# Patient Record
Sex: MALE | Race: BLACK OR AFRICAN AMERICAN | NOT HISPANIC OR LATINO | Employment: OTHER | URBAN - METROPOLITAN AREA
[De-identification: names, ages, dates, MRNs, and addresses within clinical notes are randomized per-mention and may not be internally consistent; named-entity substitution may affect disease eponyms.]

---

## 2022-04-07 ENCOUNTER — HISTORICAL (OUTPATIENT)
Dept: ADMINISTRATIVE | Facility: HOSPITAL | Age: 27
End: 2022-04-07

## 2022-04-18 ENCOUNTER — HISTORICAL (OUTPATIENT)
Dept: ADMINISTRATIVE | Facility: HOSPITAL | Age: 27
End: 2022-04-18
Payer: MEDICAID

## 2022-04-24 VITALS
HEIGHT: 67 IN | BODY MASS INDEX: 23.53 KG/M2 | SYSTOLIC BLOOD PRESSURE: 130 MMHG | OXYGEN SATURATION: 100 % | DIASTOLIC BLOOD PRESSURE: 86 MMHG | WEIGHT: 149.94 LBS

## 2022-05-08 ENCOUNTER — HOSPITAL ENCOUNTER (EMERGENCY)
Facility: HOSPITAL | Age: 27
Discharge: HOME | End: 2022-05-08
Attending: INTERNAL MEDICINE
Payer: MEDICAID

## 2022-05-08 VITALS
HEIGHT: 67 IN | HEART RATE: 81 BPM | DIASTOLIC BLOOD PRESSURE: 86 MMHG | SYSTOLIC BLOOD PRESSURE: 130 MMHG | OXYGEN SATURATION: 98 % | TEMPERATURE: 98 F | BODY MASS INDEX: 21.19 KG/M2 | WEIGHT: 135 LBS | RESPIRATION RATE: 18 BRPM

## 2022-05-08 DIAGNOSIS — R36.9 PENILE DISCHARGE: Primary | ICD-10-CM

## 2022-05-08 DIAGNOSIS — Z71.1 CONCERN ABOUT STD IN MALE WITHOUT DIAGNOSIS: ICD-10-CM

## 2022-05-08 LAB
APPEARANCE UR: ABNORMAL
BACTERIA #/AREA URNS AUTO: ABNORMAL /HPF
BILIRUB UR QL STRIP.AUTO: NEGATIVE MG/DL
COLOR UR AUTO: ABNORMAL
GLUCOSE UR QL STRIP.AUTO: NEGATIVE MG/DL
KETONES UR QL STRIP.AUTO: NEGATIVE MG/DL
LEUKOCYTE ESTERASE UR QL STRIP.AUTO: ABNORMAL UNIT/L
NITRITE UR QL STRIP.AUTO: NEGATIVE
PH UR STRIP.AUTO: 6 [PH]
PROT UR QL STRIP.AUTO: NEGATIVE MG/DL
RBC #/AREA URNS AUTO: ABNORMAL /HPF
RBC UR QL AUTO: NEGATIVE UNIT/L
SP GR UR STRIP.AUTO: 1.02
SQUAMOUS #/AREA URNS AUTO: ABNORMAL /LPF
UROBILINOGEN UR STRIP-ACNC: 0.2 MG/DL
WBC #/AREA URNS AUTO: ABNORMAL /HPF

## 2022-05-08 PROCEDURE — 87591 N.GONORRHOEAE DNA AMP PROB: CPT | Performed by: NURSE PRACTITIONER

## 2022-05-08 PROCEDURE — 81003 URINALYSIS AUTO W/O SCOPE: CPT | Performed by: NURSE PRACTITIONER

## 2022-05-08 PROCEDURE — 87088 URINE BACTERIA CULTURE: CPT | Performed by: NURSE PRACTITIONER

## 2022-05-08 PROCEDURE — 63600175 PHARM REV CODE 636 W HCPCS: Performed by: NURSE PRACTITIONER

## 2022-05-08 PROCEDURE — 87491 CHLMYD TRACH DNA AMP PROBE: CPT | Performed by: NURSE PRACTITIONER

## 2022-05-08 PROCEDURE — 96372 THER/PROPH/DIAG INJ SC/IM: CPT | Performed by: NURSE PRACTITIONER

## 2022-05-08 PROCEDURE — 81015 MICROSCOPIC EXAM OF URINE: CPT | Performed by: NURSE PRACTITIONER

## 2022-05-08 PROCEDURE — 99284 EMERGENCY DEPT VISIT MOD MDM: CPT

## 2022-05-08 RX ORDER — CEFTRIAXONE 1 G/1
0.5 INJECTION, POWDER, FOR SOLUTION INTRAMUSCULAR; INTRAVENOUS
Status: COMPLETED | OUTPATIENT
Start: 2022-05-08 | End: 2022-05-08

## 2022-05-08 RX ORDER — DOXYCYCLINE 100 MG/1
100 CAPSULE ORAL EVERY 12 HOURS
Qty: 20 CAPSULE | Refills: 0 | OUTPATIENT
Start: 2022-05-08 | End: 2023-07-25

## 2022-05-08 RX ADMIN — CEFTRIAXONE 0.5 G: 1 INJECTION, POWDER, FOR SOLUTION INTRAMUSCULAR; INTRAVENOUS at 09:05

## 2022-05-08 NOTE — Clinical Note
"Jun Vasquez"Reji was seen and treated in our emergency department on 5/8/2022.  He may return to work on 05/09/2022.       If you have any questions or concerns, please don't hesitate to call.      MONY Stafford"

## 2022-05-09 LAB
C TRACH DNA SPEC QL NAA+PROBE: NOT DETECTED
N GONORRHOEA DNA SPEC QL NAA+PROBE: DETECTED

## 2022-05-09 NOTE — ED PROVIDER NOTES
Encounter Date: 5/8/2022       History     Chief Complaint   Patient presents with    Penile Discharge     Patient states penile discharge and dysuria x 2-3 days. Denies any fever, abdominal pain, testicular pain or swelling, or any rashes or lesions. States unprotected intercourse.         Review of patient's allergies indicates:   Allergen Reactions    Penicillins      No past medical history on file.  No past surgical history on file.  No family history on file.     Review of Systems   Constitutional: Negative.  Negative for chills and fever.   HENT: Negative.    Eyes: Negative.    Respiratory: Negative.    Cardiovascular: Negative.    Gastrointestinal: Negative.  Negative for abdominal pain.   Endocrine: Negative.    Genitourinary: Positive for dysuria and penile discharge. Negative for testicular pain.   Musculoskeletal: Negative.    Skin: Negative.    Allergic/Immunologic: Negative.    Neurological: Negative.    Hematological: Negative.    Psychiatric/Behavioral: Negative.    All other systems reviewed and are negative.      Physical Exam     Initial Vitals [05/08/22 2006]   BP Pulse Resp Temp SpO2   130/86 81 18 98.3 °F (36.8 °C) 98 %      MAP       --         Physical Exam    Nursing note and vitals reviewed.  Constitutional: He appears well-developed and well-nourished. No distress.   HENT:   Head: Normocephalic and atraumatic.   Mouth/Throat: Oropharynx is clear and moist.   Eyes: Conjunctivae and EOM are normal. Pupils are equal, round, and reactive to light.   Neck: Neck supple.   Normal range of motion.  Cardiovascular: Normal rate, regular rhythm and normal heart sounds. Exam reveals no gallop and no friction rub.    No murmur heard.  Pulmonary/Chest: Breath sounds normal. No respiratory distress. He has no wheezes. He has no rhonchi. He has no rales.   Abdominal: Abdomen is soft. Bowel sounds are normal. He exhibits no distension and no mass. There is no abdominal tenderness. There is no rebound and  no guarding.   Musculoskeletal:         General: No edema. Normal range of motion.      Cervical back: Normal range of motion and neck supple.     Neurological: He is alert and oriented to person, place, and time. He has normal strength.   Skin: Skin is warm and dry. No rash noted.   Psychiatric: He has a normal mood and affect. Thought content normal.         ED Course   Procedures  Labs Reviewed   CHLAMYDIA/GONORRHOEAE(GC), PCR   URINALYSIS          Imaging Results    None          Medications   cefTRIAXone injection 0.5 g (has no administration in time range)     Medical Decision Making:   Initial Assessment:   Patient is awake, alert, and nontoxic appearing. Patient states penile discharge and dysuria.   Differential Diagnosis:   STD, Gonorrhea, Chlamydia   Clinical Tests:   Lab Tests: Ordered and Reviewed                      Clinical Impression:   Final diagnoses:  [R36.9] Penile discharge (Primary)  [Z71.1] Concern about STD in male without diagnosis          ED Disposition Condition    Discharge Stable        ED Prescriptions     Medication Sig Dispense Start Date End Date Auth. Provider    doxycycline (VIBRAMYCIN) 100 MG Cap Take 1 capsule (100 mg total) by mouth every 12 (twelve) hours. for 10 days 20 capsule 5/8/2022 5/18/2022 MONY Stafford        Follow-up Information     Follow up With Specialties Details Why Contact Info    Primary Care Provider  In 2 days             MONY Stafford  05/08/22 8488

## 2022-05-11 LAB — BACTERIA UR CULT: NO GROWTH

## 2022-09-11 ENCOUNTER — HOSPITAL ENCOUNTER (EMERGENCY)
Facility: HOSPITAL | Age: 27
Discharge: HOME | End: 2022-09-12
Attending: STUDENT IN AN ORGANIZED HEALTH CARE EDUCATION/TRAINING PROGRAM
Payer: MEDICAID

## 2022-09-11 VITALS
RESPIRATION RATE: 20 BRPM | OXYGEN SATURATION: 98 % | TEMPERATURE: 99 F | SYSTOLIC BLOOD PRESSURE: 122 MMHG | HEART RATE: 85 BPM | DIASTOLIC BLOOD PRESSURE: 62 MMHG | WEIGHT: 142 LBS | BODY MASS INDEX: 22.29 KG/M2 | HEIGHT: 67 IN

## 2022-09-11 DIAGNOSIS — K62.5 RECTAL BLEEDING: ICD-10-CM

## 2022-09-11 DIAGNOSIS — R10.9 RIGHT FLANK PAIN: ICD-10-CM

## 2022-09-11 DIAGNOSIS — L84 CALLUS OF FOOT: Primary | ICD-10-CM

## 2022-09-11 LAB
APPEARANCE UR: CLEAR
APTT PPP: 26 SECONDS (ref 23.4–33.9)
BASOPHILS # BLD AUTO: 0.03 X10(3)/MCL (ref 0–0.2)
BASOPHILS NFR BLD AUTO: 0.5 %
BILIRUB UR QL STRIP.AUTO: NEGATIVE MG/DL
COLOR UR AUTO: YELLOW
EOSINOPHIL # BLD AUTO: 0.13 X10(3)/MCL (ref 0–0.9)
EOSINOPHIL NFR BLD AUTO: 2.2 %
ERYTHROCYTE [DISTWIDTH] IN BLOOD BY AUTOMATED COUNT: 11.9 % (ref 11.5–17)
GLUCOSE UR QL STRIP.AUTO: NEGATIVE MG/DL
HCT VFR BLD AUTO: 46 % (ref 42–52)
HGB BLD-MCNC: 15 GM/DL (ref 14–18)
IMM GRANULOCYTES # BLD AUTO: 0.01 X10(3)/MCL (ref 0–0.04)
IMM GRANULOCYTES NFR BLD AUTO: 0.2 %
INR BLD: 1.17 (ref 2–3)
KETONES UR QL STRIP.AUTO: ABNORMAL MG/DL
LEUKOCYTE ESTERASE UR QL STRIP.AUTO: NEGATIVE UNIT/L
LYMPHOCYTES # BLD AUTO: 2.31 X10(3)/MCL (ref 0.6–4.6)
LYMPHOCYTES NFR BLD AUTO: 39.4 %
MCH RBC QN AUTO: 28.7 PG (ref 27–31)
MCHC RBC AUTO-ENTMCNC: 32.6 MG/DL (ref 33–36)
MCV RBC AUTO: 88.1 FL (ref 80–94)
MONOCYTES # BLD AUTO: 0.53 X10(3)/MCL (ref 0.1–1.3)
MONOCYTES NFR BLD AUTO: 9 %
NEUTROPHILS # BLD AUTO: 2.9 X10(3)/MCL (ref 2.1–9.2)
NEUTROPHILS NFR BLD AUTO: 48.7 %
NITRITE UR QL STRIP.AUTO: NEGATIVE
NRBC BLD AUTO-RTO: 0 %
PH UR STRIP.AUTO: 6 [PH]
PLATELET # BLD AUTO: 314 X10(3)/MCL (ref 130–400)
PMV BLD AUTO: 9.2 FL (ref 7.4–10.4)
PROT UR QL STRIP.AUTO: NEGATIVE MG/DL
PROTHROMBIN TIME: 14.7 SECONDS (ref 11.7–14.5)
RBC # BLD AUTO: 5.22 X10(6)/MCL (ref 4.7–6.1)
RBC UR QL AUTO: NEGATIVE UNIT/L
SP GR UR STRIP.AUTO: >=1.03
UROBILINOGEN UR STRIP-ACNC: 0.2 MG/DL
WBC # SPEC AUTO: 5.9 X10(3)/MCL (ref 4.5–11.5)

## 2022-09-11 PROCEDURE — 85610 PROTHROMBIN TIME: CPT | Performed by: STUDENT IN AN ORGANIZED HEALTH CARE EDUCATION/TRAINING PROGRAM

## 2022-09-11 PROCEDURE — 99283 EMERGENCY DEPT VISIT LOW MDM: CPT

## 2022-09-11 PROCEDURE — 81003 URINALYSIS AUTO W/O SCOPE: CPT | Performed by: STUDENT IN AN ORGANIZED HEALTH CARE EDUCATION/TRAINING PROGRAM

## 2022-09-11 PROCEDURE — 85730 THROMBOPLASTIN TIME PARTIAL: CPT | Performed by: STUDENT IN AN ORGANIZED HEALTH CARE EDUCATION/TRAINING PROGRAM

## 2022-09-11 PROCEDURE — 80053 COMPREHEN METABOLIC PANEL: CPT | Performed by: STUDENT IN AN ORGANIZED HEALTH CARE EDUCATION/TRAINING PROGRAM

## 2022-09-11 PROCEDURE — 85025 COMPLETE CBC W/AUTO DIFF WBC: CPT | Performed by: STUDENT IN AN ORGANIZED HEALTH CARE EDUCATION/TRAINING PROGRAM

## 2022-09-11 PROCEDURE — 36415 COLL VENOUS BLD VENIPUNCTURE: CPT | Performed by: STUDENT IN AN ORGANIZED HEALTH CARE EDUCATION/TRAINING PROGRAM

## 2022-09-12 LAB
ALBUMIN SERPL-MCNC: 4.5 GM/DL (ref 3.5–5)
ALBUMIN/GLOB SERPL: 1.3 RATIO (ref 1.1–2)
ALP SERPL-CCNC: 52 UNIT/L (ref 40–150)
ALT SERPL-CCNC: 12 UNIT/L (ref 0–55)
AST SERPL-CCNC: 17 UNIT/L (ref 5–34)
BILIRUBIN DIRECT+TOT PNL SERPL-MCNC: 0.4 MG/DL
BUN SERPL-MCNC: 16.7 MG/DL (ref 8.9–20.6)
CALCIUM SERPL-MCNC: 9.9 MG/DL (ref 8.4–10.2)
CHLORIDE SERPL-SCNC: 102 MMOL/L (ref 98–107)
CO2 SERPL-SCNC: 29 MMOL/L (ref 22–29)
CREAT SERPL-MCNC: 1.1 MG/DL (ref 0.73–1.18)
GFR SERPLBLD CREATININE-BSD FMLA CKD-EPI: >60 MLS/MIN/1.73/M2
GLOBULIN SER-MCNC: 3.5 GM/DL (ref 2.4–3.5)
GLUCOSE SERPL-MCNC: 88 MG/DL (ref 74–100)
POTASSIUM SERPL-SCNC: 4.1 MMOL/L (ref 3.5–5.1)
PROT SERPL-MCNC: 8 GM/DL (ref 6.4–8.3)
SODIUM SERPL-SCNC: 142 MMOL/L (ref 136–145)

## 2022-09-12 NOTE — ED TRIAGE NOTES
Pt to er with blood stool x 2 months. Right flank pain x months. Pt states that he also has left foot/neck pain

## 2022-09-12 NOTE — ED PROVIDER NOTES
Encounter Date: 9/11/2022       History     Chief Complaint   Patient presents with    Rectal Bleeding    Flank Pain     HPI    26-year-old male presents emergency department with multiple complaints.  First is complaining of some left toe pain.  States he noticed a callus on his toe.  States has been ongoing for last few months.  No new injury.  No worsening of pain.  Unsure why the toe was hurting.  States that his work boots are too big for him.    Secondly patient is complaining of some right-sided flank pain.  States he has been ongoing for last 2-3 months.  States the pain fluctuates in intensity.  States it slightly worsened today so came in for evaluation.  He denies any burning on urination, blood in his urine or decreased urination.    Lastly, patient stating he has some blood mixed into his stool has been ongoing intermittently for last 2 months.  States that the blood is on the outside of the stool and when he wipes there is blood in the toilet paper.  Denies having any abdominal pain other than the flank pain as mentioned above.  No history is of hemorrhoids.  No weight loss.  Denies constipation or hard stools    Review of patient's allergies indicates:   Allergen Reactions    Penicillins      History reviewed. No pertinent past medical history.  History reviewed. No pertinent surgical history.  History reviewed. No pertinent family history.  Social History     Tobacco Use    Smoking status: Never    Smokeless tobacco: Never   Substance Use Topics    Alcohol use: Yes     Comment: socially     Review of Systems   Constitutional:  Negative for fever.   Respiratory:  Negative for cough and shortness of breath.    Cardiovascular:  Positive for chest pain.   Gastrointestinal:  Positive for anal bleeding and blood in stool. Negative for abdominal pain, constipation, diarrhea, nausea, rectal pain and vomiting.   Genitourinary:  Positive for flank pain. Negative for decreased urine volume, difficulty  "urinating, dysuria, hematuria, penile discharge, penile pain, scrotal swelling, testicular pain and urgency.   Musculoskeletal:         Toe pain   Skin:  Negative for rash and wound.     Physical Exam     Initial Vitals [09/11/22 2216]   BP Pulse Resp Temp SpO2   122/62 85 20 98.5 °F (36.9 °C) 98 %      MAP       --         Physical Exam    Nursing note and vitals reviewed.  Constitutional: He appears well-developed and well-nourished. No distress.   Cardiovascular:  Normal rate and regular rhythm.           Pulmonary/Chest: Breath sounds normal.   Abdominal: Abdomen is soft. Bowel sounds are normal. He exhibits no distension. There is abdominal tenderness (generalized tenderness to the right flank which seems to be exaggerated).   There is right CVA tenderness.  No left CVA tenderness. There is no rebound and no guarding.   Genitourinary:    Rectum normal.   Rectum:      No anal fissure or external hemorrhoid.      Genitourinary Comments: Patient refused internal rectal exam.  He understands that this significantly limits my diagnostic abilities.  Patient states that he understands but "it's not that bad anyway".     Musculoskeletal:         General: No tenderness. Normal range of motion.     Neurological: He is alert and oriented to person, place, and time. GCS score is 15. GCS eye subscore is 4. GCS verbal subscore is 5. GCS motor subscore is 6.   Skin: Skin is warm and dry. Capillary refill takes less than 2 seconds.   Callus noted to left great toe.     Psychiatric: He has a normal mood and affect. Thought content normal.       ED Course   Procedures  Labs Reviewed   URINALYSIS, REFLEX TO URINE CULTURE - Abnormal; Notable for the following components:       Result Value    Ketones, UA Trace (*)     All other components within normal limits   PROTIME-INR - Abnormal; Notable for the following components:    PT 14.7 (*)     INR 1.17 (*)     All other components within normal limits   CBC WITH DIFFERENTIAL - " Abnormal; Notable for the following components:    MCHC 32.6 (*)     All other components within normal limits   APTT - Normal   COMPREHENSIVE METABOLIC PANEL   CBC W/ AUTO DIFFERENTIAL    Narrative:     The following orders were created for panel order CBC auto differential.  Procedure                               Abnormality         Status                     ---------                               -----------         ------                     CBC with Differential[[id number]]        Abnormal            Final result                 Please view results for these tests on the individual orders.   OCCULT BLOOD,STOOL,SCREEN (1-3)    Narrative:     The following orders were created for panel order Occult Blood, Stool Screening (1 -3).  Procedure                               Abnormality         Status                     ---------                               -----------         ------                     Occult Blood Stool, CA S...[[id number]]                                                 OCCULT BLOOD STOOL, CA S...[[id number]]                                                   Please view results for these tests on the individual orders.   OCCULT BLOOD STOOL, CA SCREEN   OCCULT BLOOD STOOL, CA SCREEN 2ND SPEC          Imaging Results    None          Medications - No data to display  Medical Decision Making:   Differential Diagnosis:   Toe callus, flank muscle strain, kidney stone, UTI, pyelonephritis, anal fissure, hemorrhoid, lower GI bleed  ED Management:  As stated in physical exam patient does not want to have a rectal exam completed.  He understands that this will limit the ability of complete evaluation.  States that the bleeding is not bad any way and just wants to have the flank in the toe looked at.           ED Course as of 09/12/22 0004   Mon Sep 12, 2022   0002 Patient resting in bed no acute distress.  Vital signs stable.  Labs although normal limits.  No blood in his urine.  States he is no longer  having any flank pain.  No indication for scanning. [BS]      ED Course User Index  [BS] Pasquale Justice MD             Clinical Impression:   Final diagnoses:  [L84] Callus of foot (Primary)  [R10.9] Right flank pain  [K62.5] Rectal bleeding      ED Disposition Condition    Discharge Stable          ED Prescriptions    None       Follow-up Information       Follow up With Specialties Details Why Contact Info    [facility] - Emergency Dept Emergency Medicine   [id number]  [facility] [id number] [phone]             Pasquale Justice MD  09/12/22 0004

## 2022-11-13 ENCOUNTER — HOSPITAL ENCOUNTER (EMERGENCY)
Facility: HOSPITAL | Age: 27
Discharge: HOME | End: 2022-11-13
Attending: INTERNAL MEDICINE
Payer: MEDICAID

## 2022-11-13 VITALS
HEART RATE: 68 BPM | OXYGEN SATURATION: 99 % | BODY MASS INDEX: 22.76 KG/M2 | HEIGHT: 67 IN | WEIGHT: 145 LBS | TEMPERATURE: 98 F | RESPIRATION RATE: 18 BRPM | SYSTOLIC BLOOD PRESSURE: 127 MMHG | DIASTOLIC BLOOD PRESSURE: 93 MMHG

## 2022-11-13 DIAGNOSIS — Z71.1 CONCERN ABOUT STD IN MALE WITHOUT DIAGNOSIS: Primary | ICD-10-CM

## 2022-11-13 LAB
APPEARANCE UR: CLEAR
BILIRUB UR QL STRIP.AUTO: NEGATIVE MG/DL
C TRACH DNA SPEC QL NAA+PROBE: NOT DETECTED
COLOR UR AUTO: YELLOW
GLUCOSE UR QL STRIP.AUTO: NEGATIVE MG/DL
KETONES UR QL STRIP.AUTO: NEGATIVE MG/DL
LEUKOCYTE ESTERASE UR QL STRIP.AUTO: NEGATIVE UNIT/L
N GONORRHOEA DNA SPEC QL NAA+PROBE: NOT DETECTED
NITRITE UR QL STRIP.AUTO: NEGATIVE
PH UR STRIP.AUTO: 6 [PH]
PROT UR QL STRIP.AUTO: NEGATIVE MG/DL
RBC UR QL AUTO: NEGATIVE UNIT/L
SP GR UR STRIP.AUTO: >=1.03
UROBILINOGEN UR STRIP-ACNC: 0.2 MG/DL

## 2022-11-13 PROCEDURE — 87491 CHLMYD TRACH DNA AMP PROBE: CPT | Performed by: PHYSICIAN ASSISTANT

## 2022-11-13 PROCEDURE — 99282 EMERGENCY DEPT VISIT SF MDM: CPT | Mod: 25

## 2022-11-13 PROCEDURE — 81003 URINALYSIS AUTO W/O SCOPE: CPT | Performed by: PHYSICIAN ASSISTANT

## 2022-11-13 PROCEDURE — 87591 N.GONORRHOEAE DNA AMP PROB: CPT | Performed by: PHYSICIAN ASSISTANT

## 2022-11-13 NOTE — ED PROVIDER NOTES
Encounter Date: 11/13/2022       History     Chief Complaint   Patient presents with    Exposure to STD     Pt states wants to be checked, denies any issues with urination or discharge     26-year-old male presents to ED for evaluation of STD check.  States that he is concerned that he may have an STD.  Denies any trouble with urination.  Denies any dysuria.  Denies any penile pain or discharge.  States that he just would like a full check.    The history is provided by the patient. No  was used.   Review of patient's allergies indicates:   Allergen Reactions    Penicillins      History reviewed. No pertinent past medical history.  Past Surgical History:   Procedure Laterality Date    axillary gland       No family history on file.  Social History     Tobacco Use    Smoking status: Never    Smokeless tobacco: Never   Substance Use Topics    Alcohol use: Yes     Comment: socially     Review of Systems   Constitutional:  Negative for chills, fatigue and fever.   HENT:  Negative for sore throat.    Respiratory:  Negative for shortness of breath.    Cardiovascular:  Negative for chest pain.   Gastrointestinal:  Negative for nausea and vomiting.   Genitourinary:  Negative for dysuria, genital sores, hematuria, penile discharge, penile pain, penile swelling, scrotal swelling, testicular pain and urgency.   Musculoskeletal:  Negative for back pain.   Skin:  Negative for rash.   All other systems reviewed and are negative.    Physical Exam     Initial Vitals [11/13/22 1504]   BP Pulse Resp Temp SpO2   (!) 127/93 68 18 98.1 °F (36.7 °C) 99 %      MAP       --         Physical Exam    Nursing note and vitals reviewed.  Constitutional: He appears well-developed. He is cooperative.   HENT:   Head: Normocephalic and atraumatic.   Right Ear: External ear normal.   Left Ear: External ear normal.   Eyes: Conjunctivae are normal. Pupils are equal, round, and reactive to light.   Neck: Neck supple.   Normal range  of motion.  Cardiovascular:  Normal rate, regular rhythm and normal heart sounds.           Pulmonary/Chest: Breath sounds normal. No respiratory distress. He has no wheezes. He has no rhonchi. He has no rales.   Abdominal: Abdomen is soft. Bowel sounds are normal. There is no abdominal tenderness.   Genitourinary:    Genitourinary Comments: Exam deferred by patient     Musculoskeletal:         General: Normal range of motion.      Cervical back: Normal range of motion and neck supple.     Neurological: He is alert and oriented to person, place, and time.   Skin: Skin is warm and dry. Capillary refill takes less than 2 seconds.   Psychiatric: He has a normal mood and affect.       ED Course   Procedures  Labs Reviewed   CHLAMYDIA/GONORRHOEAE(GC), PCR   URINALYSIS, REFLEX TO URINE CULTURE          Imaging Results    None          Medications - No data to display  Medical Decision Making:   Differential Diagnosis:   Sexually transmitted disease, UTI                        Clinical Impression:   Final diagnoses:  [Z71.1] Concern about STD in male without diagnosis (Primary)      ED Disposition Condition    Discharge Stable          ED Prescriptions    None       Follow-up Information       Follow up With Specialties Details Why Contact Info    PCP  In 1 week As needed     [facility]  Go to   [facility] [place] [address]  Phone #[phone]             ESTEFANÍA Naranjo  11/13/22 3117

## 2022-11-17 ENCOUNTER — HOSPITAL ENCOUNTER (EMERGENCY)
Facility: HOSPITAL | Age: 27
Discharge: HOME | End: 2022-11-17
Attending: INTERNAL MEDICINE
Payer: MEDICAID

## 2022-11-17 VITALS
HEART RATE: 68 BPM | RESPIRATION RATE: 16 BRPM | OXYGEN SATURATION: 98 % | TEMPERATURE: 98 F | DIASTOLIC BLOOD PRESSURE: 68 MMHG | SYSTOLIC BLOOD PRESSURE: 122 MMHG

## 2022-11-17 DIAGNOSIS — J06.9 VIRAL URI WITH COUGH: Primary | ICD-10-CM

## 2022-11-17 LAB
FLUAV AG UPPER RESP QL IA.RAPID: NOT DETECTED
FLUBV AG UPPER RESP QL IA.RAPID: NOT DETECTED
SARS-COV-2 RNA RESP QL NAA+PROBE: NOT DETECTED
STREP A PCR (OHS): NOT DETECTED

## 2022-11-17 PROCEDURE — 99284 EMERGENCY DEPT VISIT MOD MDM: CPT | Mod: 25

## 2022-11-17 PROCEDURE — 87651 STREP A DNA AMP PROBE: CPT | Performed by: INTERNAL MEDICINE

## 2022-11-17 PROCEDURE — 0240U COVID/FLU A&B PCR: CPT | Performed by: INTERNAL MEDICINE

## 2022-11-17 RX ORDER — PREDNISONE 20 MG/1
20 TABLET ORAL DAILY
Qty: 5 TABLET | Refills: 0 | Status: SHIPPED | OUTPATIENT
Start: 2022-11-17 | End: 2023-09-10 | Stop reason: ALTCHOICE

## 2022-11-17 RX ORDER — GUAIFENESIN/DEXTROMETHORPHAN 100-10MG/5
5 SYRUP ORAL EVERY 6 HOURS PRN
Qty: 180 ML | Refills: 0 | Status: SHIPPED | OUTPATIENT
Start: 2022-11-17 | End: 2023-09-10 | Stop reason: ALTCHOICE

## 2022-11-17 NOTE — Clinical Note
"Jun Vasquez"Reji was seen and treated in our emergency department on 11/17/2022.  He may return to work on 11/18/2022.       If you have any questions or concerns, please don't hesitate to call.      Micah Jacobsen, DO"

## 2022-11-17 NOTE — Clinical Note
"Jun Vasquez"Reji was seen and treated in our emergency department on 11/17/2022.  He may return to work on 11/19/2022.       If you have any questions or concerns, please don't hesitate to call.      Micah Jacobsen, DO"

## 2022-11-18 NOTE — ED PROVIDER NOTES
Source of History:  Patient, no limitations    Chief complaint:  Chills and Cough      HPI:  Jun Mendoza is a 26 y.o. male presenting with Chills and Cough         Patient presents for evaluation of congestion, sore throat. Onset of symptoms was abrupt starting 1 day ago, and has been unchanged since that time. Symptoms include voice changes. The symptoms are worse with change in weather and cold symptoms. The patient denies complaints of fever. The patient has a past medical history of No pertinent additional PMH. Fluid intake has been good. Care prior to arrival consisted of  None, with no relief.        Review of Systems   Constitutional symptoms:  Negative except as documented in HPI.   Skin symptoms:  Negative except as documented in HPI.   HEENT symptoms:  Negative except as documented in HPI.   Respiratory symptoms:  Negative except as documented in HPI.   Cardiovascular symptoms:  Negative except as documented in HPI.   Gastrointestinal symptoms:  Negative except as documented in HPI.    Genitourinary symptoms:  Negative except as documented in HPI.   Musculoskeletal symptoms:  Negative except as documented in HPI.   Neurologic symptoms:  Negative except as documented in HPI.   Psychiatric symptoms:  Negative except as documented in HPI.   Allergy/immunologic symptoms:  Negative except as documented in HPI.             Additional review of systems information: All other systems reviewed and otherwise negative.      Review of patient's allergies indicates:   Allergen Reactions    Penicillins Other (See Comments)       PMH:  As per HPI and below:    History reviewed. No pertinent past medical history.     History reviewed. No pertinent family history.    Past Surgical History:   Procedure Laterality Date    axillary gland         Social History     Tobacco Use    Smoking status: Never    Smokeless tobacco: Never   Substance Use Topics    Alcohol use: Not Currently     Comment: socially       There is no  problem list on file for this patient.       Physical Exam:    /68 (Patient Position: Sitting)   Pulse 68   Temp 97.7 °F (36.5 °C) (Oral)   Resp 16   SpO2 98%     Nursing note and vital signs reviewed.    General:  Alert, no acute distress.   Skin: Normal for Ethnic Origin, No cyanosis  HEENT: Normocephalic and atraumatic, Vision unchanged, Pupils symmetric, No icterus , Nasal mucosa is pink and moist, congestion  Cardiovascular:  Regular rate and rhythm, No edema  Chest Wall: No deformity, equal chest rise  Respiratory:  Lungs are clear to auscultation, respirations are non-labored.    Musculoskeletal:  No deformity, Normal perfusion to all extremities  Back: No bony tenderness  : No suprapubic pain, No CVA tenderness  Gastrointestinal:  Soft, Nontender, Non distended, Normal bowel sounds.    Neurological:  Alert and oriented to person, place, time, and situation, normal motor observed, normal speech observed.    Psychiatric:  Cooperative, appropriate mood & affect.        Labs that have been ordered have been independently reviewed and interpreted by myself.     Old Chart Reviewed.      Initial Impression/ Differential Dx:  Viral upper respiratory infection, sinusitis, allergic rhinitis, bronchitis, influenza, pneumonia, dental infection, pharyngitis       MDM:      Reviewed Nurses Note.    Reviewed Pertinent old records.    Orders Placed This Encounter    Strep Group A by PCR    COVID/FLU A&B PCR    predniSONE (DELTASONE) 20 MG tablet    dextromethorphan-guaiFENesin  mg/5 ml (ROBITUSSIN-DM)  mg/5 mL liquid                    Labs Reviewed   STREP GROUP A BY PCR - Normal    Narrative:     The Xpert Xpress Strep A test is a rapid, qualitative in vitro diagnostic test for the detection of Streptococcus pyogenes (Group A ß-hemolytic Streptococcus, Strep A) in throat swab specimens from patients with signs and symptoms of pharyngitis.     COVID/FLU A&B PCR - Normal    Narrative:     The Xpert  Xpress SARS-CoV-2/FLU/RSV plus is a rapid, multiplexed real-time PCR test intended for the simultaneous qualitative detection and differentiation of SARS-CoV-2, Influenza A, Influenza B, and respiratory syncytial virus (RSV) viral RNA in either nasopharyngeal swab or nasal swab specimens.                No orders to display        Admission on 11/17/2022, Discharged on 11/17/2022   Component Date Value Ref Range Status    STREP A PCR ([facility]) 11/17/2022 Not Detected  Not Detected Final    Influenza A PCR 11/17/2022 Not Detected  Not Detected Final    Influenza B PCR 11/17/2022 Not Detected  Not Detected Final    SARS-CoV-2 PCR 11/17/2022 Not Detected  Not Detected Final       Imaging Results    None                                              Diagnostic Impression:    1. Viral URI with cough         ED Disposition Condition    Discharge Stable             Follow-up Information       [facility] - Emergency Dept.    Specialty: Emergency Medicine  Why: If symptoms worsen  Contact information:  [address] [facility] [id number] [phone]                            ED Prescriptions       Medication Sig Dispense Start Date End Date Auth. Provider    predniSONE (DELTASONE) 20 MG tablet Take 1 tablet (20 mg total) by mouth once daily. 5 tablet 11/17/2022 -- Micah Jacobsen,     dextromethorphan-guaiFENesin  mg/5 ml (ROBITUSSIN-DM)  mg/5 mL liquid Take 5 mLs by mouth every 6 (six) hours as needed (cough). 180 mL 11/17/2022 -- Micah Jacobsen DO          Follow-up Information       Follow up With Specialties Details Why Contact Info    [facility] - Emergency Dept Emergency Medicine  If symptoms worsen [id number]  [facility] [phone]             Micah Jacobsen DO  11/18/22 0052

## 2022-11-23 ENCOUNTER — PATIENT OUTREACH (OUTPATIENT)
Dept: EMERGENCY MEDICINE | Facility: HOSPITAL | Age: 27
End: 2022-11-23
Payer: MEDICAID

## 2022-11-23 NOTE — PATIENT INSTRUCTIONS
Why Should I Have My Own Doctor or Nurse Practitioner (PCP) to Take Care of Me  What is a PCP (Primary Care Provider)?    A primary care provider is a doctor or nurse practitioner who you can call for an appointment and will see you when you are sick.    You will also be seen at scheduled appointment times during the year to check on your diabetes, or high blood pressure, or heart disease.    Why see the same PCP (doctor/nurse practitioner)?    You can be seen faster when you are sick           You, the PCP (doctor/nurse practitioner) and the office staff get to know each other; you begin to trust them to care for you. You take part in your health choices.   All of you together are a team.    Your medicine is looked at every time you visit, to be sure you are taking the medicine, as the PCP (doctor/nurse practitioner) ordered.    Your PCP (doctor/nurse practitioner) and their staff help keep you healthy and out of the hospital.  They can catch sicknesses earlier by ordering tests once a year to stop or prevent the sickness from getting worse.      Your PCP (doctor/nurse practitioner) can send you to providers who specialize (heart/bone/lung) if you need.  They and their office staff help keep track of your seeing other providers (doctors/nurse practitioners) and tests (CT/ MRIs/ X Rays)) taken.        PCPs want you to stay healthy.  Let us care for you.                   What Do I Do If I Wake Up Sick                                                     If you wake up sick, or you start to fill sick during the day, try these tips to get care and start to feel better soon.                                                                                                                              As soon as you start to feel bad, call your doctor's office and ask for same day or next day visit appointment.        If you cannot be seen with your doctor's office within 24 hours, you can go to an urgent care and be  "seen.          How to stay well:     Take your medicine as ordered by your doctor      Fill your Medicine before you run out      Exercise       Enjoy walking in the sunlight daily  Keep your scheduled clinic appointments      Keep you scheduled yearly wellness visits            Budget-Friendly Healthy Eating    Save money at the grocery store and eat healthy.   Buy groceries keeping your budget in mind  Make a grocery list and only buy what you have on the list  Eat food you cook or have at home; limit fast food or eating out    Compare food labels  Look at store brand food labels and compare to brand names, often food value is the same and the store brand is cheaper      Look for products that do not have sugar, fat, or salt (sodium) added.  These often cost the same but are healthier for you.  They may be labeled as:  ?Sugar-free.  ?Nonfat.              ?Low-fat.  ?Sodium-free.  ?Low sodium.  Look for lean ground beef labeled as at least 92% lean and 8% fat.        Shopping    Buy only the items on your grocery list and go only to the areas of the store that have the items on your list.  Use coupons only for foods and brands you normally buy. Avoid buying items you wouldn't normally buy simply because they are on sale.  Check online and in newspapers for weekly deals.  Buy healthy items from the bulk bins when available, such as herbs, spices, flour, pasta, nuts, and dried fruit.  Buy fruits and vegetables that are in season. Prices are usually lower on in-season produce.  Look at the unit price on the price tag. Use it to compare different brands and sizes to find out which item is the best deal.  Choose healthy items that are often low-cost, such as carrots, potatoes, apples, bananas, and oranges. Dried or canned beans are a low-cost protein source.      Buy in bulk and freeze extra food. Items you can buy in bulk include meats, fish, poultry, frozen fruits, and frozen vegetables.  Avoid buying "ready-to-eat" " "foods, such as pre-cut fruits and vegetables and pre-made salads.  If possible, shop around to discover where you can find the best prices. Consider other retailers such as dollar stores, larger wholesale stores, local fruit and vegetable stands, and farmers markets.  Do not shop when you are hungry. If you shop while hungry, it may be hard to stick to your list and budget.      Resist impulse buying. Use your grocery list as your official plan for the week.      Buy a variety of vegetables and fruits by purchasing fresh, frozen, and canned items.  Look at the top and bottom shelves for deals. Foods at eye level (eye level of an adult or child) are usually more expensive.  Be efficient with your time when shopping. The more time you spend at the store, the more money you are likely to spend.  To save money when choosing more expensive foods like meats and dairy:  ?Choose cheaper cuts of meat, such as bone-in chicken thighs and drumsticks instead of skinless and boneless chicken. When you are ready to prepare the chicken, you can remove the skin yourself to make it healthier.  ?Choose lean meats like chicken or turkey instead of beef.  ?Choose canned seafood, such as tuna, salmon, or sardines.  ?Buy eggs as a low-cost source of protein.  ?Buy dried beans and peas, such as lentils, split peas, or kidney beans instead of meats. Dried beans and peas are a good alternative source of protein.  ?Buy the larger tubs of yogurt instead of individual-sized containers.                        Choose water instead of sodas and other sweetened beverages.  Avoid buying chips, cookies, and other "junk food." These items are usually expensive and not healthy.  Meal planning  Do not eat out or get fast food. Prepare food at home.  Make a grocery list and make sure to bring it with you to the store.   Plan meals and snacks according to a grocery list and budget you create.  Use leftovers in your meal plan for the week.  Look for " "recipes where you can cook once and make enough food for two meals.  Include budget-friendly meals like stews, casseroles, and stir-sahni dishes.  Try some meatless meals or try "no cook" meals like salads.  Make sure that half your plate is filled with fruits or vegetables. Choose from fresh, frozen, or canned fruits and vegetables. If eating canned, remember to rinse them before eating. This will remove any excess salt added for packaging.            Summary  Eating healthy on a budget is possible if you plan your meals according to your budget, buy according to your budget and grocery list, and prepare food yourself.   Tips for buying more food on a limited budget include buying generic brands, using coupons only for foods you normally buy, and buying healthy items from the bulk bins when available.  Tips for buying cheaper food to replace expensive food include choosing cheaper, lean cuts of meat, and buying dried beans and peas.    Discuss any question you have with your doctor.          Why is taking care of your mouth/teeth/gums important?     Your mouth is the opening to your body.  If not kept clean, it can let in sickness to the rest of your body.     Oral Health Care (Dentists)                 City:  Provider Address Phone Number Insurance Plan   [place]:  None available                    Júnior Shi, APOLLO [address] [facility] Júnior ROCHA [phone]   ADULTS ONLY Medicaid:  HB & AETNA ONLY             [place]         [facility] ([facility]) [address] [phone]  DENTURES ONLY ADULT Medicaid:  HB & AETNA ONLY             [facility] [address] [facility] [phone] All Medicaid/Medicare             Sheila Law, CHRISTINES [address]  [facility] Sheila Suero [phone] Medicaid Children only 2 to 21             [place]         [facility] [address] " [phone] Medicare             [facility] [address], [place] [phone] Medicaid Children only 2 to 21             [facility] [address], [place] [phone] Medicaid Children only 2 to 21             [facility] [facility]  [address], [place] [phone] Medicaid Children only 5 yrs and younger:  lip/tongue tie             [facility] [address] [facility] [phone] Medicaid Children only 2 to 21             [facility] [address] [facility] [phone] All Medicaid/Medicare :  ADULTS             [address] [phone] All Medicaid/Medicare :  ADULTS             [facility]  [facility] [phone] Medicaid Children only 2 to 21             [facility]  [address] [place] [phone] Medicaid Children only 2 to 21             Dr. Nancy Dumont,  [facility] [phone] Medicare for Dentures Only             [facility] [place], [facility] [address] [phone] All Medicaid/Medicare :   EXCEPT [facility]; ADULTS             [address] [phone]  UHC, IHC, HB, Aetna/Medicare :  ADULTS     [facility]; [place]: [phone]  [facility]; [place]: [phone]

## 2022-11-23 NOTE — PROGRESS NOTES
Identity verified.  Pt states his symptoms have resolved.  He states he filled and is taking the medications as prescribed.  He denies any questions about the medications or ED discharge instructions.  Pt states he has a follow up visit 11/25/2022 but does not know the provider's name.  Educated on the importance of having a PCP, eating a healthy diet, drinking plenty of water, oral care, when/where to get medical services.  Patient denies any SDOH barriers at this time. Stressed the importance of keeping appointments and instructed on the use of urgent care clinic for non emergent issues when PCP unavailable.  Patient verbalized understanding to all instructions.     Offered to enroll in [facility]IP program, patient declined.  Encounter closed.    Providers Patient Visited Last Year :     PCP  Jun Andujar

## 2023-01-29 ENCOUNTER — HOSPITAL ENCOUNTER (EMERGENCY)
Facility: HOSPITAL | Age: 28
Discharge: HOME | End: 2023-01-29
Attending: EMERGENCY MEDICINE
Payer: MEDICAID

## 2023-01-29 VITALS
DIASTOLIC BLOOD PRESSURE: 98 MMHG | SYSTOLIC BLOOD PRESSURE: 139 MMHG | RESPIRATION RATE: 18 BRPM | BODY MASS INDEX: 20.4 KG/M2 | TEMPERATURE: 97 F | OXYGEN SATURATION: 98 % | HEIGHT: 67 IN | WEIGHT: 130 LBS | HEART RATE: 76 BPM

## 2023-01-29 DIAGNOSIS — R07.9 CHEST PAIN: ICD-10-CM

## 2023-01-29 DIAGNOSIS — T14.8XXA MUSCLE STRAIN: ICD-10-CM

## 2023-01-29 DIAGNOSIS — M79.18 MUSCULOSKELETAL PAIN: Primary | ICD-10-CM

## 2023-01-29 DIAGNOSIS — F41.9 ANXIETY: ICD-10-CM

## 2023-01-29 LAB
ALBUMIN SERPL-MCNC: 4.8 G/DL (ref 3.5–5)
ALBUMIN/GLOB SERPL: 1.3 RATIO (ref 1.1–2)
ALP SERPL-CCNC: 59 UNIT/L (ref 40–150)
ALT SERPL-CCNC: 25 UNIT/L (ref 0–55)
AMPHET UR QL SCN: NEGATIVE
APPEARANCE UR: CLEAR
AST SERPL-CCNC: 29 UNIT/L (ref 5–34)
BACTERIA #/AREA URNS AUTO: ABNORMAL /HPF
BARBITURATE SCN PRESENT UR: NEGATIVE
BASOPHILS # BLD AUTO: 0.02 X10(3)/MCL (ref 0–0.2)
BASOPHILS NFR BLD AUTO: 0.4 %
BENZODIAZ UR QL SCN: NEGATIVE
BILIRUB UR QL STRIP.AUTO: ABNORMAL MG/DL
BILIRUBIN DIRECT+TOT PNL SERPL-MCNC: 1.3 MG/DL
BUN SERPL-MCNC: 9.9 MG/DL (ref 8.9–20.6)
CALCIUM SERPL-MCNC: 9.9 MG/DL (ref 8.4–10.2)
CANNABINOIDS UR QL SCN: NEGATIVE
CHLORIDE SERPL-SCNC: 103 MMOL/L (ref 98–107)
CO2 SERPL-SCNC: 28 MMOL/L (ref 22–29)
COCAINE UR QL SCN: NEGATIVE
COLOR UR AUTO: ABNORMAL
CREAT SERPL-MCNC: 0.94 MG/DL (ref 0.73–1.18)
EOSINOPHIL # BLD AUTO: 0.07 X10(3)/MCL (ref 0–0.9)
EOSINOPHIL NFR BLD AUTO: 1.4 %
ERYTHROCYTE [DISTWIDTH] IN BLOOD BY AUTOMATED COUNT: 11.9 % (ref 11.5–17)
GFR SERPLBLD CREATININE-BSD FMLA CKD-EPI: >60 MLS/MIN/1.73/M2
GLOBULIN SER-MCNC: 3.6 GM/DL (ref 2.4–3.5)
GLUCOSE SERPL-MCNC: 84 MG/DL (ref 74–100)
GLUCOSE UR QL STRIP.AUTO: NEGATIVE MG/DL
HCT VFR BLD AUTO: 45.1 % (ref 42–52)
HGB BLD-MCNC: 14.8 GM/DL (ref 14–18)
IMM GRANULOCYTES # BLD AUTO: 0.01 X10(3)/MCL (ref 0–0.04)
IMM GRANULOCYTES NFR BLD AUTO: 0.2 %
KETONES UR QL STRIP.AUTO: ABNORMAL MG/DL
LEUKOCYTE ESTERASE UR QL STRIP.AUTO: NEGATIVE UNIT/L
LYMPHOCYTES # BLD AUTO: 2.18 X10(3)/MCL (ref 0.6–4.6)
LYMPHOCYTES NFR BLD AUTO: 44.3 %
MCH RBC QN AUTO: 28.8 PG
MCHC RBC AUTO-ENTMCNC: 32.8 MG/DL (ref 33–36)
MCV RBC AUTO: 87.7 FL (ref 80–94)
MDMA UR QL SCN: NEGATIVE
MONOCYTES # BLD AUTO: 0.54 X10(3)/MCL (ref 0.1–1.3)
MONOCYTES NFR BLD AUTO: 11 %
MUCOUS THREADS URNS QL MICRO: ABNORMAL /LPF
NEUTROPHILS # BLD AUTO: 2.1 X10(3)/MCL (ref 2.1–9.2)
NEUTROPHILS NFR BLD AUTO: 42.7 %
NITRITE UR QL STRIP.AUTO: NEGATIVE
NRBC BLD AUTO-RTO: 0 %
OPIATES UR QL SCN: NEGATIVE
PCP UR QL: NEGATIVE
PH UR STRIP.AUTO: 6.5 [PH]
PH UR: 6.5 [PH] (ref 3–11)
PLATELET # BLD AUTO: 283 X10(3)/MCL (ref 130–400)
PMV BLD AUTO: 8.7 FL (ref 7.4–10.4)
POTASSIUM SERPL-SCNC: 3.9 MMOL/L (ref 3.5–5.1)
PROT SERPL-MCNC: 8.4 GM/DL (ref 6.4–8.3)
PROT UR QL STRIP.AUTO: ABNORMAL MG/DL
RBC # BLD AUTO: 5.14 X10(6)/MCL (ref 4.7–6.1)
RBC #/AREA URNS AUTO: ABNORMAL /HPF
RBC UR QL AUTO: NEGATIVE UNIT/L
SODIUM SERPL-SCNC: 143 MMOL/L (ref 136–145)
SP GR UR STRIP.AUTO: 1.02
SQUAMOUS #/AREA URNS AUTO: ABNORMAL /HPF
TROPONIN I SERPL-MCNC: <0.01 NG/ML (ref 0–0.04)
UROBILINOGEN UR STRIP-ACNC: 2 MG/DL
WBC # SPEC AUTO: 4.9 X10(3)/MCL (ref 4.5–11.5)
WBC #/AREA URNS AUTO: ABNORMAL /HPF

## 2023-01-29 PROCEDURE — 81001 URINALYSIS AUTO W/SCOPE: CPT | Mod: 59

## 2023-01-29 PROCEDURE — 93010 EKG 12-LEAD: ICD-10-PCS | Mod: ,,, | Performed by: INTERNAL MEDICINE

## 2023-01-29 PROCEDURE — 80307 DRUG TEST PRSMV CHEM ANLYZR: CPT

## 2023-01-29 PROCEDURE — 99284 EMERGENCY DEPT VISIT MOD MDM: CPT

## 2023-01-29 PROCEDURE — 80053 COMPREHEN METABOLIC PANEL: CPT

## 2023-01-29 PROCEDURE — 84484 ASSAY OF TROPONIN QUANT: CPT

## 2023-01-29 PROCEDURE — 85025 COMPLETE CBC W/AUTO DIFF WBC: CPT

## 2023-01-29 PROCEDURE — 93010 ELECTROCARDIOGRAM REPORT: CPT | Mod: ,,, | Performed by: INTERNAL MEDICINE

## 2023-01-29 PROCEDURE — 93005 ELECTROCARDIOGRAM TRACING: CPT

## 2023-01-29 RX ORDER — KETOROLAC TROMETHAMINE 10 MG/1
10 TABLET, FILM COATED ORAL EVERY 6 HOURS PRN
Qty: 20 TABLET | Refills: 0 | Status: SHIPPED | OUTPATIENT
Start: 2023-01-29 | End: 2023-02-02 | Stop reason: SDUPTHER

## 2023-01-29 RX ORDER — TIZANIDINE 4 MG/1
4 TABLET ORAL EVERY 6 HOURS PRN
Qty: 20 TABLET | Refills: 0 | Status: SHIPPED | OUTPATIENT
Start: 2023-01-29 | End: 2023-02-08

## 2023-01-29 RX ORDER — KETOROLAC TROMETHAMINE 30 MG/ML
60 INJECTION, SOLUTION INTRAMUSCULAR; INTRAVENOUS
Status: DISCONTINUED | OUTPATIENT
Start: 2023-01-29 | End: 2023-01-29

## 2023-01-29 RX ORDER — HYDROXYZINE HYDROCHLORIDE 25 MG/1
25 TABLET, FILM COATED ORAL 3 TIMES DAILY PRN
Qty: 30 TABLET | Refills: 0 | Status: SHIPPED | OUTPATIENT
Start: 2023-01-29 | End: 2023-09-10 | Stop reason: ALTCHOICE

## 2023-01-29 NOTE — ED PROVIDER NOTES
"Encounter Date: 1/29/2023       History     Chief Complaint   Patient presents with    Leg Pain     Pt to er c/o leg pain, side pain and chest pain.     The patient is a 27 y.o. male who presents to the Emergency Department with a chief complaint of chest pain. He also reports burning sensation to his thighs when ambulating x 1 week. Symptoms began 1 week ago and have been intermittent since onset. His pain is currently rated as a 1/10 in severity and described as soreness with no radiation. Associated symptoms include bilateral side pain. Symptoms are aggravated with movement and there are no alleviating factors. The patient denies shortness of breath, nausea, vomiting, diarrhea, fever, or chills. He also denies urinary complaints, cough, cold, or congestion. He reports taking "Lortab" prior to arrival with moderate relief of symptoms. No other reported symptoms at this time. Patient states "I think this is all related to stress". He denies numbness or tingling to extremities, saddle anesthesia or weakness.     The history is provided by the patient. No  was used.   Chest Pain  The current episode started several days ago. Duration of episode(s) is 1 week. Chest pain occurs intermittently. The chest pain is unchanged. The pain is associated with exertion. At its most intense, the chest pain is at 4/10. The chest pain is currently at 0/10. The quality of the pain is described as dull and aching. The pain does not radiate. Chest pain is worsened by certain positions. Pertinent negatives for primary symptoms include no fever, no fatigue, no syncope, no shortness of breath, no cough, no wheezing, no palpitations, no abdominal pain, no nausea, no vomiting, no dizziness and no altered mental status.   Pertinent negatives for associated symptoms include no weakness. He tried narcotics for the symptoms. There are no known risk factors.   Review of patient's allergies indicates:   Allergen Reactions    " Penicillins Other (See Comments)     History reviewed. No pertinent past medical history.  Past Surgical History:   Procedure Laterality Date    axillary gland       History reviewed. No pertinent family history.  Social History     Tobacco Use    Smoking status: Never    Smokeless tobacco: Never   Substance Use Topics    Alcohol use: Not Currently     Comment: socially     Review of Systems   Constitutional:  Negative for fatigue and fever.   HENT:  Negative for congestion, rhinorrhea and sore throat.    Respiratory:  Negative for cough, chest tightness, shortness of breath and wheezing.    Cardiovascular:  Positive for chest pain. Negative for palpitations and syncope.   Gastrointestinal:  Negative for abdominal pain, constipation, diarrhea, nausea and vomiting.   Genitourinary:  Negative for dysuria, frequency, hematuria, penile discharge and urgency.   Musculoskeletal:  Positive for arthralgias and back pain. Negative for joint swelling.   Skin:  Negative for rash.   Neurological:  Negative for dizziness and weakness.   Hematological:  Does not bruise/bleed easily.   Psychiatric/Behavioral:  Negative for behavioral problems.    All other systems reviewed and are negative.    Physical Exam     Initial Vitals [01/29/23 1256]   BP Pulse Resp Temp SpO2   (!) 139/98 76 18 97.3 °F (36.3 °C) 98 %      MAP       --         Physical Exam    Nursing note and vitals reviewed.  Constitutional: Vital signs are normal. He appears well-developed and well-nourished.   HENT:   Head: Normocephalic.   Right Ear: Hearing and tympanic membrane normal.   Left Ear: Hearing and tympanic membrane normal.   Mouth/Throat: Uvula is midline, oropharynx is clear and moist and mucous membranes are normal.   Eyes: EOM are normal. Pupils are equal, round, and reactive to light.   Cardiovascular:  Regular rhythm, normal heart sounds, intact distal pulses and normal pulses.           Pulses:       Dorsalis pedis pulses are 2+ on the right side  and 2+ on the left side.   Pulmonary/Chest: Effort normal and breath sounds normal.   Abdominal: Abdomen is soft. Bowel sounds are normal. There is no abdominal tenderness.   No right CVA tenderness.  No left CVA tenderness.   Musculoskeletal:      Cervical back: Normal. No spinous process tenderness or muscular tenderness.      Thoracic back: Normal.      Lumbar back: Tenderness present. No bony tenderness.        Back:       Right upper leg: Tenderness present. No swelling, edema or deformity.      Left upper leg: Tenderness present. No swelling, edema or deformity.      Comments: All other adjacent joints normal      Lymphadenopathy:     He has no cervical adenopathy.   Neurological: He is alert. GCS eye subscore is 4. GCS verbal subscore is 5. GCS motor subscore is 6.   Skin: Skin is warm, dry and intact. Capillary refill takes less than 2 seconds.       ED Course   Procedures  Labs Reviewed   COMPREHENSIVE METABOLIC PANEL - Abnormal; Notable for the following components:       Result Value    Protein Total 8.4 (*)     Globulin 3.6 (*)     All other components within normal limits   CBC WITH DIFFERENTIAL - Abnormal; Notable for the following components:    MCHC 32.8 (*)     All other components within normal limits   URINALYSIS, REFLEX TO URINE CULTURE - Abnormal; Notable for the following components:    Protein, UA Trace (*)     Ketones, UA Trace (*)     Bilirubin, UA Small (*)     Urobilinogen, UA 2.0 (*)     All other components within normal limits   URINALYSIS, MICROSCOPIC - Abnormal; Notable for the following components:    Mucous, UA Small (*)     All other components within normal limits   TROPONIN I - Normal   DRUG SCREEN, URINE (BEAKER) - Normal    Narrative:     Cut off concentrations:    Amphetamines - 1000 ng/ml  Barbiturates - 200 ng/ml  Benzodiazepine - 200 ng/ml  Cannabinoids (THC) - 50 ng/ml  Cocaine - 300 ng/ml  Fentanyl - 1.0 ng/ml  MDMA - 500 ng/ml  Opiates - 300 ng/ml   Phencyclidine (PCP) -  25 ng/ml    Specimen submitted for drug analysis and tested for pH and specific gravity in order to evaluate sample integrity. Suspect tampering if specific gravity is <1.003 and/or pH is not within the range of 4.5 - 8.0  False negatives may result form substances such as bleach added to urine.  False positives may result for the presence of a substance with similar chemical structure to the drug or its metabolite.    This test provides only a PRELIMINARY analytical test result. A more specific alternate chemical method must be used in order to obtain a confirmed analytical result. Gas chromatography/mass spectrometry (GC/MS) is the preferred confirmatory method. Other chemical confirmation methods are available. Clinical consideration and professional judgement should be applied to any drug of abuse test result, particularly when preliminary positive results are used.    Positive results will be confirmed only at the physicians request. Unconfirmed screening results are to be used only for medical purposes (treatment).        CBC W/ AUTO DIFFERENTIAL    Narrative:     The following orders were created for panel order CBC Auto Differential.  Procedure                               Abnormality         Status                     ---------                               -----------         ------                     CBC with Differential[[id number]]        Abnormal            Final result                 Please view results for these tests on the individual orders.     EKG Readings: (Independently Interpreted)   Initial Reading: No STEMI. Rhythm: Sinus Arrhythmia. Heart Rate: 70. Conduction: 1st Degree AV Block. Axis: Normal. Other Impression: inus rhythm with sinus arrhythmia with 1st degree AV block     Imaging Results    None          Medications - No data to display  Medical Decision Making:   Initial Assessment:   The patient is a 27 y.o. male who presents to the Emergency Department with a chief complaint of chest  "pain. He also reports burning sensation to his thighs when ambulating x 1 week. Symptoms began 1 week ago and have been intermittent since onset. His pain is currently rated as a 1/10 in severity and described as soreness with no radiation. Associated symptoms include bilateral side pain. Symptoms are aggravated with movement and there are no alleviating factors. The patient denies shortness of breath, nausea, vomiting, diarrhea, fever, or chills. He also denies urinary complaints, cough, cold, or congestion. He reports taking "Lortab" prior to arrival with moderate relief of symptoms. No other reported symptoms at this time. Patient states "I think this is all related to stress".  Differential Diagnosis:   Musculoskeletal pain, lumbar strain, anxiety, ACS  Clinical Tests:   Lab Tests: Ordered and Reviewed  Medical Tests: Ordered and Reviewed  ED Management:  MDM  History obtained by patient.   Co-morbidities and/or factors adding to the complexity or risk for the patient?: none  Differential diagnoses: Musculoskeletal pain, lumbar strain, anxiety, ACS  Decision to obtain previous or outside records?: yes  Chart Review (nursing home, outside records, CareEverywhere): Reviewed previous ER visits. Patient has had multiple ER, some with similar complaints.   Review of RX medications/new RX prescribed by me?: toradol, tizanidine, hydroxyzine  My EKG Interpretation: see above  Labs/imaging/other tests obtained/considered (risk/benefits of testing discussed): CBC, CMP, Troponin, UA, UDS  Labs/tests intepretation: Labs unremarkable  My independent imaging interpretation: none  Treatment/interventions, IV fluids, IV medications: None, patient refused.   Complex management (IV controlled substances, went to OR, DNR, meds requiring monitoring, transfer, etc)?: none  Workup/treatment affected by social determinants of health?: none   Point of care US done/interpretation: None  Consults/radiologist/EMS/social work/family " discussion/alternate history: None  Advanced care planning/end of life discussion: None  Shared decision making: Shared decision making with patient. Patient is amendable to labs, EKG, and urine but does not want meds or other testing at this time.   ETOH/smoking/drug cessation discussion: N/A  Dispo: Discharge home. Patient is well appearing. He reports he has experienced these symptoms before in the past. On exam, he has tenderness bilaterally to lumbar paraspinal muscles. He has full ROM of all extremities. He is smiling and laughing during exam.  He has no leg swelling or tenderness to lower legs. PMS intact. He has no abdominal pain, nausea, or vomiting. His pain is reproducible on exam. Will dc home for patient to follow up outpatient. Patient given strict ER return precautions.            ED Course as of 01/29/23 1437   Sun Jan 29, 2023   1420 Patient feeling better. He is requesting prescription for anxiety medication. Discussed results with patient. Encouraged him to follow up with pcp. He is amendable and ready for dc home.  [LM]      ED Course User Index  [LM] Marilia Greene NP                 Clinical Impression:   Final diagnoses:  [R07.9] Chest pain  [M79.18] Musculoskeletal pain (Primary)  [T14.8XXA] Muscle strain  [F41.9] Anxiety        ED Disposition Condition    Discharge Stable          ED Prescriptions       Medication Sig Dispense Start Date End Date Auth. Provider    tiZANidine (ZANAFLEX) 4 MG tablet Take 1 tablet (4 mg total) by mouth every 6 (six) hours as needed (Muscle Pain). 20 tablet 1/29/2023 2/8/2023 Marilia Greene NP    ketorolac (TORADOL) 10 mg tablet Take 1 tablet (10 mg total) by mouth every 6 (six) hours as needed for Pain. 20 tablet 1/29/2023 2/3/2023 Marilia Greene NP    hydrOXYzine HCL (ATARAX) 25 MG tablet Take 1 tablet (25 mg total) by mouth 3 (three) times daily as needed for Anxiety. 30 tablet 1/29/2023 -- Marilia Greene NP          Follow-up Information        Follow up With Specialties Details Why Contact Info    [facility] - ED   Go to  If symptoms worsen [id number] [facility] [phone]             Marilia Greene NP  01/29/23 1658

## 2023-02-01 ENCOUNTER — HOSPITAL ENCOUNTER (EMERGENCY)
Facility: HOSPITAL | Age: 28
Discharge: HOME | End: 2023-02-01
Attending: STUDENT IN AN ORGANIZED HEALTH CARE EDUCATION/TRAINING PROGRAM
Payer: MEDICAID

## 2023-02-01 VITALS
RESPIRATION RATE: 18 BRPM | HEART RATE: 69 BPM | BODY MASS INDEX: 21.5 KG/M2 | TEMPERATURE: 98 F | WEIGHT: 137 LBS | OXYGEN SATURATION: 99 % | SYSTOLIC BLOOD PRESSURE: 138 MMHG | HEIGHT: 67 IN | DIASTOLIC BLOOD PRESSURE: 95 MMHG

## 2023-02-01 DIAGNOSIS — Z20.2 POSSIBLE EXPOSURE TO STD: Primary | ICD-10-CM

## 2023-02-01 LAB
APPEARANCE UR: CLEAR
BILIRUB UR QL STRIP.AUTO: NEGATIVE MG/DL
COLOR UR AUTO: NORMAL
GLUCOSE UR QL STRIP.AUTO: NEGATIVE MG/DL
KETONES UR QL STRIP.AUTO: NEGATIVE MG/DL
LEUKOCYTE ESTERASE UR QL STRIP.AUTO: NEGATIVE UNIT/L
NITRITE UR QL STRIP.AUTO: NEGATIVE
PH UR STRIP.AUTO: 6.5 [PH]
PROT UR QL STRIP.AUTO: NEGATIVE MG/DL
RBC UR QL AUTO: NEGATIVE UNIT/L
SP GR UR STRIP.AUTO: 1.02
UROBILINOGEN UR STRIP-ACNC: 0.2 MG/DL

## 2023-02-01 PROCEDURE — 99282 EMERGENCY DEPT VISIT SF MDM: CPT | Mod: 25

## 2023-02-01 PROCEDURE — 87591 N.GONORRHOEAE DNA AMP PROB: CPT | Performed by: STUDENT IN AN ORGANIZED HEALTH CARE EDUCATION/TRAINING PROGRAM

## 2023-02-01 PROCEDURE — 81003 URINALYSIS AUTO W/O SCOPE: CPT | Performed by: STUDENT IN AN ORGANIZED HEALTH CARE EDUCATION/TRAINING PROGRAM

## 2023-02-02 ENCOUNTER — HOSPITAL ENCOUNTER (EMERGENCY)
Facility: HOSPITAL | Age: 28
Discharge: HOME | End: 2023-02-02
Attending: STUDENT IN AN ORGANIZED HEALTH CARE EDUCATION/TRAINING PROGRAM
Payer: MEDICAID

## 2023-02-02 VITALS
HEART RATE: 93 BPM | WEIGHT: 137 LBS | DIASTOLIC BLOOD PRESSURE: 87 MMHG | OXYGEN SATURATION: 98 % | BODY MASS INDEX: 21.5 KG/M2 | TEMPERATURE: 99 F | SYSTOLIC BLOOD PRESSURE: 136 MMHG | RESPIRATION RATE: 20 BRPM | HEIGHT: 67 IN

## 2023-02-02 DIAGNOSIS — R10.9 BILATERAL FLANK PAIN: Primary | ICD-10-CM

## 2023-02-02 LAB
C TRACH DNA SPEC QL NAA+PROBE: NOT DETECTED
N GONORRHOEA DNA SPEC QL NAA+PROBE: NOT DETECTED

## 2023-02-02 PROCEDURE — 99283 EMERGENCY DEPT VISIT LOW MDM: CPT

## 2023-02-02 PROCEDURE — 25000003 PHARM REV CODE 250: Performed by: PHYSICIAN ASSISTANT

## 2023-02-02 RX ORDER — KETOROLAC TROMETHAMINE 10 MG/1
10 TABLET, FILM COATED ORAL EVERY 6 HOURS PRN
Qty: 20 TABLET | Refills: 0 | Status: SHIPPED | OUTPATIENT
Start: 2023-02-02 | End: 2023-02-07

## 2023-02-02 RX ORDER — KETOROLAC TROMETHAMINE 10 MG/1
10 TABLET, FILM COATED ORAL
Status: COMPLETED | OUTPATIENT
Start: 2023-02-02 | End: 2023-02-02

## 2023-02-02 RX ADMIN — KETOROLAC TROMETHAMINE 10 MG: 10 TABLET, FILM COATED ORAL at 09:02

## 2023-02-02 NOTE — ED PROVIDER NOTES
Encounter Date: 2/1/2023       History     Chief Complaint   Patient presents with    Exposure to STD     HPI    27-year-old male presents emergency department for STD screening.  States he has no symptoms.  States he started having sexual relations with a new woman.  Patient states he is concerned about STDs.  States he tested positive for gonorrhea and chlamydia few times.    Review of patient's allergies indicates:   Allergen Reactions    Penicillins Other (See Comments)     No past medical history on file.  Past Surgical History:   Procedure Laterality Date    axillary gland       No family history on file.  Social History     Tobacco Use    Smoking status: Never    Smokeless tobacco: Never   Substance Use Topics    Alcohol use: Not Currently     Comment: socially     Review of Systems   Constitutional:  Negative for fever.   Genitourinary:  Negative for difficulty urinating, dysuria, frequency, genital sores, hematuria, penile discharge, penile pain, penile swelling, scrotal swelling, testicular pain and urgency.   All other systems reviewed and are negative.    Physical Exam     Initial Vitals [02/01/23 2224]   BP Pulse Resp Temp SpO2   (!) 138/95 69 18 98.4 °F (36.9 °C) 99 %      MAP       --         Physical Exam    Nursing note and vitals reviewed.  Constitutional: He appears well-developed and well-nourished. No distress.   Cardiovascular:  Normal rate and regular rhythm.           Pulmonary/Chest: Breath sounds normal. No respiratory distress.   Abdominal: Abdomen is soft. Bowel sounds are normal. There is no abdominal tenderness.     Skin: Skin is warm. Capillary refill takes less than 2 seconds. No rash noted.   Psychiatric: He has a normal mood and affect. Thought content normal.       ED Course   Procedures  Labs Reviewed   CHLAMYDIA/GONORRHOEAE(GC), PCR   URINALYSIS, REFLEX TO URINE CULTURE          Imaging Results    None          Medications - No data to display  Medical Decision Making:    Differential Diagnosis:   STD exposure, STD  ED Management:  I had a long discussion with patient in regards to STD screening.  I informed him emergency department is not the place to come just to get screen.  I informed him that we only checked for gonorrhea and chlamydia and if he is that concerned about STDs he needs to worry about other STDs such as HIV and syphilis.  Informed patient to follow with the health unit.  Informed patient of with sexual protection.   Medical Decision Making  Problems Addressed:  Possible exposure to STD: chronic illness or injury    Amount and/or Complexity of Data Reviewed  External Data Reviewed: labs and notes.     Details: Reviewed patient's previous ER visits and urinalysis  Labs: ordered. Decision-making details documented in ED Course.    Risk  OTC drugs.  Prescription drug management.              ED Course as of 02/01/23 2253 Wed Feb 01, 2023 2253 NITRITE UA: Negative [BS]   2253 Leukocytes, UA: Negative [BS]   2253 Glucose, UA: Negative [BS]   2253 Protein, UA: Negative [BS]   2253 Appearance, UA: Clear [BS]   2253 Color, UA: Straw [BS]   2253 Specific Gravity,UA: 1.020 [BS]      ED Course User Index  [BS] Pasquale Justice MD                 Clinical Impression:   Final diagnoses:  [Z20.2] Possible exposure to STD (Primary)        ED Disposition Condition    Discharge Stable          ED Prescriptions    None       Follow-up Information       Follow up With Specialties Details Why Contact Info    [facility] - Emergency Dept Emergency Medicine Go to  If symptoms worsen [id number]  [facility] [id number] [phone]    You need to follow-up with the health unit.  The emergency department is not the place to get STD screening.                 Pasquale Justice MD  02/01/23 2253

## 2023-02-03 NOTE — ED PROVIDER NOTES
Encounter Date: 2/2/2023       History     Chief Complaint   Patient presents with    Abdominal Pain     Occasional abdominal pain--occasional dysuria-     27-year-old male presents to ED for evaluation of bilateral flank pain.  States he has been ongoing for last 2-3 months.  States the pain fluctuates in intensity.  Patient complains of burning on urination. Denies any blood in his urine or decreased urination.  Patient concerned for possible STD.  Patient states that he was tested yesterday but did not know his results.  Requesting a rapid test    The history is provided by the patient. No  was used.   Review of patient's allergies indicates:   Allergen Reactions    Penicillins Other (See Comments)     No past medical history on file.  Past Surgical History:   Procedure Laterality Date    axillary gland       History reviewed. No pertinent family history.  Social History     Tobacco Use    Smoking status: Never    Smokeless tobacco: Never   Substance Use Topics    Alcohol use: Not Currently     Comment: socially     Review of Systems   Constitutional:  Negative for chills, diaphoresis and fever.   Respiratory:  Negative for shortness of breath.    Cardiovascular:  Negative for chest pain.   Gastrointestinal:  Positive for abdominal pain. Negative for diarrhea, nausea and vomiting.   Genitourinary:  Positive for dysuria. Negative for flank pain, frequency, penile discharge, penile pain, penile swelling, scrotal swelling, testicular pain and urgency.   Musculoskeletal:  Negative for back pain.   Skin:  Negative for rash.   Neurological:  Negative for dizziness and weakness.   Hematological:  Does not bruise/bleed easily.   All other systems reviewed and are negative.    Physical Exam     Initial Vitals [02/02/23 2051]   BP Pulse Resp Temp SpO2   136/87 93 20 99.4 °F (37.4 °C) 98 %      MAP       --         Physical Exam    Nursing note and vitals reviewed.  Constitutional: He appears  well-developed. He is cooperative.   HENT:   Head: Normocephalic and atraumatic.   Right Ear: External ear normal.   Left Ear: External ear normal.   Eyes: Conjunctivae are normal. Pupils are equal, round, and reactive to light.   Neck: Neck supple.   Normal range of motion.  Cardiovascular:  Normal rate, regular rhythm and normal heart sounds.           Pulmonary/Chest: Breath sounds normal. No respiratory distress. He has no wheezes. He has no rhonchi. He has no rales.   Abdominal: Abdomen is soft. Bowel sounds are normal. There is no abdominal tenderness.   Musculoskeletal:         General: Normal range of motion.      Cervical back: Normal range of motion and neck supple.     Neurological: He is alert and oriented to person, place, and time.   Skin: Skin is warm and dry. Capillary refill takes less than 2 seconds.   Psychiatric: He has a normal mood and affect.       ED Course   Procedures  Labs Reviewed - No data to display       Imaging Results    None          Medications   ketorolac tablet 10 mg (10 mg Oral Given 2/2/23 2106)     Medical Decision Making:   Differential Diagnosis:   Judging by the patient's chief complaint and pertinent history, the patient has the following possible differential diagnoses, including but not limited to the following.  Some of these are deemed to be lower likelihood and some more likely based on my physical exam and history combined with possible lab work and/or imaging studies. Please see the pertinent studies, and refer to the HPI.  Some of these diagnoses will take further evaluation to fully rule out, perhaps as an outpatient and the patient was encouraged to follow up when discharged for more comprehensive evaluation.     STI (GC/chlamydia/trich/herpes), UTI/pyelo, biliary disease, kidney stone, appendicitis, GERD    ED Management:  27-year-old male presents to ED for evaluation of intermittent flank pain with urination.  Patient seen here yesterday with negative UA along  with negative gonorrhea and chlamydia.  Offered patient workup including repeat UA along with blood work, patient declined at this time.  Discussed with patient that not doing any blood limits my diagnostic ability. Patient states that he just would like something for pain. Abdomen soft, non tender and non surgical.  Will give short course of Toradol.  Discussed follow-up with PCP and [facility].  Patient verbalizes understanding and agrees with plan of care.                        Clinical Impression:   Final diagnoses:  [R10.9] Bilateral flank pain (Primary)        ED Disposition Condition    Discharge Stable          ED Prescriptions       Medication Sig Dispense Start Date End Date Auth. Provider    ketorolac (TORADOL) 10 mg tablet Take 1 tablet (10 mg total) by mouth every 6 (six) hours as needed for Pain. 20 tablet 2/2/2023 2/7/2023 ESTEFANÍA Naranjo          Follow-up Information       Follow up With Specialties Details Why Contact Info    PCP  In 1 week As needed     [facility]  Go to   [address] [place] [address]  Phone #[phone]             ESTEFANÍA Naranjo  02/02/23 211

## 2023-02-03 NOTE — DISCHARGE INSTRUCTIONS
Drink plenty of fluids.  May use Toradol for pain.  Follow up with PCP/[facility] for further evaluation.

## 2023-03-27 ENCOUNTER — HOSPITAL ENCOUNTER (EMERGENCY)
Facility: HOSPITAL | Age: 28
Discharge: HOME | End: 2023-03-27
Attending: INTERNAL MEDICINE
Payer: MEDICAID

## 2023-03-27 VITALS
OXYGEN SATURATION: 99 % | HEIGHT: 67 IN | RESPIRATION RATE: 18 BRPM | DIASTOLIC BLOOD PRESSURE: 91 MMHG | BODY MASS INDEX: 20.56 KG/M2 | TEMPERATURE: 98 F | HEART RATE: 75 BPM | SYSTOLIC BLOOD PRESSURE: 131 MMHG | WEIGHT: 131 LBS

## 2023-03-27 DIAGNOSIS — K12.0 CANKER SORE: ICD-10-CM

## 2023-03-27 DIAGNOSIS — J02.9 VIRAL PHARYNGITIS: Primary | ICD-10-CM

## 2023-03-27 PROCEDURE — 99283 EMERGENCY DEPT VISIT LOW MDM: CPT

## 2023-03-27 PROCEDURE — 0240U COVID/FLU A&B PCR: CPT | Performed by: EMERGENCY MEDICINE

## 2023-03-27 PROCEDURE — 87651 STREP A DNA AMP PROBE: CPT | Performed by: EMERGENCY MEDICINE

## 2023-03-27 PROCEDURE — 25000003 PHARM REV CODE 250

## 2023-03-27 RX ORDER — NAPROXEN 500 MG/1
500 TABLET ORAL 2 TIMES DAILY WITH MEALS
Qty: 20 TABLET | Refills: 0 | Status: SHIPPED | OUTPATIENT
Start: 2023-03-27 | End: 2023-09-10 | Stop reason: ALTCHOICE

## 2023-03-27 RX ORDER — NAPROXEN 500 MG/1
500 TABLET ORAL
Status: COMPLETED | OUTPATIENT
Start: 2023-03-27 | End: 2023-03-27

## 2023-03-27 RX ADMIN — NAPROXEN 500 MG: 500 TABLET ORAL at 08:03

## 2023-03-28 NOTE — DISCHARGE INSTRUCTIONS
Thanks for letting us take care of you today!  It is our goal to give you courteous care and to keep you comfortable and informed, if you have any questions before you leave I will be happy to try and answer them.    Here is some advice after your visit:      Your visit in the emergency department is NOT definitive care - please follow-up with your primary care doctor and/or specialist within 1 week.  Please return if you have any worsening in your condition or if you have any other concerns.    Please review any LAB WORK from your visit today with your primary care physician.    You have been prescribed Naproxen for pain. This is an Non-Steroidal Anti-Inflammatory (NSAID) Medication. Please do not take any additional NSAIDs while you are taking this medication including (Advil, Aleve, Motrin, Ibuprofen, Mobic\meloxicam, Naprosyn, Toradol, etc.). Please stop taking this medication if you experience: weakness, itching, yellow skin or eyes, joint pains, vomiting blood, blood or black stools, unusual weight gain, or swelling in your arms, legs, hands, or feet.

## 2023-03-28 NOTE — ED PROVIDER NOTES
Encounter Date: 3/27/2023       History     Chief Complaint   Patient presents with    Sore Throat     Pt to er c/o sorethroat pain onset Thursday after vomiting.     27 y.o.  male presents to Emergency Department with a chief complaint of sore throat. Symptoms began 4 days ago and have been constant since onset. Associated symptoms include oral lesion to mouth. The patient denies CP, SOB, weakness, fever, chills, cough, or headache. No other reported symptoms at this time      The history is provided by the patient. No  was used.   Sore Throat   This is a new problem. The sore throat symptoms include sore throat.The current episode started several days ago. The problem has been unchanged. There has been no fever. Pertinent negatives include no abdominal pain, drooling, ear discharge, shortness of breath, stridor, trouble swallowing or vomiting. He has tried nothing for the symptoms.   Review of patient's allergies indicates:   Allergen Reactions    Penicillins Other (See Comments)     History reviewed. No pertinent past medical history.  Past Surgical History:   Procedure Laterality Date    axillary gland       No family history on file.  Social History     Tobacco Use    Smoking status: Never    Smokeless tobacco: Never   Substance Use Topics    Alcohol use: Not Currently     Comment: socially     Review of Systems   Constitutional:  Negative for chills, fatigue and fever.   HENT:  Positive for sore throat. Negative for drooling, ear discharge, rhinorrhea, trouble swallowing and voice change.    Eyes:  Negative for photophobia and visual disturbance.   Respiratory:  Negative for shortness of breath, wheezing and stridor.    Cardiovascular:  Negative for chest pain and leg swelling.   Gastrointestinal:  Negative for abdominal pain, nausea and vomiting.   All other systems reviewed and are negative.    Physical Exam     Initial Vitals [03/27/23 1530]   BP Pulse Resp Temp SpO2    (!) 131/91 75 18 97.7 °F (36.5 °C) 99 %      MAP       --         Physical Exam    Nursing note and vitals reviewed.  Constitutional: He appears well-developed and well-nourished. He is not diaphoretic. He is cooperative.  Non-toxic appearance. No distress.   HENT:   Head: Normocephalic and atraumatic.   Right Ear: External ear normal.   Left Ear: External ear normal.   Nose: Nose normal.   Mouth/Throat: Mucous membranes are normal. Oral lesions present. Posterior oropharyngeal erythema present. No oropharyngeal exudate or posterior oropharyngeal edema.   Small, round whitish, spots, noted to roof of patient's mouth.    Eyes: Conjunctivae and EOM are normal. Pupils are equal, round, and reactive to light. Right eye exhibits no discharge. Left eye exhibits no discharge.   Neck: Neck supple. No thyromegaly present. No JVD present.   Normal range of motion.  Cardiovascular:  Normal rate, regular rhythm, normal heart sounds and intact distal pulses.           Pulmonary/Chest: Breath sounds normal. No stridor. No respiratory distress. He has no wheezes. He exhibits no tenderness.   Abdominal: Abdomen is soft. Bowel sounds are normal. He exhibits no distension. There is no abdominal tenderness. There is no guarding.   Musculoskeletal:         General: No tenderness or edema. Normal range of motion.      Cervical back: Normal range of motion and neck supple.     Neurological: He is alert and oriented to person, place, and time. He has normal strength. No sensory deficit. GCS score is 15. GCS eye subscore is 4. GCS verbal subscore is 5. GCS motor subscore is 6.   Skin: Skin is warm and dry. Capillary refill takes less than 2 seconds. No rash noted. No erythema.   Psychiatric: He has a normal mood and affect. Thought content normal.       ED Course   Procedures  Labs Reviewed   STREP GROUP A BY PCR - Normal    Narrative:     The Xpert Xpress Strep A test is a rapid, qualitative in vitro diagnostic test for the detection of  Streptococcus pyogenes (Group A ß-hemolytic Streptococcus, Strep A) in throat swab specimens from patients with signs and symptoms of pharyngitis.     COVID/FLU A&B PCR - Normal    Narrative:     The Xpert Xpress SARS-CoV-2/FLU/RSV plus is a rapid, multiplexed real-time PCR test intended for the simultaneous qualitative detection and differentiation of SARS-CoV-2, Influenza A, Influenza B, and respiratory syncytial virus (RSV) viral RNA in either nasopharyngeal swab or nasal swab specimens.                Imaging Results    None          Medications   naproxen tablet 500 mg (500 mg Oral Given 3/27/23 2016)     Medical Decision Making:   Initial Assessment:   Patient awake, alert, has non-labored breathing, and follows commands appropriately. C/o sore throat and oral lesions that began 4 days ago. NAD noted.   Differential Diagnosis:   Strep, COVID, Flu, Viral Pharyngitis, Canker Sore   Clinical Tests:   Lab Tests: Ordered and Reviewed  ED Management:  Co-morbidities and/or factors adding to the complexity or risk for the patient?: none  Differential diagnoses: Strep, COVID, Flu, Viral Pharyngitis, Canker Sore   Decision to obtain previous or outside records?: I did not review records.   Chart Review (nursing home, outside records, CareEverywhere): none  Review of RX medications/new RX prescribed by me?: Prescribed Phenol for sore throat and Naproxen for pain. Cautioned on side effects.   Labs/imaging/other tests obtained/considered (risk/benefits of testing discussed): covid, flu, strep  Labs/tests intepretation: Labs unremarkable. Informed patient of results.   My independent imaging interpretation: none  Treatment/interventions, IV fluids, IV medications: Naproxen given in ER.   Complex management (IV controlled substances, went to OR, DNR, meds requiring monitoring, transfer, etc)?: none  Workup/treatment affected by social determinants of health?:none   Point of care US done/interpretation:  none  Consults/radiologist/EMS/social work/family discussion/alternate history: none  Advanced care planning/end of life discussion: none  Shared decision making: Discussed plan of care and interventions with patient. Agreed to and aware of plan of care. Comfortable being discharged home.   ETOH/smoking/drug cessation discussion: none  Dispo: Patient discharged home. Patient denies new or additional complaints; no further tests indicated at this time. Verbalized understanding of instructions. No emergent or apparent distress noted prior to discharge. To follow up with PCP in 1 week as needed. Strict ER return precautions given.                             Clinical Impression:   Final diagnoses:  [J02.9] Viral pharyngitis (Primary)  [K12.0] Canker sore        ED Disposition Condition    Discharge Stable          ED Prescriptions       Medication Sig Dispense Start Date End Date Auth. Provider    naproxen (NAPROSYN) 500 MG tablet Take 1 tablet (500 mg total) by mouth 2 (two) times daily with meals. 20 tablet 3/27/2023 -- Arlene Rodriguez NP    phenoL (CHLORASEPTIC) 1.4 % SprA by Mucous Membrane route every 2 (two) hours. 20 mL 3/27/2023 -- Arlene Rodriguez NP          Follow-up Information       Follow up With Specialties Details Why Contact Info    PCP  Call in 1 week As needed, If symptoms worsen     [facility] - Emergency Dept Emergency Medicine Go to  If symptoms worsen, As needed [id number]  [facility] [id number] [phone]             Arlene Rodriguez NP  03/27/23 2024

## 2023-03-29 ENCOUNTER — PATIENT OUTREACH (OUTPATIENT)
Dept: EMERGENCY MEDICINE | Facility: HOSPITAL | Age: 28
End: 2023-03-29
Payer: MEDICAID

## 2023-03-30 NOTE — PROGRESS NOTES
Identity verified.  Pt states the sore throat has resolved.  He states he filled and is taking the medications as prescribed.  Instructed not to drive while taking pain medication.  He denies any questions about the medications prescribed or ED discharge instructions. Pt states he has a PCP appt next week.  He is unsure of the date.  He states he has not seen his PCP in over a year.  Educated on the benefits of having a PCP, eating a healthy diet, drinking plenty of water, when/where to get medical care.  Pt states he has been to the dentist in the last year.  Educated on the benefits of oral cleanings every 6 months.  Pt denies having anxiety or depression.  Patient denies any SDOH barriers at this time and states he feels safe in his home.  Stressed the importance of keeping appointments and instructed on the use of urgent care clinic for non emergent issues when PCP unavailable.  Patient verbalized understanding to all instructions.     Offered to enroll in [facility] program, patient declined.  Encounter closed.    Appointments   PCP Visit Upcoming :    Yes  Appointment Date/Time :   Next week (pt unsure of date)  PCP Visit Upcoming Reason : Regular  PCP Visit Within Year :   No  PCP Visit Within Year Reason:      PCP Visit One Year Date :    Follow-Up Specialist Appt Scheduled :  No  Type of Specialist :     Follow-Up Lab Appt Scheduled :    Follow-Up Date :      Follow-Up Radiology Appt Scheduled :    Radiology Orders :      Providers Patient Visited Last Year :    Dentist     Statement Selected

## 2023-07-21 ENCOUNTER — HOSPITAL ENCOUNTER (EMERGENCY)
Facility: HOSPITAL | Age: 28
Discharge: HOME | End: 2023-07-21
Attending: STUDENT IN AN ORGANIZED HEALTH CARE EDUCATION/TRAINING PROGRAM
Payer: COMMERCIAL

## 2023-07-21 VITALS
RESPIRATION RATE: 18 BRPM | HEART RATE: 85 BPM | DIASTOLIC BLOOD PRESSURE: 83 MMHG | WEIGHT: 130 LBS | TEMPERATURE: 98 F | BODY MASS INDEX: 20.4 KG/M2 | SYSTOLIC BLOOD PRESSURE: 122 MMHG | OXYGEN SATURATION: 100 % | HEIGHT: 67 IN

## 2023-07-21 DIAGNOSIS — R30.0 DYSURIA: Primary | ICD-10-CM

## 2023-07-21 LAB
APPEARANCE UR: CLEAR
BILIRUB UR QL STRIP.AUTO: NEGATIVE
COLOR UR: YELLOW
GLUCOSE UR QL STRIP.AUTO: NEGATIVE
KETONES UR QL STRIP.AUTO: NEGATIVE
LEUKOCYTE ESTERASE UR QL STRIP.AUTO: NEGATIVE
NITRITE UR QL STRIP.AUTO: NEGATIVE
PH UR STRIP.AUTO: 6.5 [PH]
PROT UR QL STRIP.AUTO: NEGATIVE
RBC UR QL AUTO: NEGATIVE
SP GR UR STRIP.AUTO: 1.02
UROBILINOGEN UR STRIP-ACNC: 1

## 2023-07-21 PROCEDURE — 99282 EMERGENCY DEPT VISIT SF MDM: CPT

## 2023-07-21 PROCEDURE — 87591 N.GONORRHOEAE DNA AMP PROB: CPT

## 2023-07-21 PROCEDURE — 81003 URINALYSIS AUTO W/O SCOPE: CPT

## 2023-07-21 NOTE — ED TRIAGE NOTES
"Pt complaint of painful urination, frequent urination and hesitancy with urination for 3 days as pt express "I feel like Im peeing on myself"  "

## 2023-07-22 LAB
C TRACH DNA SPEC QL NAA+PROBE: NOT DETECTED
N GONORRHOEA DNA SPEC QL NAA+PROBE: NOT DETECTED
SOURCE (OHS): NORMAL

## 2023-07-22 NOTE — ED PROVIDER NOTES
"Encounter Date: 7/21/2023       History     Chief Complaint   Patient presents with    Dysuria     Pt complaint of painful urination, frequent urination and hesitancy with urination for 3 days as pt express "I feel like Im peeing on myself"     27 y.o.  male presents to Emergency Department with a chief complaint of dysuria. Symptoms began three days ago and have been constant since onset. Associated symptoms include frequency. Symptoms are aggravated with urinating and there are no alleviating factors. Patient reports he was tested for STDs on 07/13/23, was negative, and has not had unprotected sex since. The patient denies CP, SOB, fever, penile discharge, or abdominal pain. No other reported symptoms at this time      The history is provided by the patient. No  was used.   Dysuria   This is a new problem. The current episode started several days ago. The problem occurs intermittently. The problem has been unchanged. The quality of the pain is described as burning. He is Sexually active. Associated symptoms include frequency and hesitancy. Pertinent negatives include no chills, no vomiting, no discharge, no urgency and no flank pain. He has tried nothing for the symptoms.   Review of patient's allergies indicates:   Allergen Reactions    Penicillins Other (See Comments)     No past medical history on file.  Past Surgical History:   Procedure Laterality Date    axillary gland       No family history on file.  Social History     Tobacco Use    Smoking status: Never    Smokeless tobacco: Never   Substance Use Topics    Alcohol use: Not Currently     Comment: socially     Review of Systems   Constitutional:  Negative for chills, fatigue and fever.   Eyes:  Negative for photophobia and visual disturbance.   Respiratory:  Negative for cough, shortness of breath, wheezing and stridor.    Cardiovascular:  Negative for chest pain, palpitations and leg swelling.   Gastrointestinal:  " Negative for vomiting.   Genitourinary:  Positive for dysuria, frequency and hesitancy. Negative for flank pain, penile discharge, penile pain, penile swelling, scrotal swelling and urgency.   All other systems reviewed and are negative.    Physical Exam     Initial Vitals [07/21/23 1827]   BP Pulse Resp Temp SpO2   122/83 85 18 98.2 °F (36.8 °C) 100 %      MAP       --         Physical Exam    Nursing note and vitals reviewed.  Constitutional: He appears well-developed and well-nourished. He is not diaphoretic. He is cooperative.  Non-toxic appearance. No distress.   HENT:   Head: Normocephalic and atraumatic.   Right Ear: External ear normal.   Left Ear: External ear normal.   Nose: Nose normal.   Mouth/Throat: Oropharynx is clear and moist. No oropharyngeal exudate.   Eyes: Conjunctivae and EOM are normal. Pupils are equal, round, and reactive to light.   Neck: Neck supple.   Normal range of motion.  Cardiovascular:  Normal rate, regular rhythm, S1 normal, S2 normal, normal heart sounds, intact distal pulses and normal pulses.           Pulmonary/Chest: Effort normal and breath sounds normal. No respiratory distress. He has no wheezes. He exhibits no tenderness.   Abdominal: Abdomen is soft. Bowel sounds are normal. He exhibits no distension. There is no abdominal tenderness.   Musculoskeletal:         General: No tenderness or edema. Normal range of motion.      Cervical back: Normal range of motion and neck supple.     Neurological: He is alert and oriented to person, place, and time. He has normal strength. No sensory deficit.   Skin: Skin is warm and dry. Capillary refill takes less than 2 seconds. No rash noted. No erythema.   Psychiatric: He has a normal mood and affect. Thought content normal.       ED Course   Procedures  Labs Reviewed   CHLAMYDIA/GONORRHOEAE(GC), PCR   URINALYSIS, REFLEX TO URINE CULTURE          Imaging Results    None          Medications - No data to display  Medical Decision Making:    Initial Assessment:   Patient awake, alert, has non-labored breathing, and follows commands appropriately. C/o dysuria, frequency, and hesitancy that began three days ago. Wants to be tested again for STDs. Afebrile. NAD noted.   Differential Diagnosis:   UTI, Urethritis, Chlamydia   Clinical Tests:   Lab Tests: Ordered and Reviewed  ED Management:  Co-morbidities and/or factors adding to the complexity or risk for the patient?: none  Differential diagnoses: UTI, Urethritis, Chlamydia   Decision to obtain previous or outside records?: I did not review records.   Chart Review (nursing home, outside records, CareEverywhere): none  Review of RX medications/new RX prescribed by me?: none  Labs/imaging/other tests obtained/considered (risk/benefits of testing discussed): ua & gc/c  Labs/tests intepretation: UA negative. Informed patient of results.   My independent imaging interpretation: none  Treatment/interventions, IV fluids, IV medications: none  Complex management (IV controlled substances, went to OR, DNR, meds requiring monitoring, transfer, etc)?: none  Workup/treatment affected by social determinants of health?:none   Point of care US done/interpretation: none  Consults/radiologist/EMS/social work/family discussion/alternate history: none  Advanced care planning/end of life discussion: none  Shared decision making: Discussed plan of care and interventions with patient. Agreed to and aware of plan of care. Comfortable being discharged home. Urology information placed in paperwork.    ETOH/smoking/drug cessation discussion: none  Dispo: Patient discharged home. Patient denies new or additional complaints; no further tests indicated at this time. Verbalized understanding of instructions. No emergent or apparent distress noted prior to discharge. To follow up with PCP in 1 week as needed. Strict ER return precautions given.                             Clinical Impression:   Final diagnoses:  [R30.0] Dysuria  (Primary)        ED Disposition Condition    Discharge Stable          ED Prescriptions    None       Follow-up Information       Follow up With Specialties Details Why Contact Info    PCP  Call in 1 week As needed, If symptoms worsen     [facility] - Emergency Dept Emergency Medicine Go to  If symptoms worsen, As needed [id number]  [facility] [phone]    Follow up with Health unit for further testing.                 Arlene Rodriguez NP  07/21/23 8357

## 2023-07-25 ENCOUNTER — HOSPITAL ENCOUNTER (EMERGENCY)
Facility: HOSPITAL | Age: 28
Discharge: HOME | End: 2023-07-25
Attending: EMERGENCY MEDICINE
Payer: COMMERCIAL

## 2023-07-25 VITALS
BODY MASS INDEX: 20.36 KG/M2 | HEART RATE: 81 BPM | SYSTOLIC BLOOD PRESSURE: 104 MMHG | WEIGHT: 130 LBS | RESPIRATION RATE: 19 BRPM | TEMPERATURE: 98 F | OXYGEN SATURATION: 99 % | DIASTOLIC BLOOD PRESSURE: 71 MMHG

## 2023-07-25 DIAGNOSIS — R30.0 DYSURIA: ICD-10-CM

## 2023-07-25 DIAGNOSIS — Z20.2 STD EXPOSURE: Primary | ICD-10-CM

## 2023-07-25 LAB
APPEARANCE UR: CLEAR
BACTERIA #/AREA URNS AUTO: ABNORMAL /HPF
BILIRUB UR QL STRIP.AUTO: NEGATIVE
C TRACH DNA SPEC QL NAA+PROBE: NOT DETECTED
COLOR UR: ABNORMAL
GLUCOSE UR QL STRIP.AUTO: NEGATIVE
KETONES UR QL STRIP.AUTO: ABNORMAL
LEUKOCYTE ESTERASE UR QL STRIP.AUTO: NEGATIVE
N GONORRHOEA DNA SPEC QL NAA+PROBE: NOT DETECTED
NITRITE UR QL STRIP.AUTO: NEGATIVE
PH UR STRIP.AUTO: 6.5 [PH]
PROT UR QL STRIP.AUTO: NEGATIVE
RBC #/AREA URNS AUTO: <5 /HPF
RBC UR QL AUTO: NEGATIVE
SOURCE (OHS): NORMAL
SP GR UR STRIP.AUTO: 1.02 (ref 1–1.03)
SQUAMOUS #/AREA URNS AUTO: <5 /HPF
UROBILINOGEN UR STRIP-ACNC: 1
WBC #/AREA URNS AUTO: 11 /HPF

## 2023-07-25 PROCEDURE — 96372 THER/PROPH/DIAG INJ SC/IM: CPT | Performed by: EMERGENCY MEDICINE

## 2023-07-25 PROCEDURE — 63600175 PHARM REV CODE 636 W HCPCS: Performed by: EMERGENCY MEDICINE

## 2023-07-25 PROCEDURE — 99284 EMERGENCY DEPT VISIT MOD MDM: CPT

## 2023-07-25 PROCEDURE — 81001 URINALYSIS AUTO W/SCOPE: CPT | Performed by: EMERGENCY MEDICINE

## 2023-07-25 PROCEDURE — 87088 URINE BACTERIA CULTURE: CPT | Performed by: EMERGENCY MEDICINE

## 2023-07-25 PROCEDURE — 87591 N.GONORRHOEAE DNA AMP PROB: CPT | Performed by: EMERGENCY MEDICINE

## 2023-07-25 RX ORDER — CEFTRIAXONE 1 G/1
0.5 INJECTION, POWDER, FOR SOLUTION INTRAMUSCULAR; INTRAVENOUS
Status: COMPLETED | OUTPATIENT
Start: 2023-07-25 | End: 2023-07-25

## 2023-07-25 RX ORDER — DOXYCYCLINE HYCLATE 100 MG
100 TABLET ORAL 2 TIMES DAILY
Qty: 14 TABLET | Refills: 0 | Status: SHIPPED | OUTPATIENT
Start: 2023-07-25 | End: 2023-09-10 | Stop reason: ALTCHOICE

## 2023-07-25 RX ADMIN — CEFTRIAXONE SODIUM 0.5 G: 1 INJECTION, POWDER, FOR SOLUTION INTRAMUSCULAR; INTRAVENOUS at 07:07

## 2023-07-25 NOTE — Clinical Note
"Jun Vasquez" Reji was seen and treated in our emergency department on 7/25/2023.  He may return to work on 07/26/2023.       If you have any questions or concerns, please don't hesitate to call.      Krystal OCHOA    "

## 2023-07-25 NOTE — ED PROVIDER NOTES
"Encounter Date: 7/25/2023    SCRIBE #1 NOTE: I, Michael Puga, am scribing for, and in the presence of,  Dr. Merrill. I have scribed the following portions of the note - Other sections scribed: HPI, ROS, Physical Exam, MDM, Attending.     History     Chief Complaint   Patient presents with    Exposure to STD     Patient thinks he has STD wants to be tested, burning on urination denies discharge     26 y/o male presents to ED for dysuria onset several days ago.  Pt also says it "feels like he is peeing himself."  He was seen at another ED recently with negative STD test, but he is still concerned for possible STD.  He states he was last sexually active months ago and does typically use protection.  He has no recent sexual partners with known STD.  Pt says his dysuria improves when he drinks more water.  His last BM was 2 days ago, which is normal for him.  He denies penile discharge.    The history is provided by the patient.   Review of patient's allergies indicates:   Allergen Reactions    Penicillins Other (See Comments)     No past medical history on file.  Past Surgical History:   Procedure Laterality Date    axillary gland       No family history on file.  Social History     Tobacco Use    Smoking status: Never    Smokeless tobacco: Never   Substance Use Topics    Alcohol use: Not Currently     Comment: socially     Review of Systems   Gastrointestinal:  Negative for constipation.   Genitourinary:  Positive for dysuria. Negative for penile discharge.     Physical Exam     Initial Vitals [07/25/23 0434]   BP Pulse Resp Temp SpO2   104/71 72 18 98 °F (36.7 °C) 99 %      MAP       --         Physical Exam    Nursing note and vitals reviewed.  Constitutional: No distress.   HENT:   Head: Normocephalic and atraumatic.   Eyes: Conjunctivae and EOM are normal. Pupils are equal, round, and reactive to light.   Neck: Trachea normal. Neck supple.   Normal range of motion.  Cardiovascular:  Normal rate and regular rhythm. "           No murmur heard.  Pulmonary/Chest: Breath sounds normal. No respiratory distress. He exhibits no tenderness.   Abdominal: Abdomen is soft. There is no abdominal tenderness.   Genitourinary:    Penis normal.      Genitourinary Comments: Circumcised penis with no drainage      Musculoskeletal:         General: Normal range of motion.      Cervical back: Normal range of motion and neck supple.      Lumbar back: Normal. No tenderness. Normal range of motion.     Neurological: He is alert and oriented to person, place, and time. He has normal strength. No cranial nerve deficit or sensory deficit.   Skin: Skin is warm and dry.   Psychiatric: He has a normal mood and affect.       ED Course   Procedures  Labs Reviewed   CHLAMYDIA/GONORRHOEAE(GC), PCR   URINALYSIS, REFLEX TO URINE CULTURE          Imaging Results    None          Medications   cefTRIAXone injection 0.5 g (0.5 g Intramuscular Given 7/25/23 0700)     Medical Decision Making:   History:   Old Medical Records: I decided to obtain old medical records.  Old Records Summarized: records from previous admission(s).       <> Summary of Records: Numerous visits for similar  Initial Assessment:   See hpi  Clinical Tests:   Lab Tests: Ordered and Reviewed  The following lab test(s) were unremarkable: Urinalysis        Scribe Attestation:   Scribe #1: I performed the above scribed service and the documentation accurately describes the services I performed. I attest to the accuracy of the note.  Comments: Attending:   Physician Attestation Statement for Scribe #1: I, Mckenzie Merrill MD, personally performed the services described in this documentation. All medical record entries made by the scribe were at my direction and in my presence.  I have reviewed the chart and agree that the record reflects my personal performance and is accurate and complete.        Attending Attestation:           Physician Attestation for Scribe:  Physician Attestation Statement  for Scribe #1: I, reviewed documentation, as scribed by Michael Puga in my presence, and it is both accurate and complete.         Medical Decision Making  Differential diagnoses include, but are not limited to: STD, dehydration, UTI  Gc/cz ordered along with ua  Patient wanting sti treatment  Cannot state his reported allergy to pcn. Given little cross reactivity, given im dose and obs prior to dc  Needed to leave priort o all results to go to work  Instructed that his previous ua was ok other than being concentrated and that since his symptoms improve with drinking water that he should try to hydrate more, nsaids could be used for any uncontrolled symptoms. He voiecd understanding and is comfortable wit plan  Also rx for doxycycilne given recommendations for std prophylaxis    Problems Addressed:  Dysuria: acute illness or injury that poses a threat to life or bodily functions  STD exposure: self-limited or minor problem    Amount and/or Complexity of Data Reviewed  External Data Reviewed: labs and notes.  Labs: ordered.    Risk  OTC drugs.  Prescription drug management.                         Clinical Impression:   Final diagnoses:  [Z20.2] STD exposure (Primary)  [R30.0] Dysuria        ED Disposition Condition    Discharge Stable          ED Prescriptions       Medication Sig Dispense Start Date End Date Auth. Provider    doxycycline (VIBRA-TABS) 100 MG tablet Take 1 tablet (100 mg total) by mouth 2 (two) times daily. for 7 days 14 tablet 7/25/2023 8/1/2023 Mckenzie Merrill MD          Follow-up Information       Follow up With Specialties Details Why Contact Info    primary care doctor  Schedule an appointment as soon as possible for a visit       [facility] - Emergency Dept Emergency Medicine  As needed, If symptoms worsen [address] [facility] [id number] [phone]             Mckenzie Merrill MD  07/25/23 6319

## 2023-07-27 LAB — BACTERIA UR CULT: NO GROWTH

## 2023-08-01 ENCOUNTER — HOSPITAL ENCOUNTER (EMERGENCY)
Facility: HOSPITAL | Age: 28
Discharge: HOME | End: 2023-08-01
Attending: EMERGENCY MEDICINE
Payer: COMMERCIAL

## 2023-08-01 VITALS
WEIGHT: 126 LBS | BODY MASS INDEX: 19.78 KG/M2 | TEMPERATURE: 98 F | OXYGEN SATURATION: 100 % | RESPIRATION RATE: 18 BRPM | HEART RATE: 73 BPM | DIASTOLIC BLOOD PRESSURE: 89 MMHG | HEIGHT: 67 IN | SYSTOLIC BLOOD PRESSURE: 125 MMHG

## 2023-08-01 DIAGNOSIS — K92.2 GASTROINTESTINAL HEMORRHAGE, UNSPECIFIED GASTROINTESTINAL HEMORRHAGE TYPE: Primary | ICD-10-CM

## 2023-08-01 DIAGNOSIS — R30.0 DYSURIA: ICD-10-CM

## 2023-08-01 LAB
ALBUMIN SERPL-MCNC: 4.9 G/DL (ref 3.5–5)
ALBUMIN/GLOB SERPL: 1.5 RATIO (ref 1.1–2)
ALP SERPL-CCNC: 40 UNIT/L (ref 40–150)
ALT SERPL-CCNC: 14 UNIT/L (ref 0–55)
APPEARANCE UR: CLEAR
AST SERPL-CCNC: 21 UNIT/L (ref 5–34)
BASOPHILS # BLD AUTO: 0.01 X10(3)/MCL
BASOPHILS NFR BLD AUTO: 0.2 %
BILIRUB UR QL STRIP.AUTO: NEGATIVE
BILIRUBIN DIRECT+TOT PNL SERPL-MCNC: 0.5 MG/DL
BUN SERPL-MCNC: 9.4 MG/DL (ref 8.9–20.6)
CALCIUM SERPL-MCNC: 10.2 MG/DL (ref 8.4–10.2)
CHLORIDE SERPL-SCNC: 101 MMOL/L (ref 98–107)
CO2 SERPL-SCNC: 27 MMOL/L (ref 22–29)
COLOR UR: YELLOW
CREAT SERPL-MCNC: 0.88 MG/DL (ref 0.73–1.18)
EOSINOPHIL # BLD AUTO: 0.01 X10(3)/MCL (ref 0–0.9)
EOSINOPHIL NFR BLD AUTO: 0.2 %
ERYTHROCYTE [DISTWIDTH] IN BLOOD BY AUTOMATED COUNT: 12.1 % (ref 11.5–17)
GFR SERPLBLD CREATININE-BSD FMLA CKD-EPI: >60 MLS/MIN/1.73/M2
GLOBULIN SER-MCNC: 3.2 GM/DL (ref 2.4–3.5)
GLUCOSE SERPL-MCNC: 81 MG/DL (ref 74–100)
GLUCOSE UR QL STRIP.AUTO: NEGATIVE
HCT VFR BLD AUTO: 45.6 % (ref 42–52)
HGB BLD-MCNC: 15.2 G/DL (ref 14–18)
IMM GRANULOCYTES # BLD AUTO: 0.01 X10(3)/MCL (ref 0–0.04)
IMM GRANULOCYTES NFR BLD AUTO: 0.2 %
KETONES UR QL STRIP.AUTO: NEGATIVE
LEUKOCYTE ESTERASE UR QL STRIP.AUTO: NEGATIVE
LYMPHOCYTES # BLD AUTO: 1.36 X10(3)/MCL (ref 0.6–4.6)
LYMPHOCYTES NFR BLD AUTO: 29 %
MCH RBC QN AUTO: 29 PG (ref 27–31)
MCHC RBC AUTO-ENTMCNC: 33.3 G/DL (ref 33–36)
MCV RBC AUTO: 87 FL (ref 80–94)
MONOCYTES # BLD AUTO: 0.32 X10(3)/MCL (ref 0.1–1.3)
MONOCYTES NFR BLD AUTO: 6.8 %
NEUTROPHILS # BLD AUTO: 2.98 X10(3)/MCL (ref 2.1–9.2)
NEUTROPHILS NFR BLD AUTO: 63.6 %
NITRITE UR QL STRIP.AUTO: NEGATIVE
NRBC BLD AUTO-RTO: 0 %
PH UR STRIP.AUTO: 7.5 [PH]
PLATELET # BLD AUTO: 331 X10(3)/MCL (ref 130–400)
PMV BLD AUTO: 9.2 FL (ref 7.4–10.4)
POTASSIUM SERPL-SCNC: 4.3 MMOL/L (ref 3.5–5.1)
PROT SERPL-MCNC: 8.1 GM/DL (ref 6.4–8.3)
PROT UR QL STRIP.AUTO: NEGATIVE
RBC # BLD AUTO: 5.24 X10(6)/MCL (ref 4.7–6.1)
RBC UR QL AUTO: NEGATIVE
SODIUM SERPL-SCNC: 138 MMOL/L (ref 136–145)
SP GR UR STRIP.AUTO: 1.01
UROBILINOGEN UR STRIP-ACNC: 0.2
WBC # SPEC AUTO: 4.69 X10(3)/MCL (ref 4.5–11.5)

## 2023-08-01 PROCEDURE — 80053 COMPREHEN METABOLIC PANEL: CPT | Performed by: EMERGENCY MEDICINE

## 2023-08-01 PROCEDURE — 81003 URINALYSIS AUTO W/O SCOPE: CPT | Performed by: NURSE PRACTITIONER

## 2023-08-01 PROCEDURE — 99285 EMERGENCY DEPT VISIT HI MDM: CPT | Mod: 25

## 2023-08-01 PROCEDURE — 25500020 PHARM REV CODE 255: Performed by: EMERGENCY MEDICINE

## 2023-08-01 PROCEDURE — 85025 COMPLETE CBC W/AUTO DIFF WBC: CPT | Performed by: EMERGENCY MEDICINE

## 2023-08-01 RX ADMIN — IOPAMIDOL 100 ML: 755 INJECTION, SOLUTION INTRAVENOUS at 03:08

## 2023-08-01 NOTE — Clinical Note
"Jun Vasquez" Reji was seen and treated in our emergency department on 8/1/2023.  He may return to work on 08/02/2023.       If you have any questions or concerns, please don't hesitate to call.      Coby OCHOA    "

## 2023-08-01 NOTE — ED PROVIDER NOTES
Encounter Date: 2023       History     Chief Complaint   Patient presents with    Rectal Bleeding     Pt to er c/o recurrent rectal bleeding and painful urination.     27-year-old male states he noted blood in his stool a few months ago, but he states it stopped.  Two days ago he noted blood in his stool again and states he took a picture of it with his phone camera.  He states he occasionally has pain in bilateral flanks but has had no trauma, no rectal penetration, no abdominal pain fevers chills sweats cough cold rhinorrhea sore throat nausea vomiting or diarrhea.  He states when he does not drink water he has dysuria and has had 4 urinalyses performed all of which were negative.  He states he went to the health unit and had his penis checked and was found to have no STD.  He states he has not had sexual intercourse and does not have a penile discharge.  He states the burning when he urinates is identical to that which she has had intermittently for years accepted this time drinking water is not resolving the problem.  He states his dad  of colon cancer he thinks, but he is not sure.  He does not know if he has a family history of colon cancer or not.  He is not losing weight.      Review of patient's allergies indicates:   Allergen Reactions    Penicillins Other (See Comments)     Tolerated rocephin     No past medical history on file.  Past Surgical History:   Procedure Laterality Date    axillary gland       No family history on file.  Social History     Tobacco Use    Smoking status: Never    Smokeless tobacco: Never   Substance Use Topics    Alcohol use: Not Currently     Comment: socially     Review of Systems   All other systems reviewed and are negative.      Physical Exam     Initial Vitals [23 1247]   BP Pulse Resp Temp SpO2   (!) 133/93 72 18 97.7 °F (36.5 °C) 100 %      MAP       --         Physical Exam    Nursing note and vitals reviewed.  Constitutional: He appears well-developed.    HENT:   Head: Normocephalic.   Eyes: Conjunctivae are normal.   Cardiovascular:  Normal rate, regular rhythm and normal heart sounds.           Pulmonary/Chest: Breath sounds normal.   Abdominal: Abdomen is soft. Bowel sounds are normal. He exhibits no distension and no mass. There is no abdominal tenderness. There is no rebound and no guarding.   Musculoskeletal:         General: No edema.     Lymphadenopathy:     He has no cervical adenopathy.   Neurological: He is alert.   Skin: Skin is warm.   Psychiatric: He has a normal mood and affect.         ED Course   Procedures  Labs Reviewed   URINALYSIS, REFLEX TO URINE CULTURE   CBC W/ AUTO DIFFERENTIAL    Narrative:     The following orders were created for panel order CBC auto differential.  Procedure                               Abnormality         Status                     ---------                               -----------         ------                     CBC with Differential[[id number]]                            Final result                 Please view results for these tests on the individual orders.   COMPREHENSIVE METABOLIC PANEL   CBC WITH DIFFERENTIAL          Imaging Results              CT Abdomen Pelvis With Contrast (Final result)  Result time 08/01/23 15:19:32      Final result by Demetrius Goins MD (08/01/23 15:19:32)                   Impression:      No acute abdominopelvic findings.      Electronically signed by: Demetrius Goins  Date:    08/01/2023  Time:    15:19               Narrative:    EXAMINATION:  CT ABDOMEN PELVIS WITH CONTRAST    CLINICAL HISTORY:  Abdominal pain, acute, nonlocalized;    TECHNIQUE:  Helical acquisition through the abdomen and pelvis with IV contrast.  Three plane reconstructions were provided for review.  mGycm. Automatic exposure control, adjustment of mA/kV or iterative reconstruction technique was used to reduce radiation.    COMPARISON:  No prior CT    FINDINGS:  The limited imaged lung bases are  clear.    No significant abnormality of the liver, gallbladder, spleen, pancreas or adrenals.  There is no hydronephrosis or suspicious renal lesion.    No bowel obstruction. No significant inflammatory changes of the bowel.    Urinary bladder is unremarkable. No free fluid. Aorta normal in caliber.    No significant abnormality of the osseous structures.                                       Medications   iopamidoL (ISOVUE-370) injection 100 mL (100 mLs Intravenous Given 8/1/23 1508)     Medical Decision Making:   Differential Diagnosis:   Hemorrhoids, rectal tear, diverticulosis, neoplasm, trauma, peptic ulcer disease, anemia, UTI  Clinical Tests:   Lab Tests: Ordered  Radiological Study: Ordered             ED Course as of 08/01/23 1539   Tue Aug 01, 2023   1522 Sodium: 138 [RL]   1522 Potassium: 4.3 [RL]   1522 Chloride: 101 [RL]   1522 CO2: 27 [RL]   1522 Glucose: 81 [RL]   1522 BUN: 9.4 [RL]   1522 Creatinine: 0.88 [RL]   1522 WBC: 4.69 [RL]   1522 Hemoglobin: 15.2 [RL]   1522 Hematocrit: 45.6 [RL]   1522 Platelets: 331 [RL]   1522 Color, UA: Yellow [RL]   1522 Appearance, UA: Clear [RL]   1522 Specific Gravity,UA: 1.015 [RL]   1522 Protein, UA: Negative [RL]      ED Course User Index  [RL] Calin Ojeda Jr., MD                 Clinical Impression:   Final diagnoses:  [K92.2] Gastrointestinal hemorrhage, unspecified gastrointestinal hemorrhage type (Primary)  [R30.0] Dysuria        ED Disposition Condition    Discharge Stable          ED Prescriptions    None       Follow-up Information    None          Calin Ojeda Jr., MD  08/01/23 1539

## 2023-08-01 NOTE — Clinical Note
"Jun Vasquez" Reji was seen and treated in our emergency department on 8/1/2023.  He may return to work on 08/02/2023.       If you have any questions or concerns, please don't hesitate to call.      Coby     "

## 2023-08-01 NOTE — DISCHARGE INSTRUCTIONS
See a gastroenterologist as discussed in great detail.  CAT scans do not rule out life threatening causes of blood in stool.  You must see a gastroenterologist

## 2023-09-10 ENCOUNTER — HOSPITAL ENCOUNTER (EMERGENCY)
Facility: HOSPITAL | Age: 28
Discharge: HOME | End: 2023-09-10
Attending: STUDENT IN AN ORGANIZED HEALTH CARE EDUCATION/TRAINING PROGRAM
Payer: COMMERCIAL

## 2023-09-10 VITALS
HEART RATE: 78 BPM | DIASTOLIC BLOOD PRESSURE: 81 MMHG | BODY MASS INDEX: 20.09 KG/M2 | WEIGHT: 128 LBS | RESPIRATION RATE: 17 BRPM | HEIGHT: 67 IN | TEMPERATURE: 98 F | SYSTOLIC BLOOD PRESSURE: 142 MMHG | OXYGEN SATURATION: 98 %

## 2023-09-10 DIAGNOSIS — B35.3 TINEA PEDIS, UNSPECIFIED LATERALITY: ICD-10-CM

## 2023-09-10 DIAGNOSIS — R21 RASH: Primary | ICD-10-CM

## 2023-09-10 PROCEDURE — 99284 EMERGENCY DEPT VISIT MOD MDM: CPT

## 2023-09-10 RX ORDER — PRENATAL VIT 91/IRON/FOLIC/DHA 28-975-200
COMBINATION PACKAGE (EA) ORAL 2 TIMES DAILY
Qty: 15 G | Refills: 0 | Status: SHIPPED | OUTPATIENT
Start: 2023-09-10

## 2023-09-10 RX ORDER — KETOCONAZOLE 20 MG/ML
SHAMPOO, SUSPENSION TOPICAL
Qty: 120 ML | Refills: 0 | Status: SHIPPED | OUTPATIENT
Start: 2023-09-11

## 2023-09-10 NOTE — ED PROVIDER NOTES
Encounter Date: 9/10/2023       History     Chief Complaint   Patient presents with    Rash     C/o rash to chest/back since Aug. 13. Unknown cause. Drives trucks.      27-year-old male presents to ED for evaluation of rash to his chest and back.  Patient states the rash started after taking a shower at a truck stop as he was a .  Denies any erythema or drainage.  Patient also complains of white itching in between his toes.  Denies any trauma or injury    The history is provided by the patient.     Review of patient's allergies indicates:   Allergen Reactions    Penicillins Other (See Comments)     Tolerated rocephin  unknown     History reviewed. No pertinent past medical history.  Past Surgical History:   Procedure Laterality Date    axillary gland       No family history on file.  Social History     Tobacco Use    Smoking status: Never    Smokeless tobacco: Never   Substance Use Topics    Alcohol use: Not Currently     Comment: socially     Review of Systems   Constitutional:  Negative for chills and fever.   Respiratory:  Negative for shortness of breath.    Cardiovascular:  Negative for chest pain.   Gastrointestinal:  Negative for abdominal pain, nausea and vomiting.   Genitourinary:  Negative for dysuria.   Musculoskeletal:  Negative for back pain.   Skin:  Positive for rash. Negative for wound.   Neurological:  Negative for weakness.   Hematological:  Does not bruise/bleed easily.       Physical Exam     Initial Vitals [09/10/23 1357]   BP Pulse Resp Temp SpO2   (!) 142/81 78 17 98.1 °F (36.7 °C) 98 %      MAP       --         Physical Exam    Nursing note and vitals reviewed.  Constitutional: He appears well-developed and well-nourished. He is cooperative.   HENT:   Head: Normocephalic and atraumatic.   Right Ear: Tympanic membrane and external ear normal.   Left Ear: Tympanic membrane and external ear normal.   Mouth/Throat: Uvula is midline, oropharynx is clear and moist and mucous membranes  are normal. No trismus in the jaw. No uvula swelling.   Eyes: Conjunctivae are normal. Pupils are equal, round, and reactive to light.   Neck: Neck supple.   Normal range of motion.  Cardiovascular:  Normal rate, regular rhythm and normal heart sounds.           Pulmonary/Chest: Breath sounds normal. No respiratory distress. He has no wheezes. He has no rhonchi. He has no rales.   Abdominal: Abdomen is soft. Bowel sounds are normal. There is no abdominal tenderness. There is no rebound and no guarding.   Musculoskeletal:         General: Normal range of motion.      Cervical back: Normal range of motion and neck supple.     Neurological: He is alert and oriented to person, place, and time. He has normal strength. No cranial nerve deficit or sensory deficit. GCS score is 15. GCS eye subscore is 4. GCS verbal subscore is 5. GCS motor subscore is 6.   Skin: Skin is warm and dry. Capillary refill takes less than 2 seconds.   Psychiatric: He has a normal mood and affect.         ED Course   Procedures  Labs Reviewed - No data to display       Imaging Results    None          Medications - No data to display  Medical Decision Making  27-year-old male presents to ED for evaluation of rash to his chest and back.  Patient states the rash started after taking a shower at a truck stop as he was a .  Denies any erythema or drainage.  Patient also complains of white itching in between his toes.  Denies any trauma or injury    Amount and/or Complexity of Data Reviewed  Discussion of management or test interpretation with external provider(s): Patient with rash consistent with fungal infection.  Will treat topically.  Discussed follow-up PCP for further evaluation if does not clear.  Patient verbalizes understanding and agrees with plan.    Risk  OTC drugs.  Prescription drug management.                               Clinical Impression:   Final diagnoses:  [R21] Rash (Primary)  [B35.3] Tinea pedis, unspecified  laterality        ED Disposition Condition    Discharge Stable          ED Prescriptions       Medication Sig Dispense Start Date End Date Auth. Provider    ketoconazole (NIZORAL) 2 % shampoo Apply topically twice a week. 120 mL 9/11/2023 -- Shey Wynn PA    terbinafine HCL (LAMISIL) 1 % cream Apply topically 2 (two) times daily. 15 g 9/10/2023 -- Shey Wynn PA          Follow-up Information       Follow up With Specialties Details Why Contact Info    PCP  In 1 week As needed, If you do not have a PCP you may call [phone] to help get set up If you do not have a PCP you may call [phone] to help get set up.             Shey Wynn PA  09/10/23 1420

## 2023-09-10 NOTE — DISCHARGE INSTRUCTIONS
Apply topical shampoo twice a week for rash.  Use Lamisil twice a day in between toes for fungal infection.  Make sure to keep area clean and dry.

## 2023-09-18 ENCOUNTER — OFFICE VISIT (OUTPATIENT)
Dept: UROLOGY | Facility: CLINIC | Age: 28
End: 2023-09-18
Payer: COMMERCIAL

## 2023-09-18 VITALS
HEIGHT: 67 IN | SYSTOLIC BLOOD PRESSURE: 131 MMHG | TEMPERATURE: 98 F | DIASTOLIC BLOOD PRESSURE: 73 MMHG | WEIGHT: 127 LBS | BODY MASS INDEX: 19.93 KG/M2 | HEART RATE: 75 BPM | OXYGEN SATURATION: 97 %

## 2023-09-18 DIAGNOSIS — K92.1 BLOODY STOOLS: Primary | ICD-10-CM

## 2023-09-18 DIAGNOSIS — R30.0 DYSURIA: ICD-10-CM

## 2023-09-18 LAB
BILIRUB SERPL-MCNC: NORMAL MG/DL
BLOOD URINE, POC: NORMAL
COLOR, POC UA: YELLOW
GLUCOSE UR QL STRIP: NORMAL
KETONES UR QL STRIP: NORMAL
LEUKOCYTE ESTERASE URINE, POC: NORMAL
NITRITE, POC UA: NORMAL
PH, POC UA: 6
PROTEIN, POC: NORMAL
SPECIFIC GRAVITY, POC UA: 1.02
UROBILINOGEN, POC UA: 0.2

## 2023-09-18 PROCEDURE — 3075F SYST BP GE 130 - 139MM HG: CPT | Mod: CPTII,,, | Performed by: NURSE PRACTITIONER

## 2023-09-18 PROCEDURE — 1159F PR MEDICATION LIST DOCUMENTED IN MEDICAL RECORD: ICD-10-PCS | Mod: CPTII,,, | Performed by: NURSE PRACTITIONER

## 2023-09-18 PROCEDURE — 3078F PR MOST RECENT DIASTOLIC BLOOD PRESSURE < 80 MM HG: ICD-10-PCS | Mod: CPTII,,, | Performed by: NURSE PRACTITIONER

## 2023-09-18 PROCEDURE — 3078F DIAST BP <80 MM HG: CPT | Mod: CPTII,,, | Performed by: NURSE PRACTITIONER

## 2023-09-18 PROCEDURE — 3075F PR MOST RECENT SYSTOLIC BLOOD PRESS GE 130-139MM HG: ICD-10-PCS | Mod: CPTII,,, | Performed by: NURSE PRACTITIONER

## 2023-09-18 PROCEDURE — 3008F BODY MASS INDEX DOCD: CPT | Mod: CPTII,,, | Performed by: NURSE PRACTITIONER

## 2023-09-18 PROCEDURE — 81001 URINALYSIS AUTO W/SCOPE: CPT | Mod: PBBFAC | Performed by: NURSE PRACTITIONER

## 2023-09-18 PROCEDURE — 99204 PR OFFICE/OUTPT VISIT, NEW, LEVL IV, 45-59 MIN: ICD-10-PCS | Mod: S$PBB,,, | Performed by: NURSE PRACTITIONER

## 2023-09-18 PROCEDURE — 1159F MED LIST DOCD IN RCRD: CPT | Mod: CPTII,,, | Performed by: NURSE PRACTITIONER

## 2023-09-18 PROCEDURE — 99204 OFFICE O/P NEW MOD 45 MIN: CPT | Mod: S$PBB,,, | Performed by: NURSE PRACTITIONER

## 2023-09-18 PROCEDURE — 99215 OFFICE O/P EST HI 40 MIN: CPT | Mod: PBBFAC | Performed by: NURSE PRACTITIONER

## 2023-09-18 PROCEDURE — 3008F PR BODY MASS INDEX (BMI) DOCUMENTED: ICD-10-PCS | Mod: CPTII,,, | Performed by: NURSE PRACTITIONER

## 2023-09-18 NOTE — PROGRESS NOTES
Patient seen by JOSE Trevino Np will return in 6 months. Written and verbal discharge instructions given

## 2023-09-18 NOTE — PROGRESS NOTES
"Chief Complaint:   Chief Complaint   Patient presents with    Dysuria     Referred for dysuria, states it has gotten better. Has increased water intake        HPI:  Patient is a 27-year-old male referred to Urology for recurrent dysuria.  Patient seen in emergency room on 2023 with this statement "He states when he does not drink water he has dysuria and has had 4 urinalyses performed all of which were negative.  He states he went to the health unit and had his penis checked and was found to have no STD.  He states he has not had sexual intercourse and does not have a penile discharge.  He states the burning when he urinates is identical to that which she has had intermittently for years accepted this time drinking water is not resolving the problem".  Today patient presents without any urologic complaints of dysuria, urinary frequency, urinary urgency, urinary incontinence, urinary hesitancy, urinary retention, gross hematuria.  His dysuria has dissipated since discontinuing sodas and increasing water intake.  Patient does complain of intermittent rectal bleeding, patient will be referred to GI due to father had a positive history of colon cancer and  at age 61 due to lack of treatment for rectal bleeding for several years without a colonoscopy.    Allergies:  Review of patient's allergies indicates:   Allergen Reactions    Penicillins Other (See Comments)     Tolerated rocephin  unknown       Medications:  Current Outpatient Medications   Medication Sig Dispense Refill    ketoconazole (NIZORAL) 2 % shampoo Apply topically twice a week. (Patient not taking: Reported on 2023) 120 mL 0    terbinafine HCL (LAMISIL) 1 % cream Apply topically 2 (two) times daily. (Patient not taking: Reported on 2023) 15 g 0     No current facility-administered medications for this visit.       Review of Systems:  General: No fever, chills, fatigability, or weight loss.  Skin: No rashes, itching, or changes in color " or texture of skin.  Chest: Denies BYRD, cyanosis, wheezing, cough, and sputum production.  Abdomen: Appetite fine. No weight loss. Denies diarrhea, abdominal pain, hematemesis, or blood in stool.  Musculoskeletal: No joint stiffness or swelling. Denies back pain.  : As above.  All other review of systems negative.    PMH:  History reviewed. No pertinent past medical history.    PSH:  Past Surgical History:   Procedure Laterality Date    axillary gland         FamHx:  Family History   Problem Relation Age of Onset    Colon cancer Father     Cancer Maternal Grandmother        SocHx:  Social History     Socioeconomic History    Marital status: Single   Tobacco Use    Smoking status: Never    Smokeless tobacco: Never   Substance and Sexual Activity    Alcohol use: Yes     Comment: socially    Drug use: Never    Sexual activity: Yes     Partners: Female     Birth control/protection: None     Social Determinants of Health     Food Insecurity: No Food Insecurity (3/30/2023)    Hunger Vital Sign     Worried About Running Out of Food in the Last Year: Never true     Ran Out of Food in the Last Year: Never true   Transportation Needs: No Transportation Needs (3/30/2023)    PRAPARE - Transportation     Lack of Transportation (Medical): No     Lack of Transportation (Non-Medical): No   Housing Stability: Low Risk  (3/30/2023)    Housing Stability Vital Sign     Unable to Pay for Housing in the Last Year: No     Number of Places Lived in the Last Year: 1     Unstable Housing in the Last Year: No       Physical Exam:  Vitals:    09/18/23 0955   BP: 131/73   Pulse: 75   Temp: 97.9 °F (36.6 °C)     General: A&Ox3, no apparent distress, no deformities  Neck: No masses, normal thyroid  Lungs: CTA valentin, no use of accessory muscles  Heart: RRR, no arrhythmias  Abdomen: Soft, NT, ND, no masses, no hernias, no hepatosplenomegaly  Lymphatic: Neck and groin nodes negative  Skin: The skin is warm and dry. No jaundice.  Ext: No  c/c/e.    GUMale: Test desc valentin, no abnormalities of epididymus. Penis circumcised, with normal penile and scrotal skin. Meatus normal. Normal rectal tone, unable to palpate  hemorrhoids due to patient discomfort. Prostate:  Unable to palpate patient's prostate due to discomfort.    Urinalysis:  Results for orders placed or performed in visit on 09/18/23   POCT URINE DIPSTICK WITH MICROSCOPE, AUTOMATED   Result Value Ref Range    Color, UA Yellow     Spec Grav UA 1.025     pH, UA 6.0     WBC, UA neg     Nitrite, UA neg     Protein, POC neg     Glucose, UA neg     Ketones, UA neg     Urobilinogen, UA 0.2     Bilirubin, POC neg     Blood, UA neg    Microscopic urinalysis negative for RBCs, WBCs, nitrites.          Impression:  1. Dysuria  - Ambulatory referral/consult to Urology  - POCT URINE DIPSTICK WITH MICROSCOPE, AUTOMATED  2. Bloody stools    Plan:  Instructed patient to notify clinic if any dysuria recurs continue drinking water and discontinue sodas.  Instructed patient he will be referred to Gastroenterology for GI bleed dark stools and bright red intermittently.  RTC 6 months.  If patient develops any symptoms after hours go to emergency room.

## 2023-11-02 ENCOUNTER — HOSPITAL ENCOUNTER (EMERGENCY)
Facility: HOSPITAL | Age: 28
Discharge: HOME | End: 2023-11-02
Attending: INTERNAL MEDICINE
Payer: COMMERCIAL

## 2023-11-02 VITALS
RESPIRATION RATE: 18 BRPM | SYSTOLIC BLOOD PRESSURE: 130 MMHG | DIASTOLIC BLOOD PRESSURE: 95 MMHG | HEART RATE: 94 BPM | TEMPERATURE: 98 F | OXYGEN SATURATION: 98 %

## 2023-11-02 DIAGNOSIS — Z20.2 EXPOSURE TO SEXUALLY TRANSMITTED DISEASE (STD): Primary | ICD-10-CM

## 2023-11-02 LAB
APPEARANCE UR: CLEAR
BILIRUB UR QL STRIP.AUTO: NEGATIVE
COLOR UR AUTO: YELLOW
GLUCOSE UR QL STRIP.AUTO: NEGATIVE
KETONES UR QL STRIP.AUTO: NEGATIVE
LEUKOCYTE ESTERASE UR QL STRIP.AUTO: NEGATIVE
NITRITE UR QL STRIP.AUTO: NEGATIVE
PH UR STRIP.AUTO: 6 [PH]
PROT UR QL STRIP.AUTO: NEGATIVE
RBC UR QL AUTO: NEGATIVE
SP GR UR STRIP.AUTO: 1.02 (ref 1–1.03)
UROBILINOGEN UR STRIP-ACNC: 1

## 2023-11-02 PROCEDURE — 87491 CHLMYD TRACH DNA AMP PROBE: CPT | Performed by: INTERNAL MEDICINE

## 2023-11-02 PROCEDURE — 25000003 PHARM REV CODE 250: Performed by: INTERNAL MEDICINE

## 2023-11-02 PROCEDURE — 81003 URINALYSIS AUTO W/O SCOPE: CPT | Performed by: INTERNAL MEDICINE

## 2023-11-02 PROCEDURE — 99284 EMERGENCY DEPT VISIT MOD MDM: CPT

## 2023-11-02 PROCEDURE — 63600175 PHARM REV CODE 636 W HCPCS: Performed by: INTERNAL MEDICINE

## 2023-11-02 PROCEDURE — 96372 THER/PROPH/DIAG INJ SC/IM: CPT | Performed by: INTERNAL MEDICINE

## 2023-11-02 PROCEDURE — 87591 N.GONORRHOEAE DNA AMP PROB: CPT | Performed by: INTERNAL MEDICINE

## 2023-11-02 RX ORDER — CEFTRIAXONE 1 G/1
500 INJECTION, POWDER, FOR SOLUTION INTRAMUSCULAR; INTRAVENOUS ONCE
Status: COMPLETED | OUTPATIENT
Start: 2023-11-02 | End: 2023-11-02

## 2023-11-02 RX ORDER — DOXYCYCLINE 100 MG/1
100 CAPSULE ORAL EVERY 12 HOURS
Qty: 14 CAPSULE | Refills: 0 | Status: SHIPPED | OUTPATIENT
Start: 2023-11-02 | End: 2023-11-09

## 2023-11-02 RX ORDER — LIDOCAINE HYDROCHLORIDE 10 MG/ML
1 INJECTION INFILTRATION; PERINEURAL ONCE
Status: COMPLETED | OUTPATIENT
Start: 2023-11-02 | End: 2023-11-02

## 2023-11-02 RX ADMIN — CEFTRIAXONE SODIUM 500 MG: 1 INJECTION, POWDER, FOR SOLUTION INTRAMUSCULAR; INTRAVENOUS at 10:11

## 2023-11-02 RX ADMIN — LIDOCAINE HYDROCHLORIDE 1 ML: 10 INJECTION, SOLUTION INFILTRATION; PERINEURAL at 10:11

## 2023-11-03 NOTE — ED PROVIDER NOTES
Source of History:  Patient, no limitations    Chief complaint:  Exposure to STD      HPI:  Jun Mendoza is a 27 y.o. male presenting with Exposure to STD       Pt states he was told by a partner that she had gonorrhea. Pt denies symptoms.  Patient complains of  known gonorrhea exposure  He has had symptoms for 1 week. Patient also complains of  no symptoms . Patient denies fever and discharge . Patient does have a history of STDs        Review of Systems   Constitutional symptoms:  Negative except as documented in HPI.   Skin symptoms:  Negative except as documented in HPI.   HEENT symptoms:  Negative except as documented in HPI.   Respiratory symptoms:  Negative except as documented in HPI.   Cardiovascular symptoms:  Negative except as documented in HPI.   Gastrointestinal symptoms:  Negative except as documented in HPI.    Genitourinary symptoms:  Negative except as documented in HPI.   Musculoskeletal symptoms:  Negative except as documented in HPI.   Neurologic symptoms:  Negative except as documented in HPI.   Psychiatric symptoms:  Negative except as documented in HPI.   Allergy/immunologic symptoms:  Negative except as documented in HPI.             Additional review of systems information: All other systems reviewed and otherwise negative.      Review of patient's allergies indicates:   Allergen Reactions    Penicillins Other (See Comments)     Tolerated rocephin  unknown       PMH:  As per HPI and below:    History reviewed. No pertinent past medical history.     Family History   Problem Relation Age of Onset    Colon cancer Father     Cancer Maternal Grandmother        Past Surgical History:   Procedure Laterality Date    axillary gland         Social History     Tobacco Use    Smoking status: Never    Smokeless tobacco: Never   Substance Use Topics    Alcohol use: Not Currently     Comment: socially    Drug use: Never       Patient Active Problem List   Diagnosis    Bloody stools    Dysuria         Physical Exam:    BP (!) 130/95 (Patient Position: Sitting)   Pulse 94   Temp 97.5 °F (36.4 °C) (Temporal)   Resp 18   SpO2 98%     Nursing note and vital signs reviewed.    General:  Alert, no acute distress.   Skin: Normal for Ethnic Origin, No cyanosis  HEENT: Normocephalic and atraumatic, Vision unchanged, Pupils symmetric, No icterus , Nasal mucosa is pink and moist  Cardiovascular:  Regular rate and rhythm, No edema  Chest Wall: No deformity, equal chest rise  Respiratory:  Lungs are clear to auscultation, respirations are non-labored.    Musculoskeletal:  No deformity, Normal perfusion to all extremities  Gastrointestinal:  Soft, Non distended  Neurological:  Alert and oriented, normal motor observed, normal speech observed.    Psychiatric:  Cooperative, appropriate mood & affect.        Labs that have been ordered have been independently reviewed and interpreted by myself.     Old Chart Reviewed.      Initial Impression/ Differential Dx:  STI, urethritis, testicular/appendix torsion, epididymitis, orchitis, UTI, kidney stone, urinary retention, appendicitis, prostatitis, testicular mass/neoplasm, incarcerated/strangulated hernia.      MDM:      Reviewed Nurses Note.    Reviewed Pertinent old records.    Orders Placed This Encounter    Pulse Oximeter    Chlamydia/GC, PCR    Urinalysis    cefTRIAXone injection 500 mg    LIDOcaine HCL 10 mg/ml (1%) injection 1 mL    doxycycline (MONODOX) 100 MG capsule                    Labs Reviewed   URINALYSIS - Normal   CHLAMYDIA/GONORRHOEAE(GC), PCR          No orders to display        Admission on 11/02/2023, Discharged on 11/02/2023   Component Date Value Ref Range Status    Color, UA 11/02/2023 Yellow  Yellow, Light-Yellow, Dark Yellow, Teresa, Straw Final    Appearance, UA 11/02/2023 Clear  Clear Final    Specific [place], UA 11/02/2023 1.025  1.005 - 1.030 Final    pH, UA 11/02/2023 6.0  5.0 - 8.5 Final    Protein, UA 11/02/2023 Negative  Negative Final     Glucose, UA 11/02/2023 Negative  Negative, Normal Final    Ketones, UA 11/02/2023 Negative  Negative Final    Blood, UA 11/02/2023 Negative  Negative Final    Bilirubin, UA 11/02/2023 Negative  Negative Final    Urobilinogen, UA 11/02/2023 1.0  0.2, 1.0, Normal Final    Nitrites, UA 11/02/2023 Negative  Negative Final    Leukocyte Esterase, UA 11/02/2023 Negative  Negative Final       Imaging Results    None                                              Diagnostic Impression:    1. Exposure to sexually transmitted disease (STD)         ED Disposition Condition    Discharge Stable             Follow-up Information       [facility] - Emergency Dept.    Specialty: Emergency Medicine  Why: If symptoms worsen  Contact information:  [address]  [facility] [id number] [phone]                            ED Prescriptions       Medication Sig Dispense Start Date End Date Auth. Provider    doxycycline (MONODOX) 100 MG capsule Take 1 capsule (100 mg total) by mouth every 12 (twelve) hours. for 7 days 14 capsule 11/2/2023 11/9/2023 Micah Jacobsen DO          Follow-up Information       Follow up With Specialties Details Why Contact Info    [facility] - Emergency Dept Emergency Medicine  If symptoms worsen [address]  [facility] [id number] [phone]             Micah Jacobsen DO  11/03/23 0022

## 2023-11-16 ENCOUNTER — APPOINTMENT (OUTPATIENT)
Facility: HOSPITAL | Age: 28
End: 2023-11-16

## 2023-11-16 ENCOUNTER — HOSPITAL ENCOUNTER (EMERGENCY)
Facility: HOSPITAL | Age: 28
Discharge: HOME | End: 2023-11-16
Attending: STUDENT IN AN ORGANIZED HEALTH CARE EDUCATION/TRAINING PROGRAM

## 2023-11-16 VITALS
DIASTOLIC BLOOD PRESSURE: 82 MMHG | OXYGEN SATURATION: 100 % | HEIGHT: 67 IN | RESPIRATION RATE: 16 BRPM | WEIGHT: 129.63 LBS | SYSTOLIC BLOOD PRESSURE: 109 MMHG | HEART RATE: 79 BPM | TEMPERATURE: 97.7 F | BODY MASS INDEX: 20.35 KG/M2

## 2023-11-16 DIAGNOSIS — S09.93XA FACIAL INJURY, INITIAL ENCOUNTER: Primary | ICD-10-CM

## 2023-11-16 PROCEDURE — 99284 EMERGENCY DEPT VISIT MOD MDM: CPT

## 2023-11-16 PROCEDURE — 70486 CT MAXILLOFACIAL W/O DYE: CPT

## 2023-11-16 PROCEDURE — 6370000000 HC RX 637 (ALT 250 FOR IP)

## 2023-11-16 PROCEDURE — 70450 CT HEAD/BRAIN W/O DYE: CPT

## 2023-11-16 RX ORDER — 0.9 % SODIUM CHLORIDE 0.9 %
1000 INTRAVENOUS SOLUTION INTRAVENOUS ONCE
Status: DISCONTINUED | OUTPATIENT
Start: 2023-11-16 | End: 2023-11-16 | Stop reason: HOSPADM

## 2023-11-16 RX ORDER — HYDROXYZINE HYDROCHLORIDE 25 MG/1
25 TABLET, FILM COATED ORAL ONCE
Status: DISCONTINUED | OUTPATIENT
Start: 2023-11-16 | End: 2023-11-16 | Stop reason: HOSPADM

## 2023-11-16 RX ORDER — ACETAMINOPHEN 325 MG/1
650 TABLET ORAL
Status: COMPLETED | OUTPATIENT
Start: 2023-11-16 | End: 2023-11-16

## 2023-11-16 RX ORDER — PROCHLORPERAZINE EDISYLATE 5 MG/ML
10 INJECTION INTRAMUSCULAR; INTRAVENOUS ONCE
Status: DISCONTINUED | OUTPATIENT
Start: 2023-11-16 | End: 2023-11-16 | Stop reason: HOSPADM

## 2023-11-16 RX ADMIN — ACETAMINOPHEN 650 MG: 325 TABLET ORAL at 10:46

## 2023-11-16 ASSESSMENT — PAIN - FUNCTIONAL ASSESSMENT: PAIN_FUNCTIONAL_ASSESSMENT: 0-10

## 2023-11-16 ASSESSMENT — PAIN SCALES - GENERAL: PAINLEVEL_OUTOF10: 9

## 2023-11-16 NOTE — DISCHARGE INSTRUCTIONS
You were seen in the ED in the setting of hitting your face on a trailer door. You had a head CT and CT maxillofacial to evaluate for other facial fractures. You endorsed deformity to your braces and will likely need new ones placed if they are feeling loose in your mouth. Able to take tylenol or ibuprofen as needed for headache.

## 2023-11-16 NOTE — ED PROVIDER NOTES
[facility] EMERGENCY DEPT  EMERGENCY DEPARTMENT ENCOUNTER       Pt Name: Leslie Morataya  MRN: [id number]  [address] [place] 1995  Date of evaluation: 11/16/2023  Provider: Kylah Diaz MD   PCP: No primary care provider on file. Note Started: 2:24 PM EST 11/16/23     CHIEF COMPLAINT       Chief Complaint   Patient presents with    Facial Pain     Patient hit by a door at work yesterday. Today patient with complaints of nausea and headache overnight. Patient reports a nose bleed last night. HISTORY OF PRESENT ILLNESS: 1 or more elements      History From: patient, History limited by: none     Leslie Morataya is a 33 y.o. male with no known past medical history who presents to the ED following head injury that occurred at work yesterday. Patient reports that he was opening a trailer door with difficulty when it swung open and hit him in the center of his face resulting in epistaxis. Patient denies any associated syncope with injury but states that he feels that his braces are loose and his teeth mildly feel loose as well. He denies any vision change, shortness of breath, chest pain, but endorses ongoing intermittent headache and midface pain. He states that it is painful for him to chew but denies any difficulty opening his jaw. Patient has not taking blood thinners. Please See MDM for Additional Details of the HPI/PMH  Nursing Notes were all reviewed and agreed with or any disagreements were addressed in the HPI. REVIEW OF SYSTEMS        Positives and Pertinent negatives as per HPI. PAST HISTORY     Past Medical History:  No past medical history on file. Past Surgical History:  No past surgical history on file. Family History:  No family history on file. Social History: Allergies: Allergies   Allergen Reactions    Penicillins Swelling       CURRENT MEDICATIONS    There are no discharge medications for this patient.       SCREENINGS               No data recorded         PHYSICAL EXAM

## 2023-12-16 ENCOUNTER — HOSPITAL ENCOUNTER (EMERGENCY)
Facility: HOSPITAL | Age: 28
Discharge: HOME | End: 2023-12-16
Payer: COMMERCIAL

## 2023-12-16 VITALS
OXYGEN SATURATION: 100 % | BODY MASS INDEX: 20.58 KG/M2 | RESPIRATION RATE: 18 BRPM | SYSTOLIC BLOOD PRESSURE: 129 MMHG | HEART RATE: 59 BPM | TEMPERATURE: 98.1 F | DIASTOLIC BLOOD PRESSURE: 91 MMHG | WEIGHT: 131.39 LBS

## 2023-12-16 DIAGNOSIS — Z20.2 EXPOSURE TO SEXUALLY TRANSMITTED DISEASE (STD): Primary | ICD-10-CM

## 2023-12-16 LAB
APPEARANCE UR: CLEAR
BACTERIA URNS QL MICRO: NEGATIVE /HPF
BILIRUB UR QL: NEGATIVE
COLOR UR: NORMAL
EPITH CASTS URNS QL MICRO: NORMAL /LPF
GLUCOSE UR STRIP.AUTO-MCNC: NEGATIVE MG/DL
HGB UR QL STRIP: NEGATIVE
HYALINE CASTS URNS QL MICRO: NORMAL /LPF (ref 0–2)
KETONES UR QL STRIP.AUTO: NEGATIVE MG/DL
LEUKOCYTE ESTERASE UR QL STRIP.AUTO: NEGATIVE
NITRITE UR QL STRIP.AUTO: NEGATIVE
PH UR STRIP: 6.5 (ref 5–8)
PROT UR STRIP-MCNC: NEGATIVE MG/DL
RBC #/AREA URNS HPF: NORMAL /HPF (ref 0–5)
SP GR UR REFRACTOMETRY: 1.02
URINE CULTURE IF INDICATED: NORMAL
UROBILINOGEN UR QL STRIP.AUTO: 1 EU/DL (ref 0.2–1)
WBC URNS QL MICRO: NORMAL /HPF (ref 0–4)

## 2023-12-16 PROCEDURE — 81001 URINALYSIS AUTO W/SCOPE: CPT

## 2023-12-16 PROCEDURE — 87491 CHLMYD TRACH DNA AMP PROBE: CPT

## 2023-12-16 PROCEDURE — 2500000003 HC RX 250 WO HCPCS

## 2023-12-16 PROCEDURE — 6370000000 HC RX 637 (ALT 250 FOR IP)

## 2023-12-16 PROCEDURE — 6360000002 HC RX W HCPCS

## 2023-12-16 PROCEDURE — 87591 N.GONORRHOEAE DNA AMP PROB: CPT

## 2023-12-16 RX ORDER — AZITHROMYCIN 250 MG/1
1000 TABLET, FILM COATED ORAL ONCE
Status: COMPLETED | OUTPATIENT
Start: 2023-12-16 | End: 2023-12-16

## 2023-12-16 RX ORDER — AZITHROMYCIN 250 MG/1
1000 TABLET, FILM COATED ORAL DAILY
Status: DISCONTINUED | OUTPATIENT
Start: 2023-12-17 | End: 2023-12-16

## 2023-12-16 RX ADMIN — LIDOCAINE HYDROCHLORIDE 500 MG: 10 INJECTION, SOLUTION EPIDURAL; INFILTRATION; INTRACAUDAL; PERINEURAL at 21:34

## 2023-12-16 RX ADMIN — AZITHROMYCIN 1000 MG: 250 TABLET, FILM COATED ORAL at 21:33

## 2023-12-17 LAB
C TRACH DNA SPEC QL NAA+PROBE: NEGATIVE
N GONORRHOEA DNA SPEC QL NAA+PROBE: NEGATIVE
SAMPLE TYPE: NORMAL
SERVICE CMNT-IMP: NORMAL
SPECIMEN SOURCE: NORMAL

## 2023-12-17 NOTE — ED PROVIDER NOTES
CISCO Mtz NP (electronically signed)    I have seen and evaluated the patient autonomously. My supervision physician was on site and available for consultation if needed. (Please note that parts of this dictation were completed with voice recognition software. Quite often unanticipated grammatical, syntax, homophones, and other interpretive errors are inadvertently transcribed by the computer software. Please disregards these errors.  Please excuse any errors that have escaped final proofreading.)         CISCO Mtz NP  12/16/23 2850

## 2024-01-02 ENCOUNTER — HOSPITAL ENCOUNTER (EMERGENCY)
Facility: HOSPITAL | Age: 29
Discharge: HOME | End: 2024-01-02
Attending: INTERNAL MEDICINE
Payer: MEDICAID

## 2024-01-02 VITALS
HEIGHT: 67 IN | BODY MASS INDEX: 21.19 KG/M2 | SYSTOLIC BLOOD PRESSURE: 123 MMHG | OXYGEN SATURATION: 99 % | DIASTOLIC BLOOD PRESSURE: 93 MMHG | WEIGHT: 135 LBS | RESPIRATION RATE: 20 BRPM | TEMPERATURE: 98 F | HEART RATE: 76 BPM

## 2024-01-02 DIAGNOSIS — R21 RASH: Primary | ICD-10-CM

## 2024-01-02 DIAGNOSIS — L73.9 FOLLICULITIS: ICD-10-CM

## 2024-01-02 PROCEDURE — 99284 EMERGENCY DEPT VISIT MOD MDM: CPT

## 2024-01-02 RX ORDER — MUPIROCIN 20 MG/G
OINTMENT TOPICAL 3 TIMES DAILY
Qty: 22 G | Refills: 0 | Status: SHIPPED | OUTPATIENT
Start: 2024-01-02 | End: 2024-01-09

## 2024-01-02 RX ORDER — CEPHALEXIN 500 MG/1
500 CAPSULE ORAL 4 TIMES DAILY
Qty: 28 CAPSULE | Refills: 0 | Status: SHIPPED | OUTPATIENT
Start: 2024-01-02 | End: 2024-01-09

## 2024-01-02 NOTE — ED PROVIDER NOTES
"Encounter Date: 1/2/2024       History     Chief Complaint   Patient presents with    Rash     Reports of a rash on the back of his neck starting a week ago after he used a razor to shave his neck. Denies fevers.     Patient states rash to posterior neck along his hairline x several days. States that rash began after using a razor to shave his hair. Denies any itching or drainage. States that rash is "burning." Denies any fever. Denies any PMH.    The history is provided by the patient.   Rash   This is a new problem. The current episode started several days ago. The problem has been unchanged. The rash is present on the scalp. The pain is at a severity of 2/10. The pain has been Intermittent since onset. Pertinent negatives include no blisters, no itching and no weeping. He has tried nothing for the symptoms.     Review of patient's allergies indicates:   Allergen Reactions    Penicillins Other (See Comments)     Tolerated rocephin  unknown     No past medical history on file.  Past Surgical History:   Procedure Laterality Date    axillary gland       Family History   Problem Relation Age of Onset    Colon cancer Father     Cancer Maternal Grandmother      Social History     Tobacco Use    Smoking status: Never    Smokeless tobacco: Never   Substance Use Topics    Alcohol use: Not Currently     Comment: socially    Drug use: Never     Review of Systems   Constitutional: Negative.  Negative for fever.   HENT: Negative.     Eyes: Negative.    Respiratory: Negative.     Cardiovascular: Negative.    Gastrointestinal: Negative.    Endocrine: Negative.    Genitourinary: Negative.    Musculoskeletal: Negative.    Skin:  Positive for rash. Negative for itching.   Allergic/Immunologic: Negative.    Neurological: Negative.    Hematological: Negative.    Psychiatric/Behavioral: Negative.     All other systems reviewed and are negative.      Physical Exam     Initial Vitals [01/02/24 1208]   BP Pulse Resp Temp SpO2   (!) " "123/93 76 20 98.4 °F (36.9 °C) 99 %      MAP       --         Physical Exam    Nursing note and vitals reviewed.  Constitutional: He appears well-developed and well-nourished. No distress.   HENT:   Head: Normocephalic and atraumatic.   Mouth/Throat: Oropharynx is clear and moist.   Eyes: Conjunctivae and EOM are normal. Pupils are equal, round, and reactive to light.   Neck: Neck supple.   Normal range of motion.  Cardiovascular:  Normal rate, regular rhythm, normal heart sounds and intact distal pulses.           Pulmonary/Chest: Breath sounds normal. No respiratory distress. He has no wheezes.   Abdominal: Abdomen is soft. He exhibits no distension. There is no abdominal tenderness.   Musculoskeletal:         General: No edema. Normal range of motion.      Cervical back: Normal range of motion and neck supple.     Neurological: He is alert and oriented to person, place, and time. He has normal strength. GCS score is 15. GCS eye subscore is 4. GCS verbal subscore is 5. GCS motor subscore is 6.   Skin: Skin is warm and dry. Rash (There is an inflamed papular and pustular appearing rash to the right posterior neck along the hairline.  Rash is not vesicular and without any draiange or crusting.) noted.   Psychiatric: He has a normal mood and affect. Thought content normal.         ED Course   Procedures  Labs Reviewed - No data to display       Imaging Results    None          Medications - No data to display  Medical Decision Making  Patient states rash to posterior neck along his hairline x several days. States that rash began after using a razor to shave his hair. Denies any itching or drainage. States that rash is "burning." Denies any fever. Denies any PMH.    The history is provided by the patient.   Rash   This is a new problem. The current episode started several days ago. The problem has been unchanged. The rash is present on the scalp. The pain is at a severity of 2/10. The pain has been Intermittent since " onset. Pertinent negatives include no blisters, no itching and no weeping. He has tried nothing for the symptoms.   Patient is awake, alert, afebrile, and nontoxic appearing in the ED.    Amount and/or Complexity of Data Reviewed  Discussion of management or test interpretation with external provider(s): Differential diagnosis (including but not limited to):   Judging by the patient's chief complaint and pertinent history, the patient has the following possible differential diagnoses, including but not limited to the following.  Some of these are deemed to be lower likelihood and some more likely based on my physical exam and history combined with possible lab work and/or imaging studies.   Please see the pertinent studies, and refer to the HPI.  Some of these diagnoses will take further evaluation to fully rule out, perhaps as an outpatient and the patient was encouraged to follow up when discharged for more comprehensive evaluation.  Folliculitis, Abscess, Contact Dermatitis, Rash  Will treat patient for folliculitis. ED return precautions were given.                                          Clinical Impression:  Final diagnoses:  [R21] Rash (Primary)  [L73.9] Folliculitis          ED Disposition Condition    Discharge Stable          ED Prescriptions       Medication Sig Dispense Start Date End Date Auth. Provider    mupirocin (BACTROBAN) 2 % ointment Apply topically 3 (three) times daily. for 7 days 22 g 1/2/2024 1/9/2024 Romi Nunez FNP    cephALEXin (KEFLEX) 500 MG capsule Take 1 capsule (500 mg total) by mouth 4 (four) times daily. for 7 days 28 capsule 1/2/2024 1/9/2024 Romi Nunez FNP          Follow-up Information       Follow up With Specialties Details Why Contact Info    Primary Care Provider  In 3 days               Romi Nunez FNP  01/02/24 1306

## 2024-01-28 ENCOUNTER — HOSPITAL ENCOUNTER (EMERGENCY)
Facility: HOSPITAL | Age: 29
Discharge: HOME | End: 2024-01-28
Attending: EMERGENCY MEDICINE
Payer: MEDICAID

## 2024-01-28 VITALS
BODY MASS INDEX: 21.19 KG/M2 | TEMPERATURE: 97 F | HEIGHT: 67 IN | SYSTOLIC BLOOD PRESSURE: 118 MMHG | WEIGHT: 135 LBS | DIASTOLIC BLOOD PRESSURE: 75 MMHG | OXYGEN SATURATION: 98 % | HEART RATE: 71 BPM | RESPIRATION RATE: 16 BRPM

## 2024-01-28 DIAGNOSIS — F10.10 ALCOHOL ABUSE: Primary | ICD-10-CM

## 2024-01-28 LAB
ETHANOL SERPL-MCNC: <10 MG/DL
HOLD SPECIMEN: NORMAL
HOLD SPECIMEN: NORMAL

## 2024-01-28 PROCEDURE — 82077 ASSAY SPEC XCP UR&BREATH IA: CPT | Performed by: EMERGENCY MEDICINE

## 2024-01-28 PROCEDURE — 99283 EMERGENCY DEPT VISIT LOW MDM: CPT

## 2024-01-28 NOTE — ED PROVIDER NOTES
Encounter Date: 1/28/2024       History     Chief Complaint   Patient presents with    Alcohol Problem     Requesting detox from alcohol, states he drinks 1/5 of [place] each day, x3 years, last drink was last night      Requesting alcohol rehabilitative services ; no acute psychiatric features evident ;      Drug / Alcohol Assessment  Primary symptoms include agitation, violence, and intoxication.   Primary symptoms include no confusion, no somnolence, no loss of consciousness, no seizures, no weakness, no delusions, no hallucinations, no self-injury. This is a recurrent problem. The current episode started yesterday. The problem has been unchanged. Suspected agents include alcohol. Pertinent negatives include no fever and no injury. Associated medical issues do not include addiction treatment or withdrawal syndrome.     Review of patient's allergies indicates:   Allergen Reactions    Penicillins Other (See Comments)     Tolerated rocephin  unknown     History reviewed. No pertinent past medical history.  Past Surgical History:   Procedure Laterality Date    axillary gland       Family History   Problem Relation Age of Onset    Colon cancer Father     Cancer Maternal Grandmother      Social History     Tobacco Use    Smoking status: Never    Smokeless tobacco: Never   Substance Use Topics    Alcohol use: Not Currently     Comment: socially    Drug use: Never     Review of Systems   Constitutional:  Negative for fever.   Neurological:  Negative for seizures, loss of consciousness and weakness.   Psychiatric/Behavioral:  Positive for agitation. Negative for confusion, hallucinations and self-injury.    All other systems reviewed and are negative.      Physical Exam     Initial Vitals [01/28/24 1230]   BP Pulse Resp Temp SpO2   128/85 69 18 97.3 °F (36.3 °C) 100 %      MAP       --         Physical Exam    Nursing note and vitals reviewed.  Constitutional: He appears well-developed and well-nourished. He is not  diaphoretic. No distress.   HENT:   Head: Normocephalic and atraumatic.   Eyes: EOM are normal. Pupils are equal, round, and reactive to light. Right eye exhibits no discharge. Left eye exhibits no discharge.   Neck: Neck supple. No thyromegaly present. No tracheal deviation present. No JVD present.   Normal range of motion.  Cardiovascular:  Normal rate, regular rhythm, normal heart sounds and intact distal pulses.           No murmur heard.  Pulmonary/Chest: Breath sounds normal. No stridor. No respiratory distress. He has no wheezes. He has no rhonchi. He has no rales.   Abdominal: Abdomen is soft. He exhibits no distension. There is no abdominal tenderness. There is no rebound and no guarding.   Musculoskeletal:         General: No tenderness or edema. Normal range of motion.      Cervical back: Normal range of motion and neck supple.     Neurological: He is alert and oriented to person, place, and time. He has normal strength. No cranial nerve deficit. GCS score is 15. GCS eye subscore is 4. GCS verbal subscore is 5. GCS motor subscore is 6.   Skin: Skin is warm and dry. Capillary refill takes less than 2 seconds. No rash and no abscess noted. No erythema. No pallor.   Psychiatric: He has a normal mood and affect. His behavior is normal. Judgment and thought content normal.         ED Course   Procedures  Labs Reviewed   ALCOHOL,MEDICAL (ETHANOL) - Normal   EXTRA TUBES    Narrative:     The following orders were created for panel order EXTRA TUBES.  Procedure                               Abnormality         Status                     ---------                               -----------         ------                     Light Green Top Hold[[id number]]                             Final result               Lavender Top Hold[[id number]]                               Final result                 Please view results for these tests on the individual orders.   LIGHT GREEN TOP HOLD   LAVENDER TOP HOLD           Imaging Results    None          Medications - No data to display  Medical Decision Making  Requesting links to rehabilitative services for alcohol ;    Amount and/or Complexity of Data Reviewed  Discussion of management or test interpretation with external provider(s): Links to alcohol rehabilitative services               ED Course as of 01/28/24 1729   Sun Jan 28, 2024   1357 Negative ethanol level ; [CT]      ED Course User Index  [CT] Wally La MD                           Clinical Impression:  Final diagnoses:  [F10.10] Alcohol abuse (Primary)          ED Disposition Condition    Discharge Stable          ED Prescriptions    None       Follow-up Information       Follow up With Specialties Details Why Contact Info    [facility] - Emergency Dept Emergency Medicine  As needed, If symptoms worsen [address] [facility] [address] [phone]             Wally La MD  01/28/24 8655

## 2024-03-06 ENCOUNTER — HOSPITAL ENCOUNTER (EMERGENCY)
Facility: HOSPITAL | Age: 29
Discharge: HOME | End: 2024-03-06
Attending: EMERGENCY MEDICINE
Payer: MEDICAID

## 2024-03-06 VITALS
RESPIRATION RATE: 20 BRPM | HEIGHT: 67 IN | WEIGHT: 131 LBS | OXYGEN SATURATION: 99 % | BODY MASS INDEX: 20.56 KG/M2 | TEMPERATURE: 99 F | SYSTOLIC BLOOD PRESSURE: 121 MMHG | DIASTOLIC BLOOD PRESSURE: 87 MMHG | HEART RATE: 90 BPM

## 2024-03-06 DIAGNOSIS — R21 RASH AND NONSPECIFIC SKIN ERUPTION: Primary | ICD-10-CM

## 2024-03-06 PROCEDURE — 99284 EMERGENCY DEPT VISIT MOD MDM: CPT

## 2024-03-06 RX ORDER — TRIAMCINOLONE ACETONIDE 1 MG/G
CREAM TOPICAL 2 TIMES DAILY
Qty: 80 G | Refills: 0 | Status: ON HOLD | OUTPATIENT
Start: 2024-03-06 | End: 2024-04-22 | Stop reason: HOSPADM

## 2024-03-06 RX ORDER — MUPIROCIN 20 MG/G
OINTMENT TOPICAL 2 TIMES DAILY
Qty: 22 G | Refills: 0 | Status: SHIPPED | OUTPATIENT
Start: 2024-03-06 | End: 2024-03-11

## 2024-03-07 NOTE — ED PROVIDER NOTES
Encounter Date: 3/6/2024       History     Chief Complaint   Patient presents with    Rash     Pt to er with generalized rash x 2 weeks      Patient states rash to chest and bilateral upper extremities x 2 weeks. States intermittent itching. Denies any drainage, crusting, or fever. 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 Denies any new medications or food. Denies any PMH.     The history is provided by the patient.   Rash   This is a new problem. The current episode started several weeks ago. The problem has been unchanged. The problem is associated with an unknown factor. The rash is present on the left arm, right arm and trunk. The pain is at a severity of 0/10. The pain has been Intermittent since onset. Associated symptoms include itching. Pertinent negatives include no blisters, no pain and no weeping. He has tried nothing for the symptoms. Risk factors include new medications and a change in medications.     Review of patient's allergies indicates:   Allergen Reactions    Penicillins Other (See Comments)     Tolerated rocephin  unknown     History reviewed. No pertinent past medical history.  Past Surgical History:   Procedure Laterality Date    axillary gland       Family History   Problem Relation Age of Onset    Colon cancer Father     Cancer Maternal Grandmother      Social History     Tobacco Use    Smoking status: Never    Smokeless tobacco: Never   Substance Use Topics    Alcohol use: Not Currently     Comment: socially    Drug use: Never     Review of Systems   Constitutional: Negative.  Negative for fever.   HENT: Negative.     Eyes: Negative.    Respiratory: Negative.     Cardiovascular: Negative.    Gastrointestinal: Negative.    Endocrine: Negative.    Genitourinary: Negative.    Musculoskeletal: Negative.    Skin:  Positive for itching and rash.   Allergic/Immunologic: Negative.    Neurological:  [facility]  Pulmonary, Critical Care, and Sleep Medicine    Initial Patient Consult    Name: Lauralee Aschoff MRN: [id number]   : 1946 Hospital: [facility]   Date: 3/2/2019        IMPRESSION:   · Acute on chronic hypoxic respiratory failure   · COPD exacerbation slowly improving, possibly aggravated by mucus plugging  · COPD severe last FEV1 30% on LTOT, steroid dependent  · Adenocarcinoma of the ascending colon. · Intermittent hemoptysis  · Tobacco use disorder recently quit      RECOMMENDATIONS:   · Continue empiric antibiotics, bronchodilators  · Taper steroids as improvement continues  · Continue supplemental O2  · Start NAC with Albuterol nebulizers, Aerobika  · Prophylaxis issues per primary team  · Will follow with you. Thanks for consulting     Subjective: This patient has been seen and evaluated at the request of Dr. Keisha Dawn for COPD exacerbation. Patient is a 71 y.o. female well known to me with severe COPD on LTOT, last FEV1 30%. Pt was in her USOH with baseline dyspnea however a few days ago started complaining of worsening shortness of breath and wet sounding but non productive cough. Pt also complained of malaise without fever but resisted family efforts to take her to the hospital until . She denies chest pain or hemoptysis. No orthopnea or pedal edema. Pt did not tolerate previous trials of Daliresp or Anoro but seems to be maintained on current LABA/ICS and LAMA combination.       Past Medical History:   Diagnosis Date    Anxiety     Arthritis     Asbestosis ([address]     Asthma     Back problem     Baker's cyst     Balance problems     Chronic lung disease     Cigarette smoker     Clotting disorder (HCC)     COPD [facility]chronic obstructive pulmonary disease) ([facility])     oxygen 2/liters asbestosis    Depression     History of DVT (deep vein thrombosis)     Leg pain     Right    Lung disease     Nausea & vomiting     Osteoporosis     Restless leg Negative.    Hematological: Negative.    Psychiatric/Behavioral: Negative.     All other systems reviewed and are negative.      Physical Exam     Initial Vitals [03/06/24 1950]   BP Pulse Resp Temp SpO2   121/87 90 20 98.8 °F (37.1 °C) 99 %      MAP       --         Physical Exam    Nursing note and vitals reviewed.  Constitutional: He appears well-developed and well-nourished. No distress.   HENT:   Head: Normocephalic and atraumatic.   Mouth/Throat: Oropharynx is clear and moist.   Eyes: Conjunctivae and EOM are normal. Pupils are equal, round, and reactive to light.   Neck: Neck supple.   Normal range of motion.  Cardiovascular:  Normal rate, regular rhythm, normal heart sounds and intact distal pulses.           Pulmonary/Chest: Breath sounds normal. No respiratory distress. He has no wheezes.   Abdominal: Abdomen is soft. Bowel sounds are normal. He exhibits no distension. There is no abdominal tenderness.   Musculoskeletal:         General: No edema. Normal range of motion.      Cervical back: Normal range of motion and neck supple.     Neurological: He is alert and oriented to person, place, and time. He has normal strength. GCS score is 15. GCS eye subscore is 4. GCS verbal subscore is 5. GCS motor subscore is 6.   Skin: Skin is warm and dry. Rash (There is a papular and mildly eyrthematous rash to chest and bilateral upper extremities. There is no crusting, drainage, blistering, does not appear vesicular or fungal.) noted.   Psychiatric: He has a normal mood and affect. Thought content normal.         ED Course   Procedures  Labs Reviewed - No data to display       Imaging Results    None          Medications - No data to display  Medical Decision Making  Patient states rash to chest and bilateral upper extremities x 2 weeks. States intermittent itching. Denies any drainage, crusting, or fever. States that he may have come into contact with something that he may be allergic to while using showers at truck  syndrome     Spinal stenosis     Thromboembolus ([facility] [address]     Vitamin D deficiency       Past Surgical History:   Procedure Laterality Date    COLONOSCOPY N/A 9/22/2018    COLONOSCOPY w bx w polypectonies performed by Candace Sidhu MD at [facility] COLONOSCOPY N/A 11/6/2018    COLONOSCOPY performed by Kristine Wayne MD at [facility] HX APPENDECTOMY      HX BACK SURGERY      x4    HX BREAST BIOPSY      left    HX GI      exp lap and ileostomy    HX HEENT      cataract right    HX HYSTERECTOMY      HX LUMBAR LAMINECTOMY      HX MENISCUS REPAIR      HX ORTHOPAEDIC      Knee bakers cyst    HX POLYPECTOMY      right colectomy    HX TONSILLECTOMY      HX VEIN STRIPPING      LAP,SURG,COLECTOMY, PARTIAL, W/ANAST N/A 10/23/2018    Dr. Nico Saenz N/A 11/06/2018    Dr. Con Calamity      vein stripping      Prior to Admission medications    Medication Sig Start Date End Date Taking? Authorizing Provider   simethicone (GAS-X) 80 mg chewable tablet Take 80 mg by mouth every six (6) hours as needed for Flatulence. Yes Provider, Historical   acetylcysteine 500 mg cap Take 500 mg by mouth two (2) times a day. 1/2/19  Yes Boy Conti MD   raNITIdine (ZANTAC) 150 mg tablet Take 150 mg by mouth two (2) times a day. Yes Provider, Historical   albuterol (PROVENTIL VENTOLIN) 2.5 mg /3 mL (0.083 %) nebulizer solution 3 mL by Nebulization route every four (4) hours as needed for Wheezing or Shortness of Breath. Diag. Code: J44.9, R09.02 11/26/18  Yes Boy Conti MD   therapeutic multivitamin [facility]) tablet Take 1 Tab by mouth daily. 9/24/18  Yes Darío Cueva MD   acetaminophen (TYLENOL EXTRA STRENGTH) 500 mg tablet Take  by mouth every six (6) hours as needed for Pain.    Yes Provider, Historical   albuterol (PROAIR HFA) 90 mcg/actuation inhaler INHALE 2 PUFFS BY MOUTH EVERY 4 HOURS AS NEEDED FOR WHEEZING OR SHORTNESS OF BREATH 16  Yes Jassi Pierce MD   fluticasone-salmeterol (ADVAIR) 250-50 mcg/dose diskus inhaler Take 1 Puff by inhalation every twelve (12) hours. 16  Yes Jassi Pierce MD   tiotropium bromide (SPIRIVA RESPIMAT) 2.5 mcg/actuation inhaler Take 2 Puffs by inhalation daily. 16  Yes Daine Rinne, MD   LORazepam (ATIVAN) 1 mg tablet Take  by mouth two (2) times daily as needed for Anxiety. Yes Provider, Historical   rOPINIRole (REQUIP) 1 mg tablet Take  by mouth two (2) times a day. Yes Provider, Historical   HYDROcodone-acetaminophen (NORCO) 5-325 mg per tablet Take 1 Tab by mouth every four (4) hours as needed for Pain. Max Daily Amount: 6 Tabs. 18   Kev Brito MD   OXYGEN-AIR DELIVERY SYSTEMS 2 L by Does Not Apply route. O2 via nasal cannula with bedtime and activity.  O2 Company: [employer]    Provider, Historical     Allergies   Allergen Reactions    Anoro Ellipta [Umeclidinium-Vilanterol] Rash and Other (comments)     Muscle cramps in arms    Aspirin Other (comments)     GI upset and bleeding    Augmentin [Amoxicillin-Pot Clavulanate] Not Reported This Time     Possible cramping    Doxycycline Nausea and Vomiting    Morphine Other (comments)     Neurologic symptoms - severe agitation      Shellfish Derived Unknown (comments)     Pt able to eat small amounts per report     Sulfa (Sulfonamide Antibiotics) Rash     Patient relates no longer allergic      Social History     Tobacco Use    Smoking status: Former Smoker     Packs/day: 0.50     Years: 50.00     Pack years: 25.00     Types: Cigarettes     Start date: 10/21/1964     Last attempt to quit: 10/20/2018     Years since quittin.3    Smokeless tobacco: Never Used   Substance Use Topics    Alcohol use: No      Family History   Problem Relation Age of Onset    Cancer Father     Heart Disease Mother     Hypertension Mother     Cancer Brother     Cancer Sister     Diabetes Other     Hypertension stops while traveling. Denies any new medications or food. Denies any PMH.     The history is provided by the patient.   Rash   This is a new problem. The current episode started several weeks ago. The problem has been unchanged. The problem is associated with an unknown factor. The rash is present on the left arm, right arm and trunk. The pain is at a severity of 0/10. The pain has been Intermittent since onset. Associated symptoms include itching. Pertinent negatives include no blisters, no pain and no weeping. He has tried nothing for the symptoms. Risk factors include new medications and a change in medications.   Patient is awake, alert, afebrile, and nontoxic appearing in the ED.     Amount and/or Complexity of Data Reviewed  Discussion of management or test interpretation with external provider(s): Differential diagnosis (including but not limited to):   Judging by the patient's chief complaint and pertinent history, the patient has the following possible differential diagnoses, including but not limited to the following.  Some of these are deemed to be lower likelihood and some more likely based on my physical exam and history combined with possible lab work and/or imaging studies.   Please see the pertinent studies, and refer to the HPI.  Some of these diagnoses will take further evaluation to fully rule out, perhaps as an outpatient and the patient was encouraged to follow up when discharged for more comprehensive evaluation.  Contact Dermatitis, Rash, Skin Eruption  Will treat with a topical steroid for contact dermatitis. Instructed to follow-up with his PCP. ED return precautions given.                                         Clinical Impression:  Final diagnoses:  [R21] Rash and nonspecific skin eruption (Primary)          ED Disposition Condition    Discharge Stable          ED Prescriptions       Medication Sig Dispense Start Date End Date Auth. Provider    triamcinolone acetonide 0.1% (KENALOG) 0.1  % cream Apply topically 2 (two) times daily. for 7 days 80 g 3/6/2024 3/13/2024 Romi Nunez FNP    mupirocin (BACTROBAN) 2 % ointment Apply topically 2 (two) times daily. for 5 days 22 g 3/6/2024 3/11/2024 Romi Nunez FNP          Follow-up Information       Follow up With Specialties Details Why Contact Info    Primary Care Provider  In 3 days      Please call [phone] to arrange a follow-up appointment with a Primary Care Provider.  In 1 week               Romi Nunez FNP  03/06/24 2051     Other     Stroke Other     Heart Disease Sister     Hypertension Sister     Heart Disease Brother         Current Facility-Administered Medications   Medication Dose Route Frequency    famotidine (PEPCID) tablet 20 mg  20 mg Oral BID    methylPREDNISolone (PF) (SOLU-MEDROL) injection 40 mg  40 mg IntraVENous Q8H    albuterol-ipratropium (DUO-NEB) 2.5 MG-0.5 MG/3 ML  3 mL Nebulization Q6H RT    rOPINIRole (REQUIP) tablet 1 mg  1 mg Oral BID    budesonide-formoterol (SYMBICORT) 160-4.5 mcg/actuation HFA inhaler 2 Puff  2 Puff Inhalation BID RT    therapeutic multivitamin (THERAGRAN) tablet 1 Tab  1 Tab Oral DAILY    sodium chloride (NS) flush 5-40 mL  5-40 mL IntraVENous Q8H    heparin (porcine) injection 5,000 Units  5,000 Units SubCUTAneous Q8H       Review of Systems:  Pertinent items are noted in HPI. Objective:   Vital Signs:    Visit Vitals  /73 (BP 1 Location: Left arm, BP Patient Position: Sitting)   Pulse 73   Temp 98.3 °F (36.8 °C)   Resp 21   Ht 5' 6\" (1.676 m)   Wt 55.2 kg (121 lb 9.6 oz)   SpO2 96%   BMI 19.63 kg/m²       O2 Device: Nasal cannula   O2 Flow Rate (L/min): 2 l/min   Temp (24hrs), Av.3 °F (36.8 °C), Min:97.6 °F (36.4 °C), Max:99 °F (37.2 °C)       Intake/Output:   Last shift:      No intake/output data recorded. Last 3 shifts:  1901 -  0700  In: 1000 [P.O.:1000]  Out: 801 [Urine:801]  No intake or output data in the 24 hours ending 19 1636   Physical Exam:   General:  Alert, cooperative, no distress, appears stated age. cachectic   Head:  Temporal wasting, without obvious abnormality, atraumatic. Eyes:  Conjunctivae/corneas clear. PERRL, EOMs intact. Nose: Nares normal. Mucosa normal. No drainage or sinus tenderness. Throat: Lips, mucosa, and tongue normal.     Neck: Supple, symmetrical, trachea midline, no adenopathy, thyroid: no enlargment/tenderness/nodules, no carotid bruit and no JVD.    Back:   Symmetric    Lungs:   Distant breath sounds, bilateral wheezing , no rales   Chest wall:  No tenderness or deformity. Heart:  Regular rate and rhythm, S1, S2 normal, no murmur, click, rub or gallop. Abdomen:   Soft, non-tender. Bowel sounds normal. No masses,  No organomegaly. Extremities: Extremities normal, atraumatic, no cyanosis or edema. Pulses: 2+ and symmetric all extremities. Skin: Skin color, texture, turgor normal. No rashes or lesions   Lymph nodes: Cervical, supraclavicular nodes normal.   Neurologic: Grossly nonfocal     Data review:     Recent Results (from the past 24 hour(s))   TSH 3RD GENERATION    Collection Time: 03/02/19  3:37 AM   Result Value Ref Range    TSH 0.18 (L) 0.36 - 1.22 uIU/mL   METABOLIC PANEL, BASIC    Collection Time: 03/02/19  3:37 AM   Result Value Ref Range    Sodium 141 136 - 145 mmol/L    Potassium 4.0 3.5 - 5.5 mmol/L    Chloride 109 (H) 100 - 108 mmol/L    CO2 24 21 - 32 mmol/L    Anion gap 8 3.0 - 18 mmol/L    Glucose 168 (H) 74 - 99 mg/dL    BUN 39 (H) 7.0 - 18 MG/DL    Creatinine 0.93 0.6 - 1.3 MG/DL    BUN/Creatinine ratio 42 (H) 12 - 20      GFR est AA >60 >60 ml/min/1.73m2    GFR est non-AA 59 (L) >60 ml/min/1.73m2    Calcium 8.3 (L) 8.5 - 10.1 MG/DL   PHOSPHORUS    Collection Time: 03/02/19  3:37 AM   Result Value Ref Range    Phosphorus 2.2 (L) 2.5 - 4.9 MG/DL   MAGNESIUM    Collection Time: 03/02/19  3:37 AM   Result Value Ref Range    Magnesium 2.3 1.6 - 2.6 mg/dL       Imaging:  I have personally reviewed the patients radiographs and have reviewed the reports:  XR Results (most recent):  Results from Hospital Encounter encounter on 02/28/19   XR CHEST PORT    Narrative EXAM: CHEST ONE VIEW  portable 0315 hours    CLINICAL HISTORY/INDICATION: Severe shortness of breath, cough x1 week    COMPARISON: Chest x-ray November 9, 2018, October 4, 2018. TECHNIQUE: One view obtained. FINDINGS:     The cardiac and mediastinal silhouette is normal. The lungs are markedly  hyperinflated.  The upper lungs are emphysematous. Mildly increased interstitial  markings at the lower third bilaterally. . Pulmonary vascularity is normal. The  costophrenic angles are sharply defined. No bony abnormalities are seen. Impression IMPRESSION:    Severe hyperinflation. Bibasal interstitial prominence also stable consistent with chronic interstitial  disease. CT Results (most recent):  Results from Hospital Encounter encounter on 10/28/18   CT ABD PELV W CONT    Narrative EXAM: CT ABDOMEN/PELVIS  CPT code: 27732, 85663    CLINICAL INDICATION/HISTORY: Status post robotic right colectomy; abdominal  pain, possible abscess. COMPARISON: 20 and October 2018. TECHNIQUE: Helical axial images of the abdomen and pelvis were obtained from the  domes of the diaphragm to the ischia following the administration of intravenous  and oral contrast material.  Contrast: Isovue-300 - 80 mL IV; uneventful  administration. FINDINGS:   CT Abdomen: There is total atelectasis of the right lower lobe and a small  amount of posterior base peripheral and subsegmental atelectasis in the left  lower lobe. Is a mild-to-moderate right and mild left pleural effusion. The  remainder the visualized lung is grossly clear. There is a nasogastric tube  with the tip in the distal mid stomach. There is normal homogeneous enhancement  of the solid abdominal viscera. No biliary abnormalities are seen. The  tomographic nephrogram is unremarkable, bilaterally. There is a right colectomy  and ileo-ascending colostomy with anastomosis in the right lower quadrant. The  bowel distal to the hepatic flexure is completely decompressed. There is  dilatation of the proximal to mid small bowel without involvement of the  duodenum. There is no specific transition point contrast extends into the more  distal nondilated ileum but no contrast is seen into the colon. There is no  significant retroperitoneal lymphadenopathy.   No significant vascular  abnormalities are seen. There has been a probable kyphoplasty L5. There is a  subtle anterolisthesis at L3. There is been posterior fusion L3-4 through  L5-S1. There is mild canal stenosis at L3-4. CT Pelvis: Again noted is prominent dilatation of the mid small bowel with  contrast extending into progressively less dilated distal small bowel. The  visualized colon is completely decompressed. There is a mild amount of free  fluid in the posterior cul-de-sac, similar to that seen previously. No  significant adenopathy is seen. The uterus is surgically absent. No gross  adnexal abnormalities are seen. The bladder is unremarkable. The bones and  soft tissues are unremarkable. Impression IMPRESSION:     Status post right colectomy and ileal ascending colostomy, as described. There  is persistent dilatation of the proximal to mid small bowel. Contrast extends  into the progressively less dilated distal small bowel without evidence of a  transition point. No contrast is seen in the colon. The colon was completely  decompressed distal to the hepatic flexure. This is more suggestive of an  ileus. Complete atelectasis of the right lower lobe with some posterior peripheral and  subsegmental atelectasis in the left lower lobe. Mild-to-moderate right and  mild left pleural effusions. Subcutaneous emphysema has resolved. Hysterectomy; no gross adnexal abnormalities. Small amount of posterior  cul-de-sac free fluid, unchanged.      ===================  Note: [facility] maintains that all CT scans at their facilities  are performed by using dose optimization technique as appropriate to a performed  examination, to include automated exposure control, adjustment of the mAs and/or  kVp according to patient size (including appropriate matching for site specific  examinations) or use of iterative reconstruction technique.               Cem Lopez MD

## 2024-04-15 ENCOUNTER — HOSPITAL ENCOUNTER (INPATIENT)
Facility: HOSPITAL | Age: 29
LOS: 7 days | Discharge: PSYCHIATRIC OR SUBSTANCE USE FACILITY | DRG: 084 | End: 2024-04-22
Attending: EMERGENCY MEDICINE | Admitting: SURGERY
Payer: MEDICAID

## 2024-04-15 DIAGNOSIS — R90.89 ABNORMAL CT OF BRAIN: ICD-10-CM

## 2024-04-15 DIAGNOSIS — S06.0XAA CONCUSSION WITH UNKNOWN LOSS OF CONSCIOUSNESS STATUS, INITIAL ENCOUNTER: ICD-10-CM

## 2024-04-15 DIAGNOSIS — T14.90XA TRAUMA: ICD-10-CM

## 2024-04-15 DIAGNOSIS — S00.93XA TRAUMATIC HEMATOMA OF HEAD, INITIAL ENCOUNTER: ICD-10-CM
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
## 2024-04-15 LAB
APTT PPP: 27 SECONDS (ref 23.2–33.7)
BASOPHILS # BLD AUTO: 0.02 X10(3)/MCL
BASOPHILS NFR BLD AUTO: 0.2 %
EOSINOPHIL # BLD AUTO: 0.04 X10(3)/MCL (ref 0–0.9)
EOSINOPHIL NFR BLD AUTO: 0.4 %
ERYTHROCYTE [DISTWIDTH] IN BLOOD BY AUTOMATED COUNT: 12.6 % (ref 11.5–17)
HCT VFR BLD AUTO: 41.9 % (ref 42–52)
HGB BLD-MCNC: 13.9 G/DL (ref 14–18)
IMM GRANULOCYTES # BLD AUTO: 0.04 X10(3)/MCL (ref 0–0.04)
IMM GRANULOCYTES NFR BLD AUTO: 0.4 %
INR PPP: 1.2
LYMPHOCYTES # BLD AUTO: 2.6 X10(3)/MCL (ref 0.6–4.6)
LYMPHOCYTES NFR BLD AUTO: 28.4 %
MCH RBC QN AUTO: 29.2 PG (ref 27–31)
MCHC RBC AUTO-ENTMCNC: 33.2 G/DL (ref 33–36)
MCV RBC AUTO: 88 FL (ref 80–94)
MONOCYTES # BLD AUTO: 1.09 X10(3)/MCL (ref 0.1–1.3)
MONOCYTES NFR BLD AUTO: 11.9 %
NEUTROPHILS # BLD AUTO: 5.35 X10(3)/MCL (ref 2.1–9.2)
NEUTROPHILS NFR BLD AUTO: 58.7 %
NRBC BLD AUTO-RTO: 0 %
PLATELET # BLD AUTO: 288 X10(3)/MCL (ref 130–400)
PMV BLD AUTO: 8.7 FL (ref 7.4–10.4)
PROTHROMBIN TIME: 14.6 SECONDS (ref 12.5–14.5)
RBC # BLD AUTO: 4.76 X10(6)/MCL (ref 4.7–6.1)
WBC # SPEC AUTO: 9.14 X10(3)/MCL (ref 4.5–11.5)

## 2024-04-15 PROCEDURE — 25000003 PHARM REV CODE 250: Performed by: EMERGENCY MEDICINE

## 2024-04-15 PROCEDURE — 85025 COMPLETE CBC W/AUTO DIFF WBC: CPT | Performed by: EMERGENCY MEDICINE

## 2024-04-15 PROCEDURE — 85730 THROMBOPLASTIN TIME PARTIAL: CPT | Performed by: EMERGENCY MEDICINE

## 2024-04-15 PROCEDURE — 25000003 PHARM REV CODE 250: Performed by: PHYSICIAN ASSISTANT

## 2024-04-15 PROCEDURE — 99285 EMERGENCY DEPT VISIT HI MDM: CPT | Mod: 25

## 2024-04-15 PROCEDURE — 80053 COMPREHEN METABOLIC PANEL: CPT | Performed by: EMERGENCY MEDICINE

## 2024-04-15 PROCEDURE — 96360 HYDRATION IV INFUSION INIT: CPT

## 2024-04-15 PROCEDURE — 11000001 HC ACUTE MED/SURG PRIVATE ROOM

## 2024-04-15 PROCEDURE — 82077 ASSAY SPEC XCP UR&BREATH IA: CPT | Performed by: EMERGENCY MEDICINE

## 2024-04-15 PROCEDURE — 85610 PROTHROMBIN TIME: CPT | Performed by: EMERGENCY MEDICINE

## 2024-04-15 RX ORDER — SODIUM CHLORIDE 9 MG/ML
1000 INJECTION, SOLUTION INTRAVENOUS
Status: COMPLETED | OUTPATIENT
Start: 2024-04-15 | End: 2024-04-16

## 2024-04-15 RX ORDER — HYDROCODONE BITARTRATE AND ACETAMINOPHEN 10; 325 MG/1; MG/1
1 TABLET ORAL
Status: COMPLETED | OUTPATIENT
Start: 2024-04-15 | End: 2024-04-15

## 2024-04-15 RX ADMIN — HYDROCODONE BITARTRATE AND ACETAMINOPHEN 1 TABLET: 10; 325 TABLET ORAL at 10:04

## 2024-04-15 RX ADMIN — SODIUM CHLORIDE 1000 ML: 9 INJECTION, SOLUTION INTRAVENOUS at 11:04

## 2024-04-15 RX ADMIN — IOPAMIDOL 100 ML: 755 INJECTION, SOLUTION INTRAVENOUS at 11:04
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
## 2024-04-16 PROBLEM — S06.5XAA SDH (SUBDURAL HEMATOMA): Status: ACTIVE | Noted: 2024-04-16

## 2024-04-16 LAB
ALBUMIN SERPL-MCNC: 4.2 G/DL (ref 3.5–5)
ALBUMIN SERPL-MCNC: 4.2 G/DL (ref 3.5–5)
ALBUMIN/GLOB SERPL: 1.4 RATIO (ref 1.1–2)
ALBUMIN/GLOB SERPL: 1.6 RATIO (ref 1.1–2)
ALP SERPL-CCNC: 36 UNIT/L (ref 40–150)
ALP SERPL-CCNC: 37 UNIT/L (ref 40–150)
ALT SERPL-CCNC: 27 UNIT/L (ref 0–55)
ALT SERPL-CCNC: 27 UNIT/L (ref 0–55)
APPEARANCE UR: CLEAR
AST SERPL-CCNC: 39 UNIT/L (ref 5–34)
AST SERPL-CCNC: 39 UNIT/L (ref 5–34)
BACTERIA #/AREA URNS AUTO: ABNORMAL /HPF
BASOPHILS # BLD AUTO: 0.02 X10(3)/MCL
BASOPHILS NFR BLD AUTO: 0.2 %
BILIRUB SERPL-MCNC: 1.1 MG/DL
BILIRUB SERPL-MCNC: 1.1 MG/DL
BILIRUB UR QL STRIP.AUTO: NEGATIVE
BUN SERPL-MCNC: 13.7 MG/DL (ref 8.9–20.6)
BUN SERPL-MCNC: 15.7 MG/DL (ref 8.9–20.6)
CALCIUM SERPL-MCNC: 9.5 MG/DL (ref 8.4–10.2)
CALCIUM SERPL-MCNC: 9.6 MG/DL (ref 8.4–10.2)
CHLORIDE SERPL-SCNC: 102 MMOL/L (ref 98–107)
CHLORIDE SERPL-SCNC: 105 MMOL/L (ref 98–107)
CO2 SERPL-SCNC: 22 MMOL/L (ref 22–29)
CO2 SERPL-SCNC: 26 MMOL/L (ref 22–29)
COLOR UR AUTO: YELLOW
CREAT SERPL-MCNC: 0.84 MG/DL (ref 0.73–1.18)
CREAT SERPL-MCNC: 0.94 MG/DL (ref 0.73–1.18)
EOSINOPHIL # BLD AUTO: 0.07 X10(3)/MCL (ref 0–0.9)
EOSINOPHIL NFR BLD AUTO: 0.9 %
ERYTHROCYTE [DISTWIDTH] IN BLOOD BY AUTOMATED COUNT: 12.6 % (ref 11.5–17)
ETHANOL SERPL-MCNC: <10 MG/DL
GFR SERPLBLD CREATININE-BSD FMLA CKD-EPI: >60 MLS/MIN/1.73/M2
GFR SERPLBLD CREATININE-BSD FMLA CKD-EPI: >60 MLS/MIN/1.73/M2
GLOBULIN SER-MCNC: 2.7 GM/DL (ref 2.4–3.5)
GLOBULIN SER-MCNC: 2.9 GM/DL (ref 2.4–3.5)
GLUCOSE SERPL-MCNC: 90 MG/DL (ref 74–100)
GLUCOSE SERPL-MCNC: 99 MG/DL (ref 74–100)
GLUCOSE UR QL STRIP.AUTO: NORMAL
HCT VFR BLD AUTO: 41.6 % (ref 42–52)
HGB BLD-MCNC: 14 G/DL (ref 14–18)
IMM GRANULOCYTES # BLD AUTO: 0.03 X10(3)/MCL (ref 0–0.04)
IMM GRANULOCYTES NFR BLD AUTO: 0.4 %
KETONES UR QL STRIP.AUTO: ABNORMAL
LEUKOCYTE ESTERASE UR QL STRIP.AUTO: NEGATIVE
LYMPHOCYTES # BLD AUTO: 2.73 X10(3)/MCL (ref 0.6–4.6)
LYMPHOCYTES NFR BLD AUTO: 33.3 %
MAGNESIUM SERPL-MCNC: 2 MG/DL (ref 1.6–2.6)
MCH RBC QN AUTO: 29.5 PG (ref 27–31)
MCHC RBC AUTO-ENTMCNC: 33.7 G/DL (ref 33–36)
MCV RBC AUTO: 87.6 FL (ref 80–94)
MONOCYTES # BLD AUTO: 0.94 X10(3)/MCL (ref 0.1–1.3)
MONOCYTES NFR BLD AUTO: 11.5 %
MUCOUS THREADS URNS QL MICRO: ABNORMAL /LPF
NEUTROPHILS # BLD AUTO: 4.41 X10(3)/MCL (ref 2.1–9.2)
NEUTROPHILS NFR BLD AUTO: 53.7 %
NITRITE UR QL STRIP.AUTO: NEGATIVE
NRBC BLD AUTO-RTO: 0 %
PH UR STRIP.AUTO: 6.5 [PH]
PHOSPHATE SERPL-MCNC: 4.2 MG/DL (ref 2.3–4.7)
PLATELET # BLD AUTO: 286 X10(3)/MCL (ref 130–400)
PMV BLD AUTO: 8.6 FL (ref 7.4–10.4)
POTASSIUM SERPL-SCNC: 3.6 MMOL/L (ref 3.5–5.1)
POTASSIUM SERPL-SCNC: 4 MMOL/L (ref 3.5–5.1)
PROT SERPL-MCNC: 6.9 GM/DL (ref 6.4–8.3)
PROT SERPL-MCNC: 7.1 GM/DL (ref 6.4–8.3)
PROT UR QL STRIP.AUTO: ABNORMAL
RBC # BLD AUTO: 4.75 X10(6)/MCL (ref 4.7–6.1)
RBC #/AREA URNS AUTO: ABNORMAL /HPF
RBC UR QL AUTO: NEGATIVE
SODIUM SERPL-SCNC: 136 MMOL/L (ref 136–145)
SODIUM SERPL-SCNC: 138 MMOL/L (ref 136–145)
SP GR UR STRIP.AUTO: >1.05 (ref 1–1.03)
SQUAMOUS #/AREA URNS LPF: ABNORMAL /HPF
UROBILINOGEN UR STRIP-ACNC: 2
WBC # SPEC AUTO: 8.2 X10(3)/MCL (ref 4.5–11.5)
WBC #/AREA URNS AUTO: ABNORMAL /HPF

## 2024-04-16 PROCEDURE — 83735 ASSAY OF MAGNESIUM: CPT | Performed by: NURSE PRACTITIONER

## 2024-04-16 PROCEDURE — 63600175 PHARM REV CODE 636 W HCPCS: Performed by: NURSE PRACTITIONER

## 2024-04-16 PROCEDURE — 25500020 PHARM REV CODE 255: Performed by: EMERGENCY MEDICINE

## 2024-04-16 PROCEDURE — 81001 URINALYSIS AUTO W/SCOPE: CPT | Performed by: EMERGENCY MEDICINE

## 2024-04-16 PROCEDURE — G0378 HOSPITAL OBSERVATION PER HR: HCPCS

## 2024-04-16 PROCEDURE — 80053 COMPREHEN METABOLIC PANEL: CPT | Performed by: NURSE PRACTITIONER

## 2024-04-16 PROCEDURE — 85025 COMPLETE CBC W/AUTO DIFF WBC: CPT | Performed by: NURSE PRACTITIONER

## 2024-04-16 PROCEDURE — 11000001 HC ACUTE MED/SURG PRIVATE ROOM

## 2024-04-16 PROCEDURE — 84100 ASSAY OF PHOSPHORUS: CPT | Performed by: NURSE PRACTITIONER

## 2024-04-16 PROCEDURE — 25000003 PHARM REV CODE 250: Performed by: NURSE PRACTITIONER

## 2024-04-16 RX ORDER — ADHESIVE BANDAGE
30 BANDAGE TOPICAL DAILY PRN
Status: DISCONTINUED | OUTPATIENT
Start: 2024-04-16 | End: 2024-04-22 | Stop reason: HOSPADM

## 2024-04-16 RX ORDER — OXYCODONE HYDROCHLORIDE 5 MG/1
5 TABLET ORAL EVERY 6 HOURS PRN
Qty: 6 TABLET | Refills: 0 | Status: CANCELLED | OUTPATIENT
Start: 2024-04-16

## 2024-04-16 RX ORDER — ACETAMINOPHEN 325 MG/1
650 TABLET ORAL EVERY 4 HOURS
Status: DISPENSED | OUTPATIENT
Start: 2024-04-16 | End: 2024-04-21

## 2024-04-16 RX ORDER — LEVETIRACETAM 500 MG/1
500 TABLET ORAL 2 TIMES DAILY
Status: DISCONTINUED | OUTPATIENT
Start: 2024-04-16 | End: 2024-04-22 | Stop reason: HOSPADM

## 2024-04-16 RX ORDER — TALC
6 POWDER (GRAM) TOPICAL NIGHTLY PRN
Status: DISCONTINUED | OUTPATIENT
Start: 2024-04-16 | End: 2024-04-22 | Stop reason: HOSPADM

## 2024-04-16 RX ORDER — POLYETHYLENE GLYCOL 3350 17 G/17G
17 POWDER, FOR SOLUTION ORAL 2 TIMES DAILY
Status: DISCONTINUED | OUTPATIENT
Start: 2024-04-16 | End: 2024-04-22 | Stop reason: HOSPADM

## 2024-04-16 RX ORDER — LEVETIRACETAM 500 MG/1
500 TABLET ORAL 2 TIMES DAILY
Qty: 14 TABLET | Refills: 0 | Status: CANCELLED | OUTPATIENT
Start: 2024-04-16 | End: 2024-04-23

## 2024-04-16 RX ORDER — SODIUM CHLORIDE, SODIUM LACTATE, POTASSIUM CHLORIDE, CALCIUM CHLORIDE 600; 310; 30; 20 MG/100ML; MG/100ML; MG/100ML; MG/100ML
INJECTION, SOLUTION INTRAVENOUS CONTINUOUS
Status: DISCONTINUED | OUTPATIENT
Start: 2024-04-16 | End: 2024-04-17

## 2024-04-16 RX ORDER — OXYCODONE HYDROCHLORIDE 5 MG/1
5 TABLET ORAL EVERY 4 HOURS PRN
Status: DISCONTINUED | OUTPATIENT
Start: 2024-04-16 | End: 2024-04-22 | Stop reason: HOSPADM

## 2024-04-16 RX ORDER — DOCUSATE SODIUM 100 MG/1
100 CAPSULE, LIQUID FILLED ORAL 2 TIMES DAILY
Status: DISCONTINUED | OUTPATIENT
Start: 2024-04-16 | End: 2024-04-22 | Stop reason: HOSPADM

## 2024-04-16 RX ORDER — METHOCARBAMOL 500 MG/1
500 TABLET, FILM COATED ORAL EVERY 8 HOURS
Status: DISCONTINUED | OUTPATIENT
Start: 2024-04-16 | End: 2024-04-18

## 2024-04-16 RX ADMIN — SODIUM CHLORIDE, POTASSIUM CHLORIDE, SODIUM LACTATE AND CALCIUM CHLORIDE: 600; 310; 30; 20 INJECTION, SOLUTION INTRAVENOUS at 08:04

## 2024-04-16 RX ADMIN — METHOCARBAMOL 500 MG: 500 TABLET ORAL at 01:04

## 2024-04-16 RX ADMIN — METHOCARBAMOL 500 MG: 500 TABLET ORAL at 10:04

## 2024-04-16 RX ADMIN — ACETAMINOPHEN 650 MG: 325 TABLET, FILM COATED ORAL at 06:04

## 2024-04-16 RX ADMIN — LEVETIRACETAM 500 MG: 500 TABLET, FILM COATED ORAL at 10:04

## 2024-04-16 RX ADMIN — SODIUM CHLORIDE, POTASSIUM CHLORIDE, SODIUM LACTATE AND CALCIUM CHLORIDE: 600; 310; 30; 20 INJECTION, SOLUTION INTRAVENOUS at 01:04

## 2024-04-16 RX ADMIN — ACETAMINOPHEN 650 MG: 325 TABLET, FILM COATED ORAL at 01:04

## 2024-04-16 RX ADMIN — METHOCARBAMOL 500 MG: 500 TABLET ORAL at 06:04

## 2024-04-16 RX ADMIN — ACETAMINOPHEN 650 MG: 325 TABLET, FILM COATED ORAL at 09:04

## 2024-04-16 RX ADMIN — ACETAMINOPHEN 650 MG: 325 TABLET, FILM COATED ORAL at 02:04

## 2024-04-16 RX ADMIN — OXYCODONE HYDROCHLORIDE 5 MG: 5 TABLET ORAL at 07:04

## 2024-04-16 RX ADMIN — LEVETIRACETAM 500 MG: 500 TABLET, FILM COATED ORAL at 09:04

## 2024-04-16 RX ADMIN — ACETAMINOPHEN 650 MG: 325 TABLET, FILM COATED ORAL at 07:04

## 2024-04-16 NOTE — ED NOTES
Pt arrives via [facility], EMS transfer from [facility], for Neuro services. Pt is GCS 14, pt appears agitated wanting to leave.
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
## 2024-04-17 LAB
ALBUMIN SERPL-MCNC: 3.5 G/DL (ref 3.5–5)
ALBUMIN/GLOB SERPL: 1.3 RATIO (ref 1.1–2)
ALP SERPL-CCNC: 34 UNIT/L (ref 40–150)
ALT SERPL-CCNC: 22 UNIT/L (ref 0–55)
AST SERPL-CCNC: 28 UNIT/L (ref 5–34)
BASOPHILS # BLD AUTO: 0.02 X10(3)/MCL
BASOPHILS NFR BLD AUTO: 0.4 %
BILIRUB SERPL-MCNC: 0.6 MG/DL
BUN SERPL-MCNC: 14.5 MG/DL (ref 8.9–20.6)
CALCIUM SERPL-MCNC: 9 MG/DL (ref 8.4–10.2)
CHLORIDE SERPL-SCNC: 110 MMOL/L (ref 98–107)
CO2 SERPL-SCNC: 21 MMOL/L (ref 22–29)
CREAT SERPL-MCNC: 0.87 MG/DL (ref 0.73–1.18)
EOSINOPHIL # BLD AUTO: 0.1 X10(3)/MCL (ref 0–0.9)
EOSINOPHIL NFR BLD AUTO: 1.8 %
ERYTHROCYTE [DISTWIDTH] IN BLOOD BY AUTOMATED COUNT: 12.3 % (ref 11.5–17)
GFR SERPLBLD CREATININE-BSD FMLA CKD-EPI: >60 MLS/MIN/1.73/M2
GLOBULIN SER-MCNC: 2.7 GM/DL (ref 2.4–3.5)
GLUCOSE SERPL-MCNC: 87 MG/DL (ref 74–100)
HCT VFR BLD AUTO: 41.1 % (ref 42–52)
HGB BLD-MCNC: 13.4 G/DL (ref 14–18)
IMM GRANULOCYTES # BLD AUTO: 0.01 X10(3)/MCL (ref 0–0.04)
IMM GRANULOCYTES NFR BLD AUTO: 0.2 %
LYMPHOCYTES # BLD AUTO: 2.27 X10(3)/MCL (ref 0.6–4.6)
LYMPHOCYTES NFR BLD AUTO: 40.8 %
MCH RBC QN AUTO: 29.3 PG (ref 27–31)
MCHC RBC AUTO-ENTMCNC: 32.6 G/DL (ref 33–36)
MCV RBC AUTO: 89.7 FL (ref 80–94)
MONOCYTES # BLD AUTO: 0.58 X10(3)/MCL (ref 0.1–1.3)
MONOCYTES NFR BLD AUTO: 10.4 %
NEUTROPHILS # BLD AUTO: 2.59 X10(3)/MCL (ref 2.1–9.2)
NEUTROPHILS NFR BLD AUTO: 46.4 %
NRBC BLD AUTO-RTO: 0 %
PLATELET # BLD AUTO: 253 X10(3)/MCL (ref 130–400)
PMV BLD AUTO: 8.8 FL (ref 7.4–10.4)
POTASSIUM SERPL-SCNC: 3.9 MMOL/L (ref 3.5–5.1)
PROT SERPL-MCNC: 6.2 GM/DL (ref 6.4–8.3)
RBC # BLD AUTO: 4.58 X10(6)/MCL (ref 4.7–6.1)
SODIUM SERPL-SCNC: 140 MMOL/L (ref 136–145)
WBC # SPEC AUTO: 5.57 X10(3)/MCL (ref 4.5–11.5)

## 2024-04-17 PROCEDURE — 85025 COMPLETE CBC W/AUTO DIFF WBC: CPT | Performed by: NURSE PRACTITIONER

## 2024-04-17 PROCEDURE — 97162 PT EVAL MOD COMPLEX 30 MIN: CPT

## 2024-04-17 PROCEDURE — 92523 SPEECH SOUND LANG COMPREHEN: CPT

## 2024-04-17 PROCEDURE — 25000003 PHARM REV CODE 250: Performed by: NURSE PRACTITIONER

## 2024-04-17 PROCEDURE — 11000001 HC ACUTE MED/SURG PRIVATE ROOM

## 2024-04-17 PROCEDURE — 80053 COMPREHEN METABOLIC PANEL: CPT | Performed by: NURSE PRACTITIONER

## 2024-04-17 PROCEDURE — 21400001 HC TELEMETRY ROOM

## 2024-04-17 RX ADMIN — OXYCODONE HYDROCHLORIDE 5 MG: 5 TABLET ORAL at 06:04

## 2024-04-17 RX ADMIN — ACETAMINOPHEN 650 MG: 325 TABLET, FILM COATED ORAL at 02:04

## 2024-04-17 RX ADMIN — OXYCODONE HYDROCHLORIDE 5 MG: 5 TABLET ORAL at 05:04

## 2024-04-17 RX ADMIN — ACETAMINOPHEN 650 MG: 325 TABLET, FILM COATED ORAL at 05:04

## 2024-04-17 RX ADMIN — METHOCARBAMOL 500 MG: 500 TABLET ORAL at 10:04

## 2024-04-17 RX ADMIN — DOCUSATE SODIUM 100 MG: 100 CAPSULE, LIQUID FILLED ORAL at 09:04

## 2024-04-17 RX ADMIN — POLYETHYLENE GLYCOL 3350 17 G: 17 POWDER, FOR SOLUTION ORAL at 09:04

## 2024-04-17 RX ADMIN — METHOCARBAMOL 500 MG: 500 TABLET ORAL at 02:04

## 2024-04-17 RX ADMIN — LEVETIRACETAM 500 MG: 500 TABLET, FILM COATED ORAL at 08:04

## 2024-04-17 RX ADMIN — LEVETIRACETAM 500 MG: 500 TABLET, FILM COATED ORAL at 09:04

## 2024-04-17 RX ADMIN — ACETAMINOPHEN 650 MG: 325 TABLET, FILM COATED ORAL at 09:04

## 2024-04-17 RX ADMIN — METHOCARBAMOL 500 MG: 500 TABLET ORAL at 05:04

## 2024-04-17 RX ADMIN — ACETAMINOPHEN 650 MG: 325 TABLET, FILM COATED ORAL at 10:04
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
## 2024-04-17 NOTE — PLAN OF CARE
Problem: Adult Inpatient Plan of Care  Goal: Plan of Care Review  4/17/2024 0413 by Sandra Bartlett LPN  Outcome: Ongoing, Progressing  4/16/2024 2325 by Sandra Bartlett LPN  Outcome: Ongoing, Progressing  Goal: Patient-Specific Goal (Individualized)  4/17/2024 0413 by Sandra Bartlett LPN  Outcome: Ongoing, Progressing  4/16/2024 2325 by Sandra Bartlett LPN  Outcome: Ongoing, Progressing  Goal: Absence of Hospital-Acquired Illness or Injury  4/17/2024 0413 by Sandra Bartlett LPN  Outcome: Ongoing, Progressing  4/16/2024 2325 by Sandra Bartlett LPN  Outcome: Ongoing, Progressing  Goal: Optimal Comfort and Wellbeing  4/17/2024 0413 by Sandra Bartlett LPN  Outcome: Ongoing, Progressing  4/16/2024 2325 by Sandra Bartlett LPN  Outcome: Ongoing, Progressing  Goal: Readiness for Transition of Care  4/17/2024 0413 by Sandra Bartlett LPN  Outcome: Ongoing, Progressing  4/16/2024 2325 by Sandra Bartlett LPN  Outcome: Ongoing, Progressing
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
## 2024-04-17 NOTE — NURSING
Nurses Note -- 4 Eyes      4/17/2024   1:38 AM      Skin assessed during: Admit      [] No Altered Skin Integrity Present    []Prevention Measures Documented      [x] Yes- Altered Skin Integrity Present or Discovered   [] LDA Added if Not in Epic (Describe Wound)   [] New Altered Skin Integrity was Present on Admit and Documented in LDA   [x] Wound Image Taken    Wound Care Consulted? Yes    Attending Nurse:  RUTH Flores    Second RN/Staff Member:   RUTH Linares

## 2024-04-17 NOTE — PLAN OF CARE
Problem: Physical Therapy  Goal: Physical Therapy Goal  Description: Pt will be seen for the following goals  1. Pt will ambulate with a rw cynthia for 50ft  2. Pt will be sba with bed mobility  3. Pt will be cynthia with sit to stand to a rw  Outcome: Ongoing, Progressing
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
## 2024-04-18 LAB
ALBUMIN SERPL-MCNC: 3.9 G/DL (ref 3.5–5)
ALBUMIN/GLOB SERPL: 1.3 RATIO (ref 1.1–2)
ALP SERPL-CCNC: 32 UNIT/L (ref 40–150)
ALT SERPL-CCNC: 19 UNIT/L (ref 0–55)
AST SERPL-CCNC: 25 UNIT/L (ref 5–34)
BASOPHILS # BLD AUTO: 0.02 X10(3)/MCL
BASOPHILS NFR BLD AUTO: 0.4 %
BILIRUB SERPL-MCNC: 0.9 MG/DL
BUN SERPL-MCNC: 10.5 MG/DL (ref 8.9–20.6)
CALCIUM SERPL-MCNC: 9.6 MG/DL (ref 8.4–10.2)
CHLORIDE SERPL-SCNC: 107 MMOL/L (ref 98–107)
CO2 SERPL-SCNC: 23 MMOL/L (ref 22–29)
CREAT SERPL-MCNC: 0.79 MG/DL (ref 0.73–1.18)
CRP SERPL-MCNC: 3.1 MG/L
EOSINOPHIL # BLD AUTO: 0.12 X10(3)/MCL (ref 0–0.9)
EOSINOPHIL NFR BLD AUTO: 2.2 %
ERYTHROCYTE [DISTWIDTH] IN BLOOD BY AUTOMATED COUNT: 11.9 % (ref 11.5–17)
GFR SERPLBLD CREATININE-BSD FMLA CKD-EPI: >60 MLS/MIN/1.73/M2
GLOBULIN SER-MCNC: 2.9 GM/DL (ref 2.4–3.5)
GLUCOSE SERPL-MCNC: 86 MG/DL (ref 74–100)
HCT VFR BLD AUTO: 41.8 % (ref 42–52)
HGB BLD-MCNC: 13.9 G/DL (ref 14–18)
IMM GRANULOCYTES # BLD AUTO: 0.01 X10(3)/MCL (ref 0–0.04)
IMM GRANULOCYTES NFR BLD AUTO: 0.2 %
LYMPHOCYTES # BLD AUTO: 2.46 X10(3)/MCL (ref 0.6–4.6)
LYMPHOCYTES NFR BLD AUTO: 45.5 %
MCH RBC QN AUTO: 29.8 PG (ref 27–31)
MCHC RBC AUTO-ENTMCNC: 33.3 G/DL (ref 33–36)
MCV RBC AUTO: 89.7 FL (ref 80–94)
MONOCYTES # BLD AUTO: 0.54 X10(3)/MCL (ref 0.1–1.3)
MONOCYTES NFR BLD AUTO: 10 %
NEUTROPHILS # BLD AUTO: 2.26 X10(3)/MCL (ref 2.1–9.2)
NEUTROPHILS NFR BLD AUTO: 41.7 %
NRBC BLD AUTO-RTO: 0 %
PLATELET # BLD AUTO: 246 X10(3)/MCL (ref 130–400)
PMV BLD AUTO: 8.9 FL (ref 7.4–10.4)
POTASSIUM SERPL-SCNC: 4.2 MMOL/L (ref 3.5–5.1)
PREALB SERPL-MCNC: 28.6 MG/DL (ref 18–45)
PROT SERPL-MCNC: 6.8 GM/DL (ref 6.4–8.3)
RBC # BLD AUTO: 4.66 X10(6)/MCL (ref 4.7–6.1)
SODIUM SERPL-SCNC: 140 MMOL/L (ref 136–145)
WBC # SPEC AUTO: 5.41 X10(3)/MCL (ref 4.5–11.5)

## 2024-04-18 PROCEDURE — 97530 THERAPEUTIC ACTIVITIES: CPT

## 2024-04-18 PROCEDURE — 21400001 HC TELEMETRY ROOM

## 2024-04-18 PROCEDURE — 25000003 PHARM REV CODE 250: Performed by: NURSE PRACTITIONER

## 2024-04-18 PROCEDURE — 85025 COMPLETE CBC W/AUTO DIFF WBC: CPT | Performed by: NURSE PRACTITIONER

## 2024-04-18 PROCEDURE — 80053 COMPREHEN METABOLIC PANEL: CPT | Performed by: NURSE PRACTITIONER

## 2024-04-18 PROCEDURE — 86140 C-REACTIVE PROTEIN: CPT | Performed by: NURSE PRACTITIONER

## 2024-04-18 PROCEDURE — 63600175 PHARM REV CODE 636 W HCPCS

## 2024-04-18 PROCEDURE — 84134 ASSAY OF PREALBUMIN: CPT | Performed by: NURSE PRACTITIONER

## 2024-04-18 PROCEDURE — 25000003 PHARM REV CODE 250

## 2024-04-18 RX ORDER — KETOROLAC TROMETHAMINE 30 MG/ML
15 INJECTION, SOLUTION INTRAMUSCULAR; INTRAVENOUS EVERY 6 HOURS
Status: DISPENSED | OUTPATIENT
Start: 2024-04-18 | End: 2024-04-21

## 2024-04-18 RX ORDER — METHOCARBAMOL 750 MG/1
750 TABLET, FILM COATED ORAL EVERY 8 HOURS
Status: DISCONTINUED | OUTPATIENT
Start: 2024-04-18 | End: 2024-04-22 | Stop reason: HOSPADM

## 2024-04-18 RX ORDER — ENOXAPARIN SODIUM 100 MG/ML
40 INJECTION SUBCUTANEOUS EVERY 12 HOURS
Status: DISCONTINUED | OUTPATIENT
Start: 2024-04-18 | End: 2024-04-22 | Stop reason: HOSPADM

## 2024-04-18 RX ADMIN — LEVETIRACETAM 500 MG: 500 TABLET, FILM COATED ORAL at 09:04

## 2024-04-18 RX ADMIN — ENOXAPARIN SODIUM 40 MG: 40 INJECTION SUBCUTANEOUS at 08:04

## 2024-04-18 RX ADMIN — ACETAMINOPHEN 650 MG: 325 TABLET, FILM COATED ORAL at 05:04

## 2024-04-18 RX ADMIN — LEVETIRACETAM 500 MG: 500 TABLET, FILM COATED ORAL at 08:04

## 2024-04-18 RX ADMIN — OXYCODONE HYDROCHLORIDE 5 MG: 5 TABLET ORAL at 06:04

## 2024-04-18 RX ADMIN — OXYCODONE HYDROCHLORIDE 5 MG: 5 TABLET ORAL at 01:04

## 2024-04-18 RX ADMIN — KETOROLAC TROMETHAMINE 15 MG: 30 INJECTION, SOLUTION INTRAMUSCULAR at 11:04

## 2024-04-18 RX ADMIN — DOCUSATE SODIUM 100 MG: 100 CAPSULE, LIQUID FILLED ORAL at 09:04

## 2024-04-18 RX ADMIN — POLYETHYLENE GLYCOL 3350 17 G: 17 POWDER, FOR SOLUTION ORAL at 08:04

## 2024-04-18 RX ADMIN — ACETAMINOPHEN 650 MG: 325 TABLET, FILM COATED ORAL at 09:04

## 2024-04-18 RX ADMIN — METHOCARBAMOL 750 MG: 750 TABLET ORAL at 09:04

## 2024-04-18 RX ADMIN — KETOROLAC TROMETHAMINE 15 MG: 30 INJECTION, SOLUTION INTRAMUSCULAR at 05:04

## 2024-04-18 RX ADMIN — METHOCARBAMOL 750 MG: 750 TABLET ORAL at 01:04

## 2024-04-18 RX ADMIN — METHOCARBAMOL 500 MG: 500 TABLET ORAL at 06:04

## 2024-04-18 RX ADMIN — ENOXAPARIN SODIUM 40 MG: 40 INJECTION SUBCUTANEOUS at 09:04

## 2024-04-18 RX ADMIN — DOCUSATE SODIUM 100 MG: 100 CAPSULE, LIQUID FILLED ORAL at 08:04

## 2024-04-18 NOTE — NURSING
Patient had two male visitors (Socrates and Jonathan) visit. Okay per patient that they visited. Patient states that he knows the visitors, but he does not remember their names. Patient stated okay to give the visitors Work excuse given to visitors for today, but I did inform the patient that the excuse would only be given today, so if they visit again they will not be given a work excuse. Patient understood.
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
## 2024-04-19 LAB
AMPHET UR QL SCN: NEGATIVE
BARBITURATE SCN PRESENT UR: NEGATIVE
BENZODIAZ UR QL SCN: NEGATIVE
CANNABINOIDS UR QL SCN: NEGATIVE
COCAINE UR QL SCN: NEGATIVE
FENTANYL UR QL SCN: NEGATIVE
MDMA UR QL SCN: NEGATIVE
OPIATES UR QL SCN: NEGATIVE
PCP UR QL: NEGATIVE
PH UR: 6 [PH] (ref 3–11)
SPECIFIC GRAVITY, URINE AUTO (.000) (OHS): 1.03 (ref 1–1.03)

## 2024-04-19 PROCEDURE — 80307 DRUG TEST PRSMV CHEM ANLYZR: CPT | Performed by: EMERGENCY MEDICINE

## 2024-04-19 PROCEDURE — 21400001 HC TELEMETRY ROOM

## 2024-04-19 PROCEDURE — 63600175 PHARM REV CODE 636 W HCPCS

## 2024-04-19 PROCEDURE — 25000003 PHARM REV CODE 250

## 2024-04-19 PROCEDURE — 97116 GAIT TRAINING THERAPY: CPT

## 2024-04-19 PROCEDURE — 25000003 PHARM REV CODE 250: Performed by: NURSE PRACTITIONER

## 2024-04-19 PROCEDURE — 97166 OT EVAL MOD COMPLEX 45 MIN: CPT

## 2024-04-19 RX ORDER — DOXYCYCLINE HYCLATE 100 MG
100 TABLET ORAL EVERY 12 HOURS
Status: DISCONTINUED | OUTPATIENT
Start: 2024-04-19 | End: 2024-04-22 | Stop reason: HOSPADM

## 2024-04-19 RX ADMIN — POLYETHYLENE GLYCOL 3350 17 G: 17 POWDER, FOR SOLUTION ORAL at 09:04

## 2024-04-19 RX ADMIN — ACETAMINOPHEN 650 MG: 325 TABLET, FILM COATED ORAL at 01:04

## 2024-04-19 RX ADMIN — METHOCARBAMOL 750 MG: 750 TABLET ORAL at 09:04

## 2024-04-19 RX ADMIN — DOXYCYCLINE HYCLATE 100 MG: 100 TABLET, COATED ORAL at 09:04

## 2024-04-19 RX ADMIN — DOCUSATE SODIUM 100 MG: 100 CAPSULE, LIQUID FILLED ORAL at 09:04

## 2024-04-19 RX ADMIN — LEVETIRACETAM 500 MG: 500 TABLET, FILM COATED ORAL at 09:04

## 2024-04-19 RX ADMIN — METHOCARBAMOL 750 MG: 750 TABLET ORAL at 01:04

## 2024-04-19 RX ADMIN — ENOXAPARIN SODIUM 40 MG: 40 INJECTION SUBCUTANEOUS at 09:04

## 2024-04-19 RX ADMIN — KETOROLAC TROMETHAMINE 15 MG: 30 INJECTION, SOLUTION INTRAMUSCULAR at 11:04

## 2024-04-19 RX ADMIN — ACETAMINOPHEN 650 MG: 325 TABLET, FILM COATED ORAL at 09:04

## 2024-04-19 RX ADMIN — KETOROLAC TROMETHAMINE 15 MG: 30 INJECTION, SOLUTION INTRAMUSCULAR at 01:04

## 2024-04-19 NOTE — PLAN OF CARE
[facility] is wanting to move forward on referral. They are waiting on OT notes. OT ordered yesterday. Communication sent to OT inquiring when notes will be available.
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
## 2024-04-19 NOTE — PLAN OF CARE
Problem: Adult Inpatient Plan of Care  Goal: Plan of Care Review  Outcome: Ongoing, Progressing  Flowsheets (Taken 4/19/2024 0800)  Plan of Care Reviewed With: patient  Goal: Patient-Specific Goal (Individualized)  Outcome: Ongoing, Progressing  Goal: Absence of Hospital-Acquired Illness or Injury  Outcome: Ongoing, Progressing  Intervention: Identify and Manage Fall Risk  Flowsheets (Taken 4/19/2024 0800)  Safety Promotion/Fall Prevention:   assistive device/personal item within reach   bed alarm set   commode/urinal/bedpan at bedside   Fall Risk signage in place   nonskid shoes/socks when out of bed   instructed to call staff for mobility   side rails raised x 3   supervised activity  Intervention: Prevent Skin Injury  Flowsheets (Taken 4/19/2024 0800)  Body Position: position changed independently  Intervention: Prevent and Manage VTE (Venous Thromboembolism) Risk  Flowsheets (Taken 4/19/2024 0800)  Activity Management:   Arm raise - L1   Ankle pumps - L1   Rolling - L1  Range of Motion: active ROM (range of motion) encouraged  Intervention: Prevent Infection  Flowsheets (Taken 4/19/2024 0800)  Infection Prevention: rest/sleep promoted  Goal: Optimal Comfort and Wellbeing  Outcome: Ongoing, Progressing  Intervention: Monitor Pain and Promote Comfort  Flowsheets (Taken 4/19/2024 0800)  Pain Management Interventions: care clustered  Intervention: Provide Person-Centered Care  Flowsheets (Taken 4/19/2024 0800)  Trust Relationship/Rapport:   care explained   reassurance provided   choices provided   thoughts/feelings acknowledged   empathic listening provided   questions answered   questions encouraged   emotional support provided  Goal: Readiness for Transition of Care  Outcome: Ongoing, Progressing     Problem: Skin Injury Risk Increased  Goal: Skin Health and Integrity  Outcome: Ongoing, Progressing  Intervention: Optimize Skin Protection  Flowsheets (Taken 4/19/2024 0800)  Pressure Reduction Techniques:  frequent weight shift encouraged  Head of Bed (HOB) Positioning: HOB flat

## 2024-04-19 NOTE — PLAN OF CARE
Problem: Occupational Therapy  Goal: Occupational Therapy Goal  Description: Goals to be met by: 5/19/24     Patient will increase functional independence with ADLs by performing:    UE Dressing with Stand-by Assistance.  LE Dressing with Stand-by Assistance.  Grooming while standing at sink with Stand-by Assistance.  Toileting from toilet with Stand-by Assistance for hygiene and clothing management.   Toilet transfer to toilet with Stand-by Assistance.  Increased functional strength to 5/5 in BUEs for ADLs.  Outcome: Ongoing, Progressing

## 2024-04-20 PROCEDURE — 25000003 PHARM REV CODE 250: Performed by: NURSE PRACTITIONER

## 2024-04-20 PROCEDURE — 21400001 HC TELEMETRY ROOM

## 2024-04-20 PROCEDURE — 63600175 PHARM REV CODE 636 W HCPCS

## 2024-04-20 PROCEDURE — 25000003 PHARM REV CODE 250

## 2024-04-20 RX ADMIN — ACETAMINOPHEN 650 MG: 325 TABLET, FILM COATED ORAL at 12:04

## 2024-04-20 RX ADMIN — DOCUSATE SODIUM 100 MG: 100 CAPSULE, LIQUID FILLED ORAL at 09:04

## 2024-04-20 RX ADMIN — ACETAMINOPHEN 650 MG: 325 TABLET, FILM COATED ORAL at 09:04

## 2024-04-20 RX ADMIN — DOXYCYCLINE HYCLATE 100 MG: 100 TABLET, COATED ORAL at 09:04

## 2024-04-20 RX ADMIN — DOCUSATE SODIUM 100 MG: 100 CAPSULE, LIQUID FILLED ORAL at 08:04

## 2024-04-20 RX ADMIN — METHOCARBAMOL 750 MG: 750 TABLET ORAL at 02:04

## 2024-04-20 RX ADMIN — LEVETIRACETAM 500 MG: 500 TABLET, FILM COATED ORAL at 09:04

## 2024-04-20 RX ADMIN — METHOCARBAMOL 750 MG: 750 TABLET ORAL at 09:04

## 2024-04-20 RX ADMIN — LEVETIRACETAM 500 MG: 500 TABLET, FILM COATED ORAL at 08:04

## 2024-04-20 RX ADMIN — Medication 6 MG: at 09:04

## 2024-04-20 RX ADMIN — KETOROLAC TROMETHAMINE 15 MG: 30 INJECTION, SOLUTION INTRAMUSCULAR at 12:04

## 2024-04-20 RX ADMIN — DOXYCYCLINE HYCLATE 100 MG: 100 TABLET, COATED ORAL at 08:04
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
## 2024-04-20 NOTE — PLAN OF CARE
Problem: Adult Inpatient Plan of Care  Goal: Plan of Care Review  4/20/2024 1738 by Marilyn Powers RN  Outcome: Ongoing, Progressing  4/20/2024 1714 by Marilyn Powers RN  Outcome: Ongoing, Progressing  Goal: Patient-Specific Goal (Individualized)  4/20/2024 1738 by Marilyn Powers RN  Outcome: Ongoing, Progressing  4/20/2024 1714 by Marilyn Powers RN  Outcome: Ongoing, Progressing  Goal: Absence of Hospital-Acquired Illness or Injury  4/20/2024 1738 by Marilyn Powers RN  Outcome: Ongoing, Progressing  4/20/2024 1714 by Marilyn Powers RN  Outcome: Ongoing, Progressing  Goal: Optimal Comfort and Wellbeing  4/20/2024 1738 by Marilyn Powers RN  Outcome: Ongoing, Progressing  4/20/2024 1714 by Marilyn Powers RN  Outcome: Ongoing, Progressing  Goal: Readiness for Transition of Care  4/20/2024 1738 by Marilyn Powers RN  Outcome: Ongoing, Progressing  4/20/2024 1714 by Marilyn Powers RN  Outcome: Ongoing, Progressing     Problem: Skin Injury Risk Increased  Goal: Skin Health and Integrity  4/20/2024 1738 by Marilyn Powers RN  Outcome: Ongoing, Progressing  4/20/2024 1714 by Marilyn Powers RN  Outcome: Ongoing, Progressing
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
## 2024-04-21 PROCEDURE — 25000003 PHARM REV CODE 250: Performed by: NURSE PRACTITIONER

## 2024-04-21 PROCEDURE — 63600175 PHARM REV CODE 636 W HCPCS

## 2024-04-21 PROCEDURE — 21400001 HC TELEMETRY ROOM

## 2024-04-21 PROCEDURE — 97535 SELF CARE MNGMENT TRAINING: CPT | Mod: CO

## 2024-04-21 PROCEDURE — 25000003 PHARM REV CODE 250

## 2024-04-21 RX ORDER — LORAZEPAM 2 MG/ML
INJECTION INTRAMUSCULAR
Status: COMPLETED
Start: 2024-04-21 | End: 2024-04-21

## 2024-04-21 RX ORDER — HALOPERIDOL 5 MG/ML
5 INJECTION INTRAMUSCULAR EVERY 6 HOURS PRN
Status: DISCONTINUED | OUTPATIENT
Start: 2024-04-21 | End: 2024-04-22

## 2024-04-21 RX ORDER — DIPHENHYDRAMINE HYDROCHLORIDE 50 MG/ML
25 INJECTION INTRAMUSCULAR; INTRAVENOUS EVERY 4 HOURS PRN
Status: DISCONTINUED | OUTPATIENT
Start: 2024-04-21 | End: 2024-04-22 | Stop reason: HOSPADM

## 2024-04-21 RX ORDER — DIPHENHYDRAMINE HYDROCHLORIDE 50 MG/ML
25 INJECTION INTRAMUSCULAR; INTRAVENOUS EVERY 6 HOURS PRN
Status: CANCELLED | OUTPATIENT
Start: 2024-04-21

## 2024-04-21 RX ADMIN — Medication 6 MG: at 07:04

## 2024-04-21 RX ADMIN — DOCUSATE SODIUM 100 MG: 100 CAPSULE, LIQUID FILLED ORAL at 08:04

## 2024-04-21 RX ADMIN — HALOPERIDOL LACTATE 5 MG: 5 INJECTION, SOLUTION INTRAMUSCULAR at 02:04

## 2024-04-21 RX ADMIN — METHOCARBAMOL 750 MG: 750 TABLET ORAL at 07:04

## 2024-04-21 RX ADMIN — LORAZEPAM: 2 INJECTION INTRAMUSCULAR; INTRAVENOUS at 02:04

## 2024-04-21 RX ADMIN — LEVETIRACETAM 500 MG: 500 TABLET, FILM COATED ORAL at 08:04

## 2024-04-21 RX ADMIN — OXYCODONE HYDROCHLORIDE 5 MG: 5 TABLET ORAL at 07:04

## 2024-04-21 RX ADMIN — DOXYCYCLINE HYCLATE 100 MG: 100 TABLET, COATED ORAL at 08:04

## 2024-04-21 RX ADMIN — DOXYCYCLINE HYCLATE 100 MG: 100 TABLET, COATED ORAL at 07:04

## 2024-04-21 RX ADMIN — LEVETIRACETAM 500 MG: 500 TABLET, FILM COATED ORAL at 07:04
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
## 2024-04-21 NOTE — PLAN OF CARE
Problem: Adult Inpatient Plan of Care  Goal: Plan of Care Review  Outcome: Ongoing, Progressing  Goal: Patient-Specific Goal (Individualized)  Outcome: Ongoing, Progressing  Goal: Absence of Hospital-Acquired Illness or Injury  Outcome: Ongoing, Progressing  Goal: Optimal Comfort and Wellbeing  Outcome: Ongoing, Progressing  Goal: Readiness for Transition of Care  Outcome: Ongoing, Progressing     Problem: Skin Injury Risk Increased  Goal: Skin Health and Integrity  Outcome: Ongoing, Progressing     Problem: Fall Injury Risk  Goal: Absence of Fall and Fall-Related Injury  Outcome: Ongoing, Progressing     Problem: Restraint, Behavioral (Acute Care)  Goal: Absence of Harm or Injury  Outcome: Ongoing, Progressing  Intervention: Implement Least Restrictive Safety Strategies  Flowsheets (Taken 4/21/2024 5659)  Medical Device Protection: IV pole/bag removed from visual field  Less Restrictive Alternative:   1:1 observation maintained   emotional support provided   safety enhancements provided   security enhancements provided  De-Escalation Techniques:   1:1 observation initiated   increased round frequency   reoriented   stimulation decreased   diversional activity encouraged  Intervention: Education  Flowsheets (Taken 4/21/2024 0202)  Criteria Explained: Yes  Patient's Response: NR  Patient / Family Notification: (alexandria(aunt), rachell (brother))   Patient   Family (who - see comment)   Other (comment)  Patient / Family Teaching: Need for restraint

## 2024-04-21 NOTE — PLAN OF CARE
Problem: Restraint, Behavioral (Acute Care)  Goal: Absence of Harm or Injury  Intervention: Implement Least Restrictive Safety Strategies  Flowsheets (Taken 4/21/2024 3800)  Medical Device Protection: IV pole/bag removed from visual field  Less Restrictive Alternative:   1:1 observation maintained   emotional support provided   safety enhancements provided   security enhancements provided  De-Escalation Techniques:   1:1 observation initiated   increased round frequency   reoriented   stimulation decreased   diversional activity encouraged  Intervention: Education  Flowsheets (Taken 4/21/2024 6908)  Criteria Explained: Yes  Patient's Response: NR  Patient / Family Notification: (alexandria(aunt), rachell (brother))   Patient   Family (who - see comment)   Other (comment)  Patient / Family Teaching: Need for restraint

## 2024-04-22 VITALS
HEART RATE: 89 BPM | HEIGHT: 67 IN | DIASTOLIC BLOOD PRESSURE: 85 MMHG | OXYGEN SATURATION: 98 % | RESPIRATION RATE: 18 BRPM | WEIGHT: 138 LBS | TEMPERATURE: 98 F | BODY MASS INDEX: 21.66 KG/M2 | SYSTOLIC BLOOD PRESSURE: 116 MMHG

## 2024-04-22 PROCEDURE — 25000003 PHARM REV CODE 250: Performed by: NURSE PRACTITIONER

## 2024-04-22 PROCEDURE — 25000003 PHARM REV CODE 250

## 2024-04-22 PROCEDURE — 97116 GAIT TRAINING THERAPY: CPT | Mod: CQ

## 2024-04-22 PROCEDURE — 97535 SELF CARE MNGMENT TRAINING: CPT

## 2024-04-22 PROCEDURE — 99238 HOSP IP/OBS DSCHRG MGMT 30/<: CPT | Mod: ,,, | Performed by: SURGERY

## 2024-04-22 RX ORDER — ESCITALOPRAM OXALATE 10 MG/1
10 TABLET ORAL DAILY
Start: 2024-04-23 | End: 2025-04-23

## 2024-04-22 RX ORDER — LEVETIRACETAM 500 MG/1
500 TABLET ORAL 2 TIMES DAILY
Qty: 4 TABLET | Refills: 0 | Status: SHIPPED | OUTPATIENT
Start: 2024-04-22 | End: 2024-04-24

## 2024-04-22 RX ORDER — OLANZAPINE 10 MG/2ML
10 INJECTION, POWDER, FOR SOLUTION INTRAMUSCULAR EVERY 8 HOURS PRN
Start: 2024-04-22 | End: 2025-04-22

## 2024-04-22 RX ORDER — OLANZAPINE 10 MG/2ML
10 INJECTION, POWDER, FOR SOLUTION INTRAMUSCULAR EVERY 8 HOURS PRN
Status: DISCONTINUED | OUTPATIENT
Start: 2024-04-22 | End: 2024-04-22 | Stop reason: HOSPADM

## 2024-04-22 RX ORDER — DOXYCYCLINE HYCLATE 100 MG
100 TABLET ORAL EVERY 12 HOURS
Start: 2024-04-22 | End: 2024-04-28

## 2024-04-22 RX ORDER — ESCITALOPRAM OXALATE 10 MG/1
10 TABLET ORAL DAILY
Status: DISCONTINUED | OUTPATIENT
Start: 2024-04-22 | End: 2024-04-22 | Stop reason: HOSPADM

## 2024-04-22 RX ADMIN — OXYCODONE HYDROCHLORIDE 5 MG: 5 TABLET ORAL at 12:04

## 2024-04-22 RX ADMIN — METHOCARBAMOL 750 MG: 750 TABLET ORAL at 05:04

## 2024-04-22 RX ADMIN — DOXYCYCLINE HYCLATE 100 MG: 100 TABLET, COATED ORAL at 08:04

## 2024-04-22 RX ADMIN — LEVETIRACETAM 500 MG: 500 TABLET, FILM COATED ORAL at 08:04

## 2024-04-22 RX ADMIN — ESCITALOPRAM OXALATE 10 MG: 10 TABLET ORAL at 12:04

## 2024-04-22 NOTE — NURSING
Pt is alert and orient x 4 at the time of discharge. Pt is being transported to [facility] via [facility]. Ppw is given to spd staff and copy of discharge ppw is given to pt. Iv is removed prior to discharge
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
## 2024-04-22 NOTE — PLAN OF CARE
04/22/24 1157   Discharge Assessment   Assessment Type Discharge Planning Reassessment   Discharge Plan A Psychiatric hospital   Discharge Plan B Psychiatric hospital     Pt CEC 4/22/24@ 1030am. Fely OCHOA states she called [facility] to state pt is medically ready for Inpt placement.

## 2024-04-22 NOTE — PLAN OF CARE
Jazzy  updated that [facility] says pt is medically cleared for inpt psych. Masood notified of above  Voice message left for his brother Otis Olviares to call me

## 2024-04-22 NOTE — PLAN OF CARE
Problem: Occupational Therapy  Goal: Occupational Therapy Goal  Description: Goals to be met by: 5/19/24     Patient will increase functional independence with ADLs by performing:    UE Dressing with Stand-by Assistance.  LE Dressing with Stand-by Assistance.  Grooming while standing at sink with Stand-by Assistance.  Toileting from toilet with Stand-by Assistance for hygiene and clothing management.   Toilet transfer to toilet with Stand-by Assistance.  Increased functional strength to 5/5 in BUEs for ADLs.  Outcome: Met
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
## 2024-04-22 NOTE — NURSING
Pt has no pcp listed. Number was called to get a pcp. [facility] will call back with f/u info.
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
## 2024-04-22 NOTE — PT/OT/SLP PROGRESS
Physical Therapy Treatment    Patient Name:  Jun Mendoza   MRN:  [id number]    Recommendations:     Discharge therapy intensity: No Therapy Indicated   Discharge Equipment Recommendations: to be determined by next level of care  Barriers to discharge: no mobilily deficits that would prevent pt from leaving but psych assessing now so patient may have other medical need that would warrant additional services.     Assessment:     Jun Mendoza is a 28 y.o. male.  He presents with the following impairments/functional limitations: weakness, impaired endurance, impaired balance, pain, decreased safety awareness, impaired self care skills, impaired functional mobility, gait instability, decreased coordination, decreased upper extremity function, decreased lower extremity function.    Rehab Prognosis: Good; patient would benefit from acute skilled PT services to address these deficits and reach maximum level of function.    Recent Surgery: * No surgery found *      Plan:     During this hospitalization, patient would benefit from acute PT services 5 x/week to address the identified rehab impairments via gait training, therapeutic activities, therapeutic exercises and progress toward the following goals:    Plan of Care Expires:  05/08/24    Subjective     Chief Complaint: back pain    Objective:     Communicated with nurse prior to session.  Patient found ambulatory in room/danielle with   upon PT entry to room.     General Precautions: Standard, fall  Orthopedic Precautions: N/A  Braces: N/A  Respiratory Status: Room air  Blood Pressure: 116/82  Skin Integrity: Visible skin intact      Functional Mobility:  Pt ambulating in room with one to one present  Gait >400 ft without AD while holding coffee. No LOB and no knee buckle. Pt does not need any assistance with mobility and PT to assess for discharge.   Status change in discharge recommendation due to patient ambulating independently.     Education Provided:  Role and goals of PT,  transfer training, bed mobility, gait training, balance training, safety awareness, assistive device, strengthening exercises, and importance of participating in PT to return to PLOF.    Patient left ambulatory in room/danielle with  one to one present    GOALS:   Multidisciplinary Problems       Physical Therapy Goals          Problem: Physical Therapy    Goal Priority Disciplines Outcome Goal Variances Interventions   Physical Therapy Goal     PT, PT/OT Ongoing, Progressing     Description: Pt will be seen for the following goals  1. Pt will ambulate with a rw cynthia for 50ft  2. Pt will be sba with bed mobility  3. Pt will be cynthia with sit to stand to a rw                       Time Tracking:       Billable Minutes: Gait Training 10    Treatment Type: Treatment  PT/PTA: PTA     Number of PTA visits since last PT visit: 3     04/22/2024

## 2024-04-22 NOTE — PT/OT/SLP DISCHARGE
Discussed this patient with PTA and this read his PT note today. I am recommending DC from PT since he has met all goals and no longer requires skilled PT. I will discharge him now

## 2024-04-22 NOTE — PLAN OF CARE
Problem: Adult Inpatient Plan of Care  Goal: Plan of Care Review  Outcome: Ongoing, Progressing  Goal: Patient-Specific Goal (Individualized)  Outcome: Ongoing, Progressing  Goal: Absence of Hospital-Acquired Illness or Injury  Outcome: Ongoing, Progressing  Goal: Optimal Comfort and Wellbeing  Outcome: Ongoing, Progressing  Goal: Readiness for Transition of Care  Outcome: Ongoing, Progressing     Problem: Skin Injury Risk Increased  Goal: Skin Health and Integrity  Outcome: Ongoing, Progressing     Problem: Fall Injury Risk  Goal: Absence of Fall and Fall-Related Injury  Outcome: Ongoing, Progressing     Problem: Restraint, Behavioral (Acute Care)  Goal: Absence of Harm or Injury  Outcome: Ongoing, Progressing
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
## 2024-04-23 ENCOUNTER — HOSPITAL ENCOUNTER (INPATIENT)
Facility: HOSPITAL | Age: 29
LOS: 2 days | Discharge: LEFT BEFORE DISCHARGE | DRG: 084 | End: 2024-04-25
Attending: STUDENT IN AN ORGANIZED HEALTH CARE EDUCATION/TRAINING PROGRAM | Admitting: PSYCHIATRY & NEUROLOGY
Payer: MEDICAID

## 2024-04-23 DIAGNOSIS — M54.9 BACK PAIN, UNSPECIFIED BACK LOCATION, UNSPECIFIED BACK PAIN LATERALITY, UNSPECIFIED CHRONICITY: ICD-10-CM

## 2024-04-23 DIAGNOSIS — G95.19: Primary | ICD-10-CM

## 2024-04-23 DIAGNOSIS — S06.9X0D TRAUMATIC BRAIN INJURY, WITHOUT LOSS OF CONSCIOUSNESS, SUBSEQUENT ENCOUNTER: ICD-10-CM

## 2024-04-23 PROBLEM — F10.90 ALCOHOL USE DISORDER: Status: ACTIVE | Noted: 2024-04-23

## 2024-04-23 PROBLEM — F31.9 BIPOLAR 1 DISORDER: Status: ACTIVE | Noted: 2024-04-23

## 2024-04-23 PROCEDURE — 93010 ELECTROCARDIOGRAM REPORT: CPT | Mod: ,,, | Performed by: INTERNAL MEDICINE

## 2024-04-23 PROCEDURE — 63600175 PHARM REV CODE 636 W HCPCS: Performed by: EMERGENCY MEDICINE

## 2024-04-23 PROCEDURE — 85730 THROMBOPLASTIN TIME PARTIAL: CPT

## 2024-04-23 PROCEDURE — 80321 ALCOHOLS BIOMARKERS 1OR 2: CPT

## 2024-04-23 PROCEDURE — 85610 PROTHROMBIN TIME: CPT

## 2024-04-23 PROCEDURE — 25000003 PHARM REV CODE 250: Performed by: EMERGENCY MEDICINE

## 2024-04-23 PROCEDURE — 84443 ASSAY THYROID STIM HORMONE: CPT

## 2024-04-23 PROCEDURE — 99291 CRITICAL CARE FIRST HOUR: CPT | Mod: ,,,

## 2024-04-23 PROCEDURE — 83036 HEMOGLOBIN GLYCOSYLATED A1C: CPT

## 2024-04-23 PROCEDURE — 80061 LIPID PANEL: CPT

## 2024-04-23 PROCEDURE — 63600175 PHARM REV CODE 636 W HCPCS: Mod: JZ,JG

## 2024-04-23 PROCEDURE — 99285 EMERGENCY DEPT VISIT HI MDM: CPT | Mod: 25

## 2024-04-23 PROCEDURE — 81003 URINALYSIS AUTO W/O SCOPE: CPT

## 2024-04-23 PROCEDURE — 12000002 HC ACUTE/MED SURGE SEMI-PRIVATE ROOM

## 2024-04-23 PROCEDURE — 96372 THER/PROPH/DIAG INJ SC/IM: CPT | Performed by: EMERGENCY MEDICINE

## 2024-04-23 PROCEDURE — 93005 ELECTROCARDIOGRAM TRACING: CPT

## 2024-04-23 PROCEDURE — 86901 BLOOD TYPING SEROLOGIC RH(D): CPT

## 2024-04-23 PROCEDURE — 94761 N-INVAS EAR/PLS OXIMETRY MLT: CPT

## 2024-04-23 RX ORDER — OLANZAPINE 10 MG/2ML
10 INJECTION, POWDER, FOR SOLUTION INTRAMUSCULAR EVERY 8 HOURS PRN
Status: DISCONTINUED | OUTPATIENT
Start: 2024-04-24 | End: 2024-04-25 | Stop reason: HOSPADM

## 2024-04-23 RX ORDER — KETOROLAC TROMETHAMINE 30 MG/ML
30 INJECTION, SOLUTION INTRAMUSCULAR; INTRAVENOUS
Status: COMPLETED | OUTPATIENT
Start: 2024-04-23 | End: 2024-04-23

## 2024-04-23 RX ORDER — LIDOCAINE 50 MG/G
1 PATCH TOPICAL
Status: COMPLETED | OUTPATIENT
Start: 2024-04-23 | End: 2024-04-23

## 2024-04-23 RX ORDER — LORAZEPAM 2 MG/ML
1 INJECTION INTRAMUSCULAR ONCE AS NEEDED
Status: DISCONTINUED | OUTPATIENT
Start: 2024-04-23 | End: 2024-04-24

## 2024-04-23 RX ORDER — ONDANSETRON HYDROCHLORIDE 2 MG/ML
4 INJECTION, SOLUTION INTRAVENOUS EVERY 8 HOURS PRN
Status: DISCONTINUED | OUTPATIENT
Start: 2024-04-23 | End: 2024-04-25 | Stop reason: HOSPADM

## 2024-04-23 RX ORDER — SODIUM CHLORIDE 0.9 % (FLUSH) 0.9 %
10 SYRINGE (ML) INJECTION
Status: DISCONTINUED | OUTPATIENT
Start: 2024-04-23 | End: 2024-04-25 | Stop reason: HOSPADM

## 2024-04-23 RX ORDER — LORAZEPAM 2 MG/ML
1 INJECTION INTRAMUSCULAR
Status: DISCONTINUED | OUTPATIENT
Start: 2024-04-23 | End: 2024-04-23

## 2024-04-23 RX ORDER — LABETALOL HCL 20 MG/4 ML
10 SYRINGE (ML) INTRAVENOUS EVERY 6 HOURS PRN
Status: DISCONTINUED | OUTPATIENT
Start: 2024-04-23 | End: 2024-04-24

## 2024-04-23 RX ORDER — HYDROCODONE BITARTRATE AND ACETAMINOPHEN 5; 325 MG/1; MG/1
1 TABLET ORAL
Status: COMPLETED | OUTPATIENT
Start: 2024-04-23 | End: 2024-04-23

## 2024-04-23 RX ORDER — ACETAMINOPHEN 325 MG/1
650 TABLET ORAL EVERY 6 HOURS PRN
Status: DISCONTINUED | OUTPATIENT
Start: 2024-04-23 | End: 2024-04-25 | Stop reason: HOSPADM

## 2024-04-23 RX ORDER — POLYETHYLENE GLYCOL 3350 17 G/17G
17 POWDER, FOR SOLUTION ORAL DAILY
Status: DISCONTINUED | OUTPATIENT
Start: 2024-04-24 | End: 2024-04-25 | Stop reason: HOSPADM

## 2024-04-23 RX ORDER — ESCITALOPRAM OXALATE 10 MG/1
10 TABLET ORAL DAILY
Status: DISCONTINUED | OUTPATIENT
Start: 2024-04-24 | End: 2024-04-25 | Stop reason: HOSPADM

## 2024-04-23 RX ORDER — METHOCARBAMOL 500 MG/1
1000 TABLET, FILM COATED ORAL
Status: COMPLETED | OUTPATIENT
Start: 2024-04-23 | End: 2024-04-23

## 2024-04-23 RX ORDER — HYDRALAZINE HYDROCHLORIDE 20 MG/ML
10 INJECTION INTRAMUSCULAR; INTRAVENOUS EVERY 6 HOURS PRN
Status: DISCONTINUED | OUTPATIENT
Start: 2024-04-23 | End: 2024-04-24

## 2024-04-23 RX ORDER — AMOXICILLIN 250 MG
1 CAPSULE ORAL 2 TIMES DAILY
Status: DISCONTINUED | OUTPATIENT
Start: 2024-04-23 | End: 2024-04-25 | Stop reason: HOSPADM

## 2024-04-23 RX ADMIN — LIDOCAINE 5% 1 PATCH: 700 PATCH TOPICAL at 11:04

## 2024-04-23 RX ADMIN — HYDROCODONE BITARTRATE AND ACETAMINOPHEN 1 TABLET: 5; 325 TABLET ORAL at 09:04

## 2024-04-23 RX ADMIN — METHOCARBAMOL 1000 MG: 500 TABLET ORAL at 11:04

## 2024-04-23 RX ADMIN — LABETALOL HYDROCHLORIDE 10 MG: 5 INJECTION, SOLUTION INTRAVENOUS at 11:04

## 2024-04-23 RX ADMIN — KETOROLAC TROMETHAMINE 30 MG: 30 INJECTION, SOLUTION INTRAMUSCULAR; INTRAVENOUS at 11:04

## 2024-04-23 NOTE — ED NOTES
Patient identifiers verified and correct for Jun [facility]  C/C: back pain   APPEARANCE: awake and alert in NAD. PAIN  9/10  SKIN: warm, dry and intact. No breakdown or bruising. Scratches noted to lower back and both arms  MUSCULOSKELETAL: Patient moving all extremities spontaneously, no obvious swelling or deformities noted. States can't ambulate r/t pain  RESPIRATORY: Denies shortness of breath.Respirations unlabored.   CARDIAC: Denies CP, 2+ distal pulses; no peripheral edema  ABDOMEN: S/ND/NT, Denies nausea  : voids spontaneously, denies difficulty  Neurologic: AAO x 4; follows commands equal strength in all extremities; denies numbness/tingling. Denies dizziness

## 2024-04-23 NOTE — ED NOTES
Attempted to get pt out of bed to ambulate. Pt stiffens up and c/o of severe pain with changing positions. Pt returned to a lying position on the stretcher.
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
## 2024-04-23 NOTE — ED NOTES
Pt very agitated wanting to brush his teeth and leave the room. Pt demanding his cell phone. MD notified and med ordered. Security and charge spoke with pt and he was easily redirected. Med was not given.

## 2024-04-23 NOTE — ED NOTES
Pt informed of his PEC and CEC status and disposition. Pt does not agree and would like to leave and go home. Pt would like to speak with his provider. Dr Rojo notified.

## 2024-04-23 NOTE — ED NOTES
Resting in bed w/ eyes closed, rise/fall of chest noted. NAD, DVC maintained for safety. EDT &  Wing sitter present. Awaiting placement.
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
## 2024-04-23 NOTE — ED NOTES
Assumed pt care, he is lying on the stretcher resting NAD noted. Pt dressed in paper scrubs, all belongings secured outside of the room. Pt room scanned for hazards, EDT outside of the room DVC maintained. CPT working on pt disposition.

## 2024-04-24 ENCOUNTER — ANESTHESIA (OUTPATIENT)
Dept: INTERVENTIONAL RADIOLOGY/VASCULAR | Facility: HOSPITAL | Age: 29
DRG: 084 | End: 2024-04-24
Payer: MEDICAID

## 2024-04-24 PROBLEM — A74.9 CHLAMYDIA: Status: ACTIVE | Noted: 2024-04-24

## 2024-04-24 LAB
ABO + RH BLD: NORMAL
ALBUMIN SERPL BCP-MCNC: 4 G/DL (ref 3.5–5.2)
ALP SERPL-CCNC: 37 U/L (ref 55–135)
ALT SERPL W/O P-5'-P-CCNC: 16 U/L (ref 10–44)
ANION GAP SERPL CALC-SCNC: 12 MMOL/L (ref 8–16)
APTT PPP: 25.1 SEC (ref 21–32)
ASCENDING AORTA: 2.85 CM
AST SERPL-CCNC: 24 U/L (ref 10–40)
AV INDEX (PROSTH): 0.7
AV MEAN GRADIENT: 3 MMHG
AV PEAK GRADIENT: 5 MMHG
AV VALVE AREA BY VELOCITY RATIO: 2.09 CM²
AV VALVE AREA: 2.11 CM²
AV VELOCITY RATIO: 0.69
BASOPHILS # BLD AUTO: 0.01 K/UL (ref 0–0.2)
BASOPHILS NFR BLD: 0.2 % (ref 0–1.9)
BILIRUB SERPL-MCNC: 0.6 MG/DL (ref 0.1–1)
BILIRUB UR QL STRIP: NEGATIVE
BLD GP AB SCN CELLS X3 SERPL QL: NORMAL
BSA FOR ECHO PROCEDURE: 1.69 M2
BUN SERPL-MCNC: 18 MG/DL (ref 6–20)
CALCIUM SERPL-MCNC: 9.6 MG/DL (ref 8.7–10.5)
CHLORIDE SERPL-SCNC: 104 MMOL/L (ref 95–110)
CHOLEST SERPL-MCNC: 189 MG/DL (ref 120–199)
CHOLEST/HDLC SERPL: 5.7 {RATIO} (ref 2–5)
CLARITY UR REFRACT.AUTO: CLEAR
CO2 SERPL-SCNC: 22 MMOL/L (ref 23–29)
COLOR UR AUTO: YELLOW
CREAT SERPL-MCNC: 0.9 MG/DL (ref 0.5–1.4)
CV ECHO LV RWT: 0.45 CM
DIFFERENTIAL METHOD BLD: ABNORMAL
DOP CALC AO PEAK VEL: 1.11 M/S
DOP CALC AO VTI: 20.75 CM
DOP CALC LVOT AREA: 3 CM2
DOP CALC LVOT DIAMETER: 1.96 CM
DOP CALC LVOT PEAK VEL: 0.77 M/S
DOP CALC LVOT STROKE VOLUME: 43.76 CM3
DOP CALCLVOT PEAK VEL VTI: 14.51 CM
E WAVE DECELERATION TIME: 263.18 MSEC
E/A RATIO: 1.26
E/E' RATIO: 5.15 M/S
ECHO LV POSTERIOR WALL: 0.96 CM (ref 0.6–1.1)
EJECTION FRACTION: 58 %
EOSINOPHIL # BLD AUTO: 0.1 K/UL (ref 0–0.5)
EOSINOPHIL NFR BLD: 2.9 % (ref 0–8)
ERYTHROCYTE [DISTWIDTH] IN BLOOD BY AUTOMATED COUNT: 11.3 % (ref 11.5–14.5)
EST. GFR  (NO RACE VARIABLE): >60 ML/MIN/1.73 M^2
ESTIMATED AVG GLUCOSE: 105 MG/DL (ref 68–131)
FRACTIONAL SHORTENING: 33 % (ref 28–44)
GLUCOSE SERPL-MCNC: 119 MG/DL (ref 70–110)
GLUCOSE UR QL STRIP: NEGATIVE
HBA1C MFR BLD: 5.3 % (ref 4–5.6)
HCT VFR BLD AUTO: 40.6 % (ref 40–54)
HDLC SERPL-MCNC: 33 MG/DL (ref 40–75)
HDLC SERPL: 17.5 % (ref 20–50)
HGB BLD-MCNC: 13.6 G/DL (ref 14–18)
HGB UR QL STRIP: NEGATIVE
IMM GRANULOCYTES # BLD AUTO: 0.01 K/UL (ref 0–0.04)
IMM GRANULOCYTES NFR BLD AUTO: 0.2 % (ref 0–0.5)
INR PPP: 1.1 (ref 0.8–1.2)
INTERVENTRICULAR SEPTUM: 0.77 CM (ref 0.6–1.1)
KETONES UR QL STRIP: ABNORMAL
LA MAJOR: 4.38 CM
LA MINOR: 4.19 CM
LA WIDTH: 3.44 CM
LDLC SERPL CALC-MCNC: 142.6 MG/DL (ref 63–159)
LEFT ATRIUM SIZE: 2.06 CM
LEFT ATRIUM VOLUME INDEX: 15.2 ML/M2
LEFT ATRIUM VOLUME: 25.8 CM3
LEFT INTERNAL DIMENSION IN SYSTOLE: 2.86 CM (ref 2.1–4)
LEFT VENTRICLE DIASTOLIC VOLUME INDEX: 47.97 ML/M2
LEFT VENTRICLE DIASTOLIC VOLUME: 81.55 ML
LEFT VENTRICLE MASS INDEX: 68 G/M2
LEFT VENTRICLE SYSTOLIC VOLUME INDEX: 18.4 ML/M2
LEFT VENTRICLE SYSTOLIC VOLUME: 31.25 ML
LEFT VENTRICULAR INTERNAL DIMENSION IN DIASTOLE: 4.27 CM (ref 3.5–6)
LEFT VENTRICULAR MASS: 115.54 G
LEUKOCYTE ESTERASE UR QL STRIP: NEGATIVE
LV LATERAL E/E' RATIO: 4.79 M/S
LV SEPTAL E/E' RATIO: 5.58 M/S
LYMPHOCYTES # BLD AUTO: 2 K/UL (ref 1–4.8)
LYMPHOCYTES NFR BLD: 42.2 % (ref 18–48)
MAGNESIUM SERPL-MCNC: 2.1 MG/DL (ref 1.6–2.6)
MCH RBC QN AUTO: 29.6 PG (ref 27–31)
MCHC RBC AUTO-ENTMCNC: 33.5 G/DL (ref 32–36)
MCV RBC AUTO: 88 FL (ref 82–98)
MONOCYTES # BLD AUTO: 0.5 K/UL (ref 0.3–1)
MONOCYTES NFR BLD: 9.4 % (ref 4–15)
MV PEAK A VEL: 0.53 M/S
MV PEAK E VEL: 0.67 M/S
MV STENOSIS PRESSURE HALF TIME: 76.32 MS
MV VALVE AREA P 1/2 METHOD: 2.88 CM2
NEUTROPHILS # BLD AUTO: 2.2 K/UL (ref 1.8–7.7)
NEUTROPHILS NFR BLD: 45.1 % (ref 38–73)
NITRITE UR QL STRIP: NEGATIVE
NONHDLC SERPL-MCNC: 156 MG/DL
NRBC BLD-RTO: 0 /100 WBC
OHS CV RV/LV RATIO: 0.75 CM
OHS QRS DURATION: 80 MS
OHS QTC CALCULATION: 393 MS
PH UR STRIP: 6 [PH] (ref 5–8)
PHOSPHATE SERPL-MCNC: 3.7 MG/DL (ref 2.7–4.5)
PLATELET # BLD AUTO: 272 K/UL (ref 150–450)
PMV BLD AUTO: 8.9 FL (ref 9.2–12.9)
POTASSIUM SERPL-SCNC: 3.9 MMOL/L (ref 3.5–5.1)
PROT SERPL-MCNC: 7.5 G/DL (ref 6–8.4)
PROT UR QL STRIP: NEGATIVE
PROTHROMBIN TIME: 11.8 SEC (ref 9–12.5)
RA MAJOR: 3.63 CM
RA PRESSURE ESTIMATED: 3 MMHG
RA WIDTH: 3.59 CM
RBC # BLD AUTO: 4.6 M/UL (ref 4.6–6.2)
RIGHT VENTRICULAR END-DIASTOLIC DIMENSION: 3.2 CM
SINUS: 2.66 CM
SODIUM SERPL-SCNC: 138 MMOL/L (ref 136–145)
SP GR UR STRIP: 1.03 (ref 1–1.03)
SPECIMEN OUTDATE: NORMAL
STJ: 2.66 CM
TDI LATERAL: 0.14 M/S
TDI SEPTAL: 0.12 M/S
TDI: 0.13 M/S
TRICUSPID ANNULAR PLANE SYSTOLIC EXCURSION: 2.08 CM
TRIGL SERPL-MCNC: 67 MG/DL (ref 30–150)
TSH SERPL DL<=0.005 MIU/L-ACNC: 0.51 UIU/ML (ref 0.4–4)
URN SPEC COLLECT METH UR: ABNORMAL
WBC # BLD AUTO: 4.79 K/UL (ref 3.9–12.7)
Z-SCORE OF LEFT VENTRICULAR DIMENSION IN END DIASTOLE: -1.02
Z-SCORE OF LEFT VENTRICULAR DIMENSION IN END SYSTOLE: -0.18

## 2024-04-24 PROCEDURE — 84100 ASSAY OF PHOSPHORUS: CPT

## 2024-04-24 PROCEDURE — 63600175 PHARM REV CODE 636 W HCPCS

## 2024-04-24 PROCEDURE — 25500020 PHARM REV CODE 255: Performed by: PSYCHIATRY & NEUROLOGY

## 2024-04-24 PROCEDURE — B31L1ZZ FLUOROSCOPY OF INTERCOSTAL AND BRONCHIAL ARTERIES USING LOW OSMOLAR CONTRAST: ICD-10-PCS | Performed by: PSYCHIATRY & NEUROLOGY

## 2024-04-24 PROCEDURE — B3151ZZ FLUOROSCOPY OF BILATERAL COMMON CAROTID ARTERIES USING LOW OSMOLAR CONTRAST: ICD-10-PCS | Performed by: PSYCHIATRY & NEUROLOGY

## 2024-04-24 PROCEDURE — 25000003 PHARM REV CODE 250: Performed by: NURSE PRACTITIONER

## 2024-04-24 PROCEDURE — D9220A PRA ANESTHESIA: Mod: ANES,,, | Performed by: STUDENT IN AN ORGANIZED HEALTH CARE EDUCATION/TRAINING PROGRAM

## 2024-04-24 PROCEDURE — 36415 COLL VENOUS BLD VENIPUNCTURE: CPT | Performed by: PSYCHIATRY & NEUROLOGY

## 2024-04-24 PROCEDURE — 85025 COMPLETE CBC W/AUTO DIFF WBC: CPT

## 2024-04-24 PROCEDURE — 63600175 PHARM REV CODE 636 W HCPCS: Performed by: NURSE PRACTITIONER

## 2024-04-24 PROCEDURE — 99233 SBSQ HOSP IP/OBS HIGH 50: CPT | Mod: ,,, | Performed by: NURSE PRACTITIONER

## 2024-04-24 PROCEDURE — 25000003 PHARM REV CODE 250: Performed by: STUDENT IN AN ORGANIZED HEALTH CARE EDUCATION/TRAINING PROGRAM

## 2024-04-24 PROCEDURE — 83735 ASSAY OF MAGNESIUM: CPT

## 2024-04-24 PROCEDURE — 20000000 HC ICU ROOM

## 2024-04-24 PROCEDURE — D9220A PRA ANESTHESIA: Mod: CRNA,,, | Performed by: NURSE ANESTHETIST, CERTIFIED REGISTERED

## 2024-04-24 PROCEDURE — 90792 PSYCH DIAG EVAL W/MED SRVCS: CPT | Mod: AF,HB,, | Performed by: PSYCHIATRY & NEUROLOGY

## 2024-04-24 PROCEDURE — A9585 GADOBUTROL INJECTION: HCPCS | Performed by: PSYCHIATRY & NEUROLOGY

## 2024-04-24 PROCEDURE — 25000003 PHARM REV CODE 250: Performed by: NURSE ANESTHETIST, CERTIFIED REGISTERED

## 2024-04-24 PROCEDURE — 94761 N-INVAS EAR/PLS OXIMETRY MLT: CPT

## 2024-04-24 PROCEDURE — B3121ZZ FLUOROSCOPY OF LEFT SUBCLAVIAN ARTERY USING LOW OSMOLAR CONTRAST: ICD-10-PCS | Performed by: PSYCHIATRY & NEUROLOGY

## 2024-04-24 PROCEDURE — 99223 1ST HOSP IP/OBS HIGH 75: CPT | Mod: ,,, | Performed by: NEUROLOGICAL SURGERY

## 2024-04-24 PROCEDURE — 80053 COMPREHEN METABOLIC PANEL: CPT

## 2024-04-24 PROCEDURE — 25000003 PHARM REV CODE 250

## 2024-04-24 PROCEDURE — B31N1ZZ FLUOROSCOPY OF OTHER UPPER ARTERIES USING LOW OSMOLAR CONTRAST: ICD-10-PCS | Performed by: PSYCHIATRY & NEUROLOGY

## 2024-04-24 PROCEDURE — B31G1ZZ FLUOROSCOPY OF BILATERAL VERTEBRAL ARTERIES USING LOW OSMOLAR CONTRAST: ICD-10-PCS | Performed by: PSYCHIATRY & NEUROLOGY

## 2024-04-24 PROCEDURE — 63600175 PHARM REV CODE 636 W HCPCS: Performed by: NURSE ANESTHETIST, CERTIFIED REGISTERED

## 2024-04-24 RX ORDER — SODIUM CHLORIDE 0.9 % (FLUSH) 0.9 %
10 SYRINGE (ML) INJECTION
OUTPATIENT
Start: 2024-04-24

## 2024-04-24 RX ORDER — METHOCARBAMOL 500 MG/1
500 TABLET, FILM COATED ORAL 4 TIMES DAILY
Status: DISCONTINUED | OUTPATIENT
Start: 2024-04-24 | End: 2024-04-25

## 2024-04-24 RX ORDER — HYDRALAZINE HYDROCHLORIDE 20 MG/ML
10 INJECTION INTRAMUSCULAR; INTRAVENOUS EVERY 4 HOURS PRN
Status: DISCONTINUED | OUTPATIENT
Start: 2024-04-24 | End: 2024-04-24

## 2024-04-24 RX ORDER — LABETALOL HCL 20 MG/4 ML
10 SYRINGE (ML) INTRAVENOUS EVERY 4 HOURS PRN
Status: DISCONTINUED | OUTPATIENT
Start: 2024-04-24 | End: 2024-04-25 | Stop reason: HOSPADM

## 2024-04-24 RX ORDER — DEXAMETHASONE SODIUM PHOSPHATE 4 MG/ML
INJECTION, SOLUTION INTRA-ARTICULAR; INTRALESIONAL; INTRAMUSCULAR; INTRAVENOUS; SOFT TISSUE
Status: DISCONTINUED | OUTPATIENT
Start: 2024-04-24 | End: 2024-04-24

## 2024-04-24 RX ORDER — FENTANYL CITRATE 50 UG/ML
12.5 INJECTION, SOLUTION INTRAMUSCULAR; INTRAVENOUS ONCE
Status: COMPLETED | OUTPATIENT
Start: 2024-04-24 | End: 2024-04-24

## 2024-04-24 RX ORDER — HYDRALAZINE HYDROCHLORIDE 20 MG/ML
10 INJECTION INTRAMUSCULAR; INTRAVENOUS EVERY 4 HOURS PRN
Status: DISCONTINUED | OUTPATIENT
Start: 2024-04-24 | End: 2024-04-25 | Stop reason: HOSPADM

## 2024-04-24 RX ORDER — FENTANYL CITRATE 50 UG/ML
INJECTION, SOLUTION INTRAMUSCULAR; INTRAVENOUS
Status: DISCONTINUED | OUTPATIENT
Start: 2024-04-24 | End: 2024-04-24

## 2024-04-24 RX ORDER — DOXYCYCLINE HYCLATE 100 MG
100 TABLET ORAL EVERY 12 HOURS
Status: COMPLETED | OUTPATIENT
Start: 2024-04-24 | End: 2024-04-24

## 2024-04-24 RX ORDER — PROPOFOL 10 MG/ML
VIAL (ML) INTRAVENOUS
Status: DISCONTINUED | OUTPATIENT
Start: 2024-04-24 | End: 2024-04-24

## 2024-04-24 RX ORDER — MIDAZOLAM HYDROCHLORIDE 1 MG/ML
1 INJECTION, SOLUTION INTRAMUSCULAR; INTRAVENOUS ONCE
Status: COMPLETED | OUTPATIENT
Start: 2024-04-24 | End: 2024-04-24

## 2024-04-24 RX ORDER — SODIUM CHLORIDE 9 MG/ML
INJECTION, SOLUTION INTRAVENOUS CONTINUOUS
Status: DISCONTINUED | OUTPATIENT
Start: 2024-04-24 | End: 2024-04-25

## 2024-04-24 RX ORDER — CETIRIZINE HYDROCHLORIDE 10 MG/1
10 TABLET ORAL ONCE
Status: COMPLETED | OUTPATIENT
Start: 2024-04-24 | End: 2024-04-24

## 2024-04-24 RX ORDER — POTASSIUM CHLORIDE 7.45 MG/ML
80 INJECTION INTRAVENOUS
Status: DISCONTINUED | OUTPATIENT
Start: 2024-04-24 | End: 2024-04-25 | Stop reason: HOSPADM

## 2024-04-24 RX ORDER — LIDOCAINE HYDROCHLORIDE 20 MG/ML
INJECTION INTRAVENOUS
Status: DISCONTINUED | OUTPATIENT
Start: 2024-04-24 | End: 2024-04-24

## 2024-04-24 RX ORDER — DEXMEDETOMIDINE HYDROCHLORIDE 100 UG/ML
INJECTION, SOLUTION INTRAVENOUS
Status: DISCONTINUED | OUTPATIENT
Start: 2024-04-24 | End: 2024-04-24

## 2024-04-24 RX ORDER — NICARDIPINE HYDROCHLORIDE 0.2 MG/ML
0-15 INJECTION INTRAVENOUS CONTINUOUS
Status: DISCONTINUED | OUTPATIENT
Start: 2024-04-24 | End: 2024-04-24

## 2024-04-24 RX ORDER — MIDAZOLAM HYDROCHLORIDE 1 MG/ML
INJECTION INTRAMUSCULAR; INTRAVENOUS
Status: DISCONTINUED | OUTPATIENT
Start: 2024-04-24 | End: 2024-04-24

## 2024-04-24 RX ORDER — MAGNESIUM SULFATE HEPTAHYDRATE 40 MG/ML
4 INJECTION, SOLUTION INTRAVENOUS
Status: DISCONTINUED | OUTPATIENT
Start: 2024-04-24 | End: 2024-04-25 | Stop reason: HOSPADM

## 2024-04-24 RX ORDER — MEGESTROL ACETATE 40 MG/ML
400 SUSPENSION ORAL DAILY
COMMUNITY
Start: 2024-04-10

## 2024-04-24 RX ORDER — GADOBUTROL 604.72 MG/ML
6 INJECTION INTRAVENOUS
Status: COMPLETED | OUTPATIENT
Start: 2024-04-24 | End: 2024-04-24

## 2024-04-24 RX ORDER — ONDANSETRON HYDROCHLORIDE 2 MG/ML
INJECTION, SOLUTION INTRAVENOUS
Status: DISCONTINUED | OUTPATIENT
Start: 2024-04-24 | End: 2024-04-24

## 2024-04-24 RX ORDER — POTASSIUM CHLORIDE 7.45 MG/ML
40 INJECTION INTRAVENOUS
Status: DISCONTINUED | OUTPATIENT
Start: 2024-04-24 | End: 2024-04-25 | Stop reason: HOSPADM

## 2024-04-24 RX ORDER — MAGNESIUM SULFATE HEPTAHYDRATE 40 MG/ML
2 INJECTION, SOLUTION INTRAVENOUS
Status: DISCONTINUED | OUTPATIENT
Start: 2024-04-24 | End: 2024-04-25 | Stop reason: HOSPADM

## 2024-04-24 RX ORDER — IODIXANOL 320 MG/ML
115 INJECTION, SOLUTION INTRAVASCULAR
Status: COMPLETED | OUTPATIENT
Start: 2024-04-24 | End: 2024-04-24

## 2024-04-24 RX ORDER — LABETALOL HCL 20 MG/4 ML
10 SYRINGE (ML) INTRAVENOUS EVERY 4 HOURS PRN
Status: DISCONTINUED | OUTPATIENT
Start: 2024-04-24 | End: 2024-04-24

## 2024-04-24 RX ORDER — ROCURONIUM BROMIDE 10 MG/ML
INJECTION, SOLUTION INTRAVENOUS
Status: DISCONTINUED | OUTPATIENT
Start: 2024-04-24 | End: 2024-04-24

## 2024-04-24 RX ORDER — POTASSIUM CHLORIDE 7.45 MG/ML
60 INJECTION INTRAVENOUS
Status: DISCONTINUED | OUTPATIENT
Start: 2024-04-24 | End: 2024-04-25 | Stop reason: HOSPADM

## 2024-04-24 RX ORDER — ACETAMINOPHEN 325 MG/1
650 TABLET ORAL EVERY 8 HOURS
Status: DISCONTINUED | OUTPATIENT
Start: 2024-04-24 | End: 2024-04-25 | Stop reason: HOSPADM

## 2024-04-24 RX ORDER — SODIUM CHLORIDE 0.9 % (FLUSH) 0.9 %
10 SYRINGE (ML) INJECTION
Status: DISCONTINUED | OUTPATIENT
Start: 2024-04-24 | End: 2024-04-25 | Stop reason: HOSPADM

## 2024-04-24 RX ADMIN — ACETAMINOPHEN 650 MG: 325 TABLET ORAL at 09:04

## 2024-04-24 RX ADMIN — DEXMEDETOMIDINE 8 MCG: 100 INJECTION, SOLUTION, CONCENTRATE INTRAVENOUS at 11:04

## 2024-04-24 RX ADMIN — CETIRIZINE HYDROCHLORIDE 10 MG: 10 TABLET, FILM COATED ORAL at 09:04

## 2024-04-24 RX ADMIN — DOCUSATE SODIUM AND SENNOSIDES 1 TABLET: 8.6; 5 TABLET, FILM COATED ORAL at 08:04

## 2024-04-24 RX ADMIN — FENTANYL CITRATE 75 MCG: 50 INJECTION, SOLUTION INTRAMUSCULAR; INTRAVENOUS at 10:04

## 2024-04-24 RX ADMIN — METHOCARBAMOL 500 MG: 500 TABLET ORAL at 09:04

## 2024-04-24 RX ADMIN — ACETAMINOPHEN 650 MG: 325 TABLET ORAL at 12:04

## 2024-04-24 RX ADMIN — SODIUM CHLORIDE 500 ML: 9 INJECTION, SOLUTION INTRAVENOUS at 08:04

## 2024-04-24 RX ADMIN — MIDAZOLAM 1 MG: 1 INJECTION INTRAMUSCULAR; INTRAVENOUS at 03:04

## 2024-04-24 RX ADMIN — DEXAMETHASONE SODIUM PHOSPHATE 4 MG: 4 INJECTION, SOLUTION INTRAMUSCULAR; INTRAVENOUS at 10:04

## 2024-04-24 RX ADMIN — LIDOCAINE HYDROCHLORIDE 100 MG: 20 INJECTION INTRAVENOUS at 10:04

## 2024-04-24 RX ADMIN — DOXYCYCLINE HYCLATE 100 MG: 100 TABLET, FILM COATED ORAL at 09:04

## 2024-04-24 RX ADMIN — NICARDIPINE HYDROCHLORIDE 2.5 MG/HR: 0.2 INJECTION, SOLUTION INTRAVENOUS at 07:04

## 2024-04-24 RX ADMIN — HYDRALAZINE HYDROCHLORIDE 10 MG: 20 INJECTION, SOLUTION INTRAMUSCULAR; INTRAVENOUS at 02:04

## 2024-04-24 RX ADMIN — FENTANYL CITRATE 12.5 MCG: 50 INJECTION INTRAMUSCULAR; INTRAVENOUS at 02:04

## 2024-04-24 RX ADMIN — POTASSIUM CHLORIDE 10 MEQ: 7.46 INJECTION, SOLUTION INTRAVENOUS at 04:04

## 2024-04-24 RX ADMIN — ESCITALOPRAM OXALATE 10 MG: 10 TABLET ORAL at 08:04

## 2024-04-24 RX ADMIN — PROPOFOL 160 MG: 10 INJECTION, EMULSION INTRAVENOUS at 10:04

## 2024-04-24 RX ADMIN — ACETAMINOPHEN 650 MG: 325 TABLET ORAL at 02:04

## 2024-04-24 RX ADMIN — SODIUM CHLORIDE: 0.9 INJECTION, SOLUTION INTRAVENOUS at 10:04

## 2024-04-24 RX ADMIN — LABETALOL HYDROCHLORIDE 10 MG: 5 INJECTION, SOLUTION INTRAVENOUS at 01:04

## 2024-04-24 RX ADMIN — HYDRALAZINE HYDROCHLORIDE 10 MG: 20 INJECTION, SOLUTION INTRAMUSCULAR; INTRAVENOUS at 08:04

## 2024-04-24 RX ADMIN — ROCURONIUM BROMIDE 40 MG: 10 INJECTION, SOLUTION INTRAVENOUS at 10:04

## 2024-04-24 RX ADMIN — SUGAMMADEX 200 MG: 100 INJECTION, SOLUTION INTRAVENOUS at 11:04

## 2024-04-24 RX ADMIN — ONDANSETRON 4 MG: 2 INJECTION INTRAMUSCULAR; INTRAVENOUS at 11:04

## 2024-04-24 RX ADMIN — IODIXANOL 115 ML: 320 INJECTION, SOLUTION INTRAVASCULAR at 11:04

## 2024-04-24 RX ADMIN — DOXYCYCLINE HYCLATE 100 MG: 100 TABLET, FILM COATED ORAL at 08:04

## 2024-04-24 RX ADMIN — FENTANYL CITRATE 25 MCG: 50 INJECTION, SOLUTION INTRAMUSCULAR; INTRAVENOUS at 10:04

## 2024-04-24 RX ADMIN — DOXYCYCLINE HYCLATE 100 MG: 100 TABLET, FILM COATED ORAL at 12:04

## 2024-04-24 RX ADMIN — GADOBUTROL 6 ML: 604.72 INJECTION INTRAVENOUS at 05:04

## 2024-04-24 RX ADMIN — MIDAZOLAM HYDROCHLORIDE 2 MG: 1 INJECTION, SOLUTION INTRAMUSCULAR; INTRAVENOUS at 10:04

## 2024-04-24 RX ADMIN — HYDRALAZINE HYDROCHLORIDE 10 MG: 20 INJECTION, SOLUTION INTRAMUSCULAR; INTRAVENOUS at 06:04

## 2024-04-24 NOTE — PT/OT/SLP PROGRESS
Occupational Therapy      Patient Name:  Jun Mendoza   MRN:  [id number]    Patient not seen today secondary to pt with active bed rest orders until 2200 on 9/24. Pt also with scheduled spinal angiogram in AM on 9/24. OT orders received and acknowledged. Will follow up as appropriate for OT evaluation.     4/24/2024

## 2024-04-24 NOTE — PT/OT/SLP PROGRESS
Physical Therapy      Patient Name:  Jun Mendoza   MRN:  [id number]    Patient not seen today secondary to pt with active bed rest orders until 2200 on 9/24. Pt also with scheduled spinal angiogram in AM on 9/24. PT orders received and acknowledged. Will follow up as appropriate for PT evaluation.    4/24/2024

## 2024-04-24 NOTE — PLAN OF CARE
Pt entered the room on bed. Pt was able to transfer from bed to procedural table on his own. Pt is under the care of Anesthesia.

## 2024-04-24 NOTE — SUBJECTIVE & OBJECTIVE
Past Medical History:   Diagnosis Date    Alcohol abuse     Chlamydia      Past Surgical History:   Procedure Laterality Date    axillary gland        No current facility-administered medications on file prior to encounter.     Current Outpatient Medications on File Prior to Encounter   Medication Sig Dispense Refill    doxycycline (VIBRA-TABS) 100 MG tablet Take 1 tablet (100 mg total) by mouth every 12 (twelve) hours. for 4 days      EScitalopram oxalate (LEXAPRO) 10 MG tablet Take 1 tablet (10 mg total) by mouth once daily.      levETIRAcetam (KEPPRA) 500 MG Tab Take 1 tablet (500 mg total) by mouth 2 (two) times daily. for 2 days 4 tablet 0    OLANZapine (ZYPREXA) injection Inject 10 mg into the muscle every 8 (eight) hours as needed for Agitation.        Allergies: Penicillins  Family History   Problem Relation Name Age of Onset    Colon cancer Father      Cancer Maternal Grandmother       Social History     Tobacco Use    Smoking status: Never    Smokeless tobacco: Never   Substance Use Topics    Alcohol use: Not Currently     Comment: socially    Drug use: Never     Review of Systems   Constitutional:  Negative for chills and fever.   HENT:  Negative for trouble swallowing.    Eyes:  Negative for visual disturbance.   Respiratory:  Negative for shortness of breath.    Cardiovascular:  Negative for chest pain, palpitations and leg swelling.   Gastrointestinal:  Negative for abdominal pain, constipation, diarrhea, nausea and vomiting.   Genitourinary:  Negative for difficulty urinating.   Musculoskeletal:  Positive for back pain, neck pain and neck stiffness.   Neurological:  Positive for weakness, numbness and headaches. Negative for seizures, syncope and speech difficulty.   Psychiatric/Behavioral:  Positive for confusion and decreased concentration.      Objective:     Vitals:    Temp: 98.6 °F (37 °C)  Pulse: 71  BP: (!) 139/92  MAP (mmHg): 110  Resp: 16  SpO2: 99 %    Temp  Min: 98.1 °F (36.7 °C)  Max: 99  "°F (37.2 °C)  Pulse  Min: 70  Max: 89  BP  Min: 117/76  Max: 139/92  MAP (mmHg)  Min: 90  Max: 110  Resp  Min: 14  Max: 18  SpO2  Min: 97 %  Max: 100 %    No intake/output data recorded.            Physical Exam  Vitals and nursing note reviewed.   Constitutional:       General: He is not in acute distress.     Appearance: Normal appearance. He is normal weight.   HENT:      Head: Normocephalic.      Right Ear: External ear normal.      Left Ear: External ear normal.      Nose: Nose normal.      Mouth/Throat:      Mouth: Mucous membranes are dry.      Pharynx: Oropharynx is clear.   Eyes:      Extraocular Movements: Extraocular movements intact.      Conjunctiva/sclera: Conjunctivae normal.      Pupils: Pupils are equal, round, and reactive to light.   Cardiovascular:      Rate and Rhythm: Normal rate and regular rhythm.      Pulses: Normal pulses.   Pulmonary:      Effort: Pulmonary effort is normal. No respiratory distress.   Abdominal:      General: There is no distension.      Palpations: Abdomen is soft.      Tenderness: There is no abdominal tenderness.   Musculoskeletal:      Cervical back: Normal range of motion.      Right lower leg: No edema.      Left lower leg: No edema.   Skin:     General: Skin is warm and dry.      Capillary Refill: Capillary refill takes less than 2 seconds.   Neurological:      Mental Status: He is alert.      Comments: E4 V4 M6  Alert. Oriented x person, place, knows month but not year "2023". Flat affect. Speech delayed. No dysarthria or aphasia. Following commands.   CN II-XII grossly intact, specifically:  PERRL. EOMI.  Visual fields grossly intact   No facial asymmetry. Facial sensation intact in V1-V3.  Tongue midline. Shoulder shrug symmetric.  Motor:  RUE 5/5  LUE 5/5  Exam in BLE is limited by pain and requires excessive encouragement   RLE 3-/5 w drift  LLE 4-/5 w drift  Plantarflexion: R 3/5; L 4/5   Dorsiflexion: R 2/5; L 4/5  Appears to have R foot drop   Coordination " intact in BUE, pt will not try in BLE   Sensation mildly reduced to light touch in RLE compared to LLE   Psychiatric:         Attention and Perception: He is inattentive.         Mood and Affect: Affect is blunt.         Speech: Speech is delayed.         Behavior: Behavior is slowed.         Cognition and Memory: Memory is impaired. He exhibits impaired recent memory and impaired remote memory.              Today I personally reviewed pertinent medications, lines/drains/airways, imaging, cardiology results, laboratory results, microbiology results, notably:    CTH   No acute intracranial process with no detrimental change.  See above comments.  There is very minimal asymmetric hyperdensity along the left posterolateral tentorium compared to the right, seen on coronal 98 and sagittal 124. This is similar to a prior study and could represent minimal subdural hematoma versus minimal asymmetric tentorial thickening or calcification.  No detrimental change.  Recommend surveillance.    CT full spine   Unremarkable    MRI L spine   T12-L1: Unremarkable.  L1-2: Unremarkable.  L2-3: Unremarkable.  L3-4: Mild effacement of the thecal sac (09:26).  L4-5: Moderate effacement of the thecal sac, with abutment of several right descending sacral nerve roots (09:38).  L5-S1: Complete effacement of the thecal sac, with abutment of several sacral nerve roots bilaterally  Impression: Subacute spinal subarachnoid hemorrhage extending from L3-S2, with effacement of the thecal sac and abutment of several sacral nerve roots.

## 2024-04-24 NOTE — ANESTHESIA PROCEDURE NOTES
Intubation    Date/Time: 4/24/2024 10:32 AM    Performed by: Camilo Child CRNA  Authorized by: Caden Núñez MD    Intubation:     Induction:  Intravenous    Intubated:  Postinduction    Mask Ventilation:  Easy mask    Attempts:  1    Attempted By:  CRNA    Method of Intubation:  Video laryngoscopy    Blade:  Damon 3    Laryngeal View Grade: Grade IIA - cords partially seen      Difficult Airway Encountered?: No      Complications:  None    Airway Device:  Oral endotracheal tube    Airway Device Size:  7.0    Style/Cuff Inflation:  Cuffed    Inflation Amount (mL):  10    Tube secured:  20    Secured at:  The teeth    Placement Verified By:  Capnometry    Complicating Factors:  None    Findings Post-Intubation:  BS equal bilateral and atraumatic/condition of teeth unchanged

## 2024-04-24 NOTE — NURSING
Pt arrived back to room following MRI        Pt was escorted by RN on cardiac monitoring, O2, and ambu bag.        Patient placed back on bedside monitor with alarms audible, bed in low position with bed alarm on, call light within reach. HILDA.
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
## 2024-04-24 NOTE — NURSING
Initiated PRN potassium IVPB per order.  Pt unable to tolerate 2/2 to burning.  Attempted to run  potassium y-sited to IVF to dilute and decreased rate to 50ml/hr.  Pt still unable to tolerate 2/2 to burning. Pt refusing further attempts. D/c'd the IVF and K+ and flushed PIV. No infiltration noted.

## 2024-04-24 NOTE — PLAN OF CARE
Pt had a spinal angiogram procedure. Pt under the care of anesthesia, VS suggest pt tolerated procedure well.

## 2024-04-24 NOTE — ASSESSMENT & PLAN NOTE
Pt does not recall out patient medications. Not able to provide further information.  CEC obtained on 4/22/24     Continue CEC  Psychiatry consult

## 2024-04-24 NOTE — ASSESSMENT & PLAN NOTE
Reportedly drinks a fifth per day. Last drink on 4/15. Denies sx of withdrawal. EtOH <10.  PEtH pending
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
## 2024-04-24 NOTE — TRANSFER OF CARE
"Anesthesia Transfer of Care Note    Patient: Jun Mendoza    Procedure(s) Performed: * No procedures listed *    Patient location: ICU    Anesthesia Type: general    Transport from OR: Transported from OR on room air with adequate spontaneous ventilation    Post pain: adequate analgesia    Post assessment: no apparent anesthetic complications and tolerated procedure well    Post vital signs: stable    Level of consciousness: awake and alert    Nausea/Vomiting: no nausea/vomiting    Complications: none    Transfer of care protocol was followed    Last vitals: Visit Vitals  /75   Pulse 104   Temp (!) 35.3 °C (95.5 °F)   Resp 20   Ht 5' 7" (1.702 m)   Wt 60.3 kg (133 lb)   SpO2 96%   BMI 20.83 kg/m²     "

## 2024-04-24 NOTE — PT/OT/SLP PROGRESS
Speech Language Pathology      Jungrace Mendoza  MRN: [id number]    Patient not seen today secondary to Other (Comment), Nursing hold (Comment) (npo for procedure). Will follow-up 4/25.

## 2024-04-24 NOTE — PROCEDURES
Interventional Neuroradiology Post-Procedure Note    Pre Op Diagnosis: Lumbosacral subdural blood collection    Post Op Diagnosis: Same    Procedure: Diagnostic spinal angiogram    Procedure performed by: Dayne MURRAY, Gaston; Ashley MURRAY, Eddie; Derrell MURRAY, Po    Written Informed Consent Obtained: Yes    Specimen Removed: NO    Estimated Blood Loss: Minimal    Procedure report:     A 5F sheath was placed into the right femoral artery and a 5F Cobra C2 catheter was advanced into the aorta.  The bilateral common iliac arteries, segmental arteries from T3-L5, supreme intercostal arteries, bilateral subclavian arteries, bilateral vertebral arteries and bilateral common carotid were subselected and angiography of the brain was performed after injection into each of these vessels.    Preliminary interpretation: Negative angiogram for any spinal vascular pathologies.  Please see Imaging report for full details.    A right femoral artery angiogram was performed, the sheath removed and hemostasis achieved using 5F Vascade.  No hematoma was present at the time of hemostasis.    The patient tolerated the procedure well.     Plan:  -To [facility] for monitoring  -Bed rest for 2h  -Groin check and pulse check q2h   -Avoid carrying heavy weights > 10 lbs x 24 hrs   -Remove groin dressing tomorrow                       Eddie Ramirez MD, MHA  Fellow, NeuroEndovascular Surgery, [facility] [place] [facility]  Neurologist, [facility] [place], [place]
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
## 2024-04-24 NOTE — CONSULTS
Inpatient consult to Physical Medicine Rehab  Consult performed by: Courtney Gotti NP  Consult ordered by: Rodriguez Herrera PA-C  Reason for consult: Rehab      Consult received.     HELENA Menard, FNP-C  Physical Medicine & Rehabilitation   04/24/2024
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
## 2024-04-24 NOTE — SUBJECTIVE & OBJECTIVE
Medications Prior to Admission   Medication Sig Dispense Refill Last Dose    doxycycline (VIBRA-TABS) 100 MG tablet Take 1 tablet (100 mg total) by mouth every 12 (twelve) hours. for 4 days       EScitalopram oxalate (LEXAPRO) 10 MG tablet Take 1 tablet (10 mg total) by mouth once daily.       levETIRAcetam (KEPPRA) 500 MG Tab Take 1 tablet (500 mg total) by mouth 2 (two) times daily. for 2 days 4 tablet 0     OLANZapine (ZYPREXA) injection Inject 10 mg into the muscle every 8 (eight) hours as needed for Agitation.          Review of patient's allergies indicates:   Allergen Reactions    Penicillins Other (See Comments)     Tolerated rocephin  unknown       Past Medical History:   Diagnosis Date    Alcohol abuse     Chlamydia      Past Surgical History:   Procedure Laterality Date    axillary gland       Family History       Problem Relation (Age of Onset)    Cancer Maternal Grandmother    Colon cancer Father          Tobacco Use    Smoking status: Never    Smokeless tobacco: Never   Substance and Sexual Activity    Alcohol use: Not Currently     Comment: socially    Drug use: Never    Sexual activity: Yes     Partners: Female     Birth control/protection: None     Review of Systems   Constitutional:  Negative for chills and fever.   HENT:  Negative for rhinorrhea and sore throat.    Eyes:  Negative for pain.   Respiratory:  Negative for cough and shortness of breath.    Cardiovascular:  Negative for chest pain and palpitations.   Gastrointestinal:  Negative for abdominal pain, constipation, nausea and vomiting.   Genitourinary:  Negative for dysuria, frequency and hematuria.   Musculoskeletal:  Negative for back pain and neck pain.   Skin:  Negative for pallor and rash.   Neurological:  Negative for seizures, facial asymmetry, weakness, numbness and headaches.     Objective:     Weight: 60.5 kg (133 lb 6.1 oz)  Body mass index is 20.89 kg/m².  Vital Signs (Most Recent):  Temp: 99.4 °F (37.4 °C) (04/24/24  0301)  Pulse: 67 (04/24/24 0312)  Resp: 17 (04/24/24 0312)  BP: 120/79 (04/24/24 0300)  SpO2: 98 % (04/24/24 0312) Vital Signs (24h Range):  Temp:  [98.1 °F (36.7 °C)-99.7 °F (37.6 °C)] 99.4 °F (37.4 °C)  Pulse:  [64-89] 67  Resp:  [14-35] 17  SpO2:  [97 %-99 %] 98 %  BP: (114-139)/(65-92) 120/79                                 Physical Exam         Neurosurgery Physical Exam    Neurosurgery Physical Exam    General: well developed, well nourished, no distress.   HEENT: normocephalic, atraumatic  CV: regular rate   Pulmonary: normal respirations, no signs of respiratory distress  Abdomen: soft, non-distended, not tender to palpation  Skin: Skin is warm, dry and intact  Heme: no bruising    Neuro:   Mental Status: AO x4  CN II-XII intact  Sensory: intact to light touch throughout  Motor:    Strength   Deltoids Biceps Triceps Wrist Extension Wrist Flexion    Upper: R 5/5 5/5 5/5 5/5 5/5 5/5     L 5/5 5/5 5/5 5/5 5/5 5/5       Hip Flex Knee Ext Knee Flex DF PF EHL   Lower: R 4/5 5/5  4/5 5/5 4/5     L 4/5 5/5  4+/5 5/5 4+/5   Reflexes: -Stewart's, -Babinski, no clonus. Patellar: 2+ bilaterally   Gait: deferred  Back: midline TTP lumbar        Significant Labs:  Recent Labs   Lab 04/22/24 2003 04/24/24  0207   GLU 81 119*    138   K 3.7 3.9    104   CO2 22* 22*   BUN 14 18   CREATININE 0.8 0.9   CALCIUM 9.4 9.6   MG  --  2.1     Recent Labs   Lab 04/22/24 2003 04/24/24  0207   WBC 6.09 4.79   HGB 13.9* 13.6*   HCT 41.0 40.6    272     Recent Labs   Lab 04/23/24  2352   INR 1.1   APTT 25.1     Microbiology Results (last 7 days)       ** No results found for the last 168 hours. **          All pertinent labs from the last 24 hours have been reviewed.    Significant Diagnostics:  CT: No results found in the last 24 hours.  MRI: No results found in the last 24 hours.

## 2024-04-24 NOTE — ASSESSMENT & PLAN NOTE
Pt does not recall out patient medications. Not able to provide further information.  CEC obtained on 4/22/24   Discontinued CEC  Psychiatry following

## 2024-04-24 NOTE — ASSESSMENT & PLAN NOTE
28M PMHx alcohol use disorder (5th per day), bipolar disorder, SDH (4/15/24) presenting with progressive back spasms and pain limiting mobility found to have subacute spinal SAH extending from L3-S2 w effacement of thecal sac and abutment of several sacral nerve roots.   Admit to NCC  q1h neuro checks and vitals   CBC, CMP, mag, and phos daily   Coags, TSH, A1c, lipid panel, UA  Echo, EKG, CXR  SBP <120  prn labetalol and hydralazine   Neurosurgery following   Consulted neuro IR for spinal angio tomorrow AM w anesthesia   NPO   SCDs; hold chemical VTE ppx in acute setting   Hold AC/AP  PT/OT/SLP  Admit to NCC, Angio neg, MRI spine pending, CEC removed

## 2024-04-24 NOTE — DISCHARGE INSTRUCTIONS
REFERRAL RECOMMENDATIONS FOR SUBSTANCE ABUSE & MENTAL HEALTH      IN CASE OF SUICIDAL THINKING, call the National Suicide Hotline Number: 988    988 Suicide & Crisis Lifeline: 980 , 1-272-327-YKBX (6731)  https://988FrontalRain Technologies.WAVE (Wireless Advanced Vehicle Electrification)       SUBSTANCE ABUSE:     [facility] REC[facility]ERY [facility] (formerly known as the ABU)  [x] [phone], Option 2  [x] [address] [facility] 4[facility], [place] [address]  [x] [email or web address]  [x] The [facility] delivers comprehensive and collaborative treatment for alcohol and substance use disorders.  Excellent program for working professionals or anyone else seeking recovery.  [x] Requires insurance approval prior to starting program, call number above for more information.  [x] Intensive Outpatient Rehabilitation Program - M-F 9am-3pm - daily groups with psychologists and social workers, sessions with MDs 3x per week   [x] Ambulatory detox and dual diagnosis available      SUBOXONE:     NOTE: some Suboxone clinics require their clients to participate in a structured program (such as an IOP) in order to be prescribed Suboxone.  Some clinics have a long waiting list.  Most of these clinics do not accept walk-in clients, so call first to to learn what must be done to get started on Suboxone.    [facility] - [phone] (can do Sublocade)  [address] [facility], [place] [address]    [address]  [phone]    [facility] - [phone] (can do Sublocade)  [address] [place] [address]    [facility]  [address] [place]  [phone]     [facility] (very short waiting list, may accept some walk-in's but call first if possible)  [address], [place] [address]  [phone]; [phone]    [facility]   [address] [place]    [phone]    [facility] (can usually be seen within a week but is cash only  for appointment)  [address] [place], [place]    [facility] ([facility])  [address] [facility], [address] [place] [id number] [phone]    [facility] ([facility])  [address], [place]  [phone]    [facility] ([facility])  [address] [facility] [address]  [phone]    [place], [place]:    [facility] - [address] [place] [address] - Tel: [phone]    Blanco Watson - [address] [place] [address] - Tel: [phone]    Aneesh Gay - [address] [place] [address] - Tel: [phone]    Maninder Mayo - [address] [place] [address] - Tel: [phone]    Cas Matta - [address] [place] [address] - Tel: [phone]    [place], [place]:     Dr. Carlos Benitez and Dr. Ronny Retana - [address], [place] - Tel: [phone]    Dr. Annika Francis - [address], [place] - Tel: [phone]    Dr. Alfonso Merchant - Tel: [phone]    Dr. Joseph Boles - [facility] - [phone]      METHADONE:     [facility] (the only methadone clinic in the [facility], has two locations)  [x] [place] - [facility] [place] [address], [phone]  [x] [facility] [place] [address], [phone]    12 STEP PROGRAMS (and similar):     Alcoholics Anonymous (local)  [x] [phone]  [x] www.[place].org for schedules for in-person and online meetings  [x] There are AA meetings throughout the day all over town  [x] AA costs nothing to attend; they pass a basket for donations but this is not required    Narcotics Anonymous  [x] [phone]  [x] www.noana.org  [x] There are NA meetings throughout the day all over town  [x] NA costs nothing to attend; they pass a basket for donations but this is not required    Alcoholics Anonymous Online Intergroup (national)  [x] www.aa-intergroup.org  [x] Good resource for large, nation-wide meetings  [x] Can also attend smaller, local meetings in other cities  [x] Countless meetings  all day and all night  [x] AA costs nothing to attend; they pass a basket for donations but this is not required    F[employer] Sober - 24/7 zoom meetings for women and coed - sign on anytime, anywhere!  https://flyingCornerstone OnDemandsober.Mom Trusted/20-7-xqavbsxt/    Online Intergroup of AA - 121 Open AA [place] Meeting - 24/7 zoom meetings  https://aa-intergroup.org/meetings/    LOOKING FOR AN ALTERNATIVE TO 12 STEP PROGRAMS - check out:  SMART Recovery: https://www.smartrecovery.org/about-us  [facility] Recovery: https://recoverydharma.org      DETOX UNITS (USUALLY 5-7 DAYS):     [facility]: [address], [place]  [phone], call first to ensure bed availability    [facility]: [address] [place]  [phone], Option 1, call first to ensure bed availability    [facility]: [facility] [address] [place], [facility]  [phone] (intake by appointment only)    [facility]: [address], [place]  [phone]      INTENSIVE OUTPATIENT PROGRAMS:     [facility] (formerly known as the ABU)  [x] [phone], Option 2  [x] [address] [facility] 4th [facility], [facility] [address]  [x] [email or web address]://[email or web address]/[email or web address]-program  [x] [facility] delivers comprehensive and collaborative treatment for alcohol and substance use disorders.  Excellent program for working professionals or anyone else seeking recovery.  [x] Requires insurance approval prior to starting program, call number above for more information.  [x] Intensive Outpatient Rehabilitation Program - M-F 9am-3pm - daily groups with psychologists and social workers, sessions with MDs 3x per week   [x] Ambulatory detox and dual diagnosis available    [facility] Intensive [facility]patient Program  [x] [phone]  [x] [address] [facility] (the clinic not on [facility] main [facility])  [x] Call number above for more info and to check insurance requirements    [address] [place] [address]  [phone]    [facility]:  [address] [facility], [address]?, [place], [place] [address]?, [phone]  [address] [facility]?, [place], [place] [address]?, [phone]    RESIDENTIAL REHABS (USUALLY 28 DAYS):     [facility]: [address], [phone]    [place] [facility]: [address] [place]  [phone] (intake by appointment only)    [facility] (men only) [address], [phone]    [facility] (Female only) [address], [phone]    [facility]: [address], [place], men's program [phone], women's program [phone]    [facility]: [address] [place] [address], [phone]    [facility]: [address], [place], [place], [phone]    [facility]: Men only, [phone], [address] [facility] Tomás Mendez [facility]: [address], [place], [place], [phone]    [facility]: [address] [place] [address],  [phone]  New Location: [address] [place] [address] [address], [place] [address], [phone]    [facility]:   ?[address]?[place], [place] [address]?[phone]    [place]: [address], [place] [address], [phone]    [place]: [place], [phone]     [facility]: [place], [place], [phone]    [facility]: LAUREN Samuel, [phone]    [facility]: [place], [place], [phone]    [place]: LAUREN Samuel, [phone]    [facility]: [address], [place] [address], [phone]    COMMUNITY ADDICTION CLINICS:     [facility]: [address] [facility], [address] [place] -[phone] -or- [address] [address]    [facility] [place]: [address] [facility], [place]  [address]     [facility]: Clinics [phone]; Crisis [phone]    [facility]: [address] [place] [address]    [facility]: [address] [facility] [place] [address]    [facility]: [address] [place] [address],    [facility] Center: 2nd Floor [address] [place] [address]    [facility]: [address], [facility] [place] [address]    [facility], [address] [place] [address]    [facility]: [address] [facility], [place] [phone]  (serves youth 16-23 years old)    [facility]: [facility]/[place]/[facility] [phone]    [facility]: [address] [facility], [place] [phone]    [facility]: [address] [facility] [phone]    [facility]: [address] [facility] [facility], [address], [phone]     [facility]: [address] [facility], [phone], [phone]    RESOURCES IN OTHER [facility]:     [facility]: [address] [facility], [phone], [phone]    [facility]: [address] [facility], [address], [phone]    [facility], [address] [place] [address], [phone]    [facility]: [address] [facility], [phone]    [facility], [address] [facility], [phone] ([facility])    [facility], [address] [facility], [phone] ([facility])    [facility], [address], [place], [phone] ([facility], [facility])    [facility], [address] [place], [phone] ([facility])    [facility] Hotline [phone], [phone]    [facility], [address] [facility], [address] [place] [address]  "Blvd[address], [facility], [address] [facility] [address], [facility], [address] [facility] [address]., [place] [facility], [address], [place]    [facility], [address], [place] [phone]    [facility], [address] [facility] [phone]    [facility], [address] [facility] [phone]    [facility], [address] [facility], [phone]      [place] Zealander SPEAKING (en español):     Información de la reunión de Alcohólicos Anónimos  Felix [facility], 10:00 am  Habla [facility]  Esta reunión está abierta y cualquiera puede asistir.    Equatorial Guinean speaking Alcoholics anonymous meetings:  El "Felix [place]ober[place] AA Skype" es un felix on line de Alcohólicos Anónimos en [facility]. El felix es armen, gratuito y virtual a través de Skype Audio. El felix funciona mediante robert llamada grupal de voz, por lo que no se utiliza la videollamada, ni se pueden mariam las imágenes o rostros de los participantes. Hace tanisha años y medio abrimos el primer Felix de AA por Skype en [facility], ozzie actualmente asisten personas desde [place], [place], [place], [place], [place], [place], [place], [place], [place], [place], [place] y [place], entre otros.    El felix es muy útil para los alcohólicos que residen en lugares donde no se celebran reuniones de AA, o residen en lugares donde las reuniones de AA son un número limitado de días a la semana, o para aquellos compañeros que se hayan de viaje o que, por cualquier motivo, se hayan convalecientes y no pueden desplazarse. Todos los días nos reuniones a las 21:00 (hora española)    Podéis obtener más información sobre el felix y feroz sesiones en la página web [email or web address]      MENTAL HEALTH:     [facility]  Department of Psychiatry - " Outpatient Clinic  [phone]    [facility] of Psychiatry - General Psychiatry Intensive Outpatient Program  [facility] (formerly known as the BMU)  [phone], option 3    [facility] Psych[facility] - Dual Diagnosis Intensive Outpatient Program  [facility] (formerly known as the ABU)  [phone], option 2      [facility] MENTAL HEALTH CENTERS:     [facility]  ([facility])  Serves [facility].  Serves uninsured patients & those with Medicaid.  Main location: [address] [place] [id number] [phone]  Walk-in's available during regular business hours.  24/7 Crisis Line: [phone]    [facility]  (aka [facility], [facility])  Serves [facility].  Serves uninsured patients, those with Medicaid and some private plans.  Walk-in's available during regular business hours.  Primary care services available as well.  [facility]: [address] [facility] [place] [address];  [phone]  [place]: [address] [place] [address];  [phone]  24/7 Crisis Line: [phone]    [facility]  Serves uninsured patients & those with Medicaid, call for more info.  Primary care, pediatrics, HIV treatment, and dentistry services available as well.  Three locations.  [phone]    [facility] [place]?Corporate Office  Serves patients with Medicaid, Medicare, and private insurance  [address]  [place],?[place] [address]  [phone]    [facility]  Serves uninsured on a sliding scale, as well as Medicaid, Medicare, and private plans.  Eight locations around the [facility] area.  [phone]    [facility]  Serves uninsured patients & those with Medicaid, private insurances.  Primary care  available as well.  [phone]  [address] [place] [address]    [facility] Outpatient Psychiatry  Serves veterans who were honorably discharged.  [address] [place] [address]  [phone]  24/7 Veterans Crisis Line: [phone] (Press 1)    If you have private insurance and need to find a specialist, please contact your insurance network to request a list of providers covered by your benefits.      MENTAL HEALTH/ADDICTIVE DISORDERS:     AA ([phone]), NA ([phone])   National Suicide Prevention Lifeline- Call 1-250.307.8744 Available 24 hours everyday  [facility] [phone]; Crisis Line 291-0257 - Call for options A-F:  Intensive Outpatient Treatment/ Day programs   [facility], please contact   [facility], please contact [phone] or [phone] to speak with an admissions counselor.  [facility] (Methadone Maintenance)   [address] [place] [address], [phone]  [address] [place] [address] [phone]  [facility], [address] [place] [address], [phone]  [facility] [phone]  [facility] [place] please contact [phone]  [facility], [address] [facility], 4th floor [place] [address] Phone: [phone]   Stephanie Jasso Office: [address] [address], [phone]  [place] Office: [address] [facility], [address], [place] [address], [phone]  [place] Office: [address] [place] [address] [phone]    Outpatient Substance Abuse Treatment   [facility] (Methadone Maintenance)   [address] [place] [address], [phone]  [address] [place] [address] [phone]  [facility], [address] [place] [address], [phone]  [facility]  [place] Office: [address], [place]AR [address], [phone]  [place]  Office: [address] [facility], [address], [place] [address], [phone]  [facility] Office: [address] [facility], [place] [address] [phone]  [facility], [address] [place] [address] [phone]   [facility], [address] [facility], [address], [phone]  [facility], [address] [place], [place] [phone]    Residential Substance Abuse Treatment   [facility] [address] [facility], [phone] or [phone]  [facility], [address] [facility], [phone]  [facility] (men only) [address] [place] [address], [phone]  [facility] (women only) [address] [place] [address] [phone]  [facility], [address] [place] [address] [phone]  [facility] (women only), intakes at 4[address] [facility], [phone]  [facility] (7-day program, $100, [address], [phone], [phone], [phone])  [facility] (Men only, [phone]), [address], Tomás (Vets*/Non-Vets)  [facility] (Men only, $400/month program fee) [address] [facility], [phone]  [facility] (Women over age 39 only), [address] [facility], [phone]    Out of Area:    [place], [address] [facility] [address], [place], [place] ([phone])  [facility] Program (men only), [address] [place] [address], [phone]  [facility], [address] [place], [place] ([phone])  Luke, [address] [place] [address] Dr. Gonzalez, [place] ([phone])  [facility], [address] [place] [address], [phone]  [facility] (Women only, has to have mental illness, can be homeless or substance abuser), [phone]        DOMESTIC VIOLENCE RESOURCES:     Advocacy  [facility] ([facility])  [address] [address]    [facility] ? [phone]  Services  provided: emergency shelter, individual advocacy, information and referrals, group support, children's program, medical advocacy, forensic medical exams, primary care, legal assistance, counseling, safety planning, and caregiver support    [facility] HEALING AND EMPOWERMENT [place]  Confidential location  [facility] ? [phone]  Services provided: short term emergency shelter, all services provided are free of charge    [facility] FOR COMMUNITY ADVOCACY  Multiple locations in [facility], [facility], [place], and [facility] ([place], [place], and [place])    [facility] ? [phone]  Services provided: emergency shelter, individual advocacy, information and referrals, group support, children's program, medical advocacy, legal assistance in obtaining restraining orders, counseling, safety planning, and caregiver support    [facility]   Emergency Shelter   [phone]  Emergency Services ,Legal and Financial Assistance Services ,Housing Services ,Support Services     [facility]   [phone]  Emergency Services ,Counseling Services , Housing Services ,Support Services ,Children's Services     [facility]OMEN WITH A VISION  [address] [place] [address]  WWAV ? [phone]  Services provided: advocacy, health education and supportive services, specializing in free healing services for marginalized groups, including LGBTQ individuals and sex workers    SEXUAL TRAUMA AWARENESS AND RESPONSE (STAR)  [address] [place] [address]    STAR ? [phone] 170-[phone]  Services provided: individual advocacy, information and referrals, group support, medical advocacy, legal assistance, counseling, and safety planning for survivors of sexual assault    [facility])  [address] [place] [address]  [facility] Forensic Program ? [phone]  Services provided: free forensic medical exams for sexual assault and  domestic violence, which can be performed up to 5 days after an incident. It is not necessary to make a police report to receive a forensic medical exam    Legal  [facility]  [address], [place] [address]  [facility] ? [phone]  Services provided: free emergency legal representation for survivors of doemstic violence residing in [facility]. Legal services may include temporary restraining orders, temporary child support, custody, and use of property    [facility] LEGAL SERVICES (SLLS)  [address], [place] [address]  SLLS ? [phone]  Services provided: free legal representation for survivors of domestic violence residing in [facility]. Legal services may include temporary child support, custody, and divorce      HOTLINES:     [facility] DOMESTIC VIOLENCE HOTLINE  [phone]    Services provided: free and confidential hotline for victims and survivors of domestic violence. All calls will be routed to a domestic violence service provided in the victim or survivor's area    NATIONAL HUMAN TRAFFICKING HOTLINE  ([phone]) [phone]    Services provided: national anti-trafficking hotline serving victims and survivors of human trafficking. Provides information about local resources, and access to safe space to report tips, seek services, and ask for help    VIA LINK  211 or (865) 944-3205    Service provided: counselors can provide crisis counseling. Counselors can also provide information and referrals to programs which can help with needs such as food, shelter, medical care, financial assistance, mental health services, substance abuse treatment, senior services, childcare, and more      HOMELESS SHELTERS:      Homeless shelters  [facility]  Emergency shelter for individuals and families  [address]  [phone]  [facility]  Emergency shelter for men only  Meals daily 6am, 2pm, & 6pm  Clothing, case management M-F by appointment  (ID/job/housing/legal assistance), mail  [address] [facility]  [phone]  [facility] [place]  Emergency shelter for men  [address] [facility]  [phone]  Emergency shelter for women  [address] [facility]  [phone]  Breakfast & lunch daily, dinner M-F  Case management, job counseling services   [facility]  Emergency shelter for teens and young adults up to 20yo  [address]  [phone]  [facility]  Emergency shelter for women over 17yo and their kids  [address] [place] [address]  [phone]  [facility]  Day program, meals M-F 1PM (arrive early)  Showers, laundry, hygiene kits, showers, phones, , notary services, case management, ID assistance  [address] [facility]  [phone] M-F 8am-2:30pm  [facility]  Day program  [facility] 7:30am-3:30pm,  8:30am-3:30pm  Crisis intervention, employment assistance, food/clothing, hygiene kits, bus tokens, mail  [address] [facility] [address]  [phone]  [facility]  Mobile outreach for homeless persons in [facility]  [phone]  [facility]  Primary healthcare, case management, dental services, TB placement  Call ahead  [address] [facility] 2nd [address]  [phone]  [facility]  Connects homeless people with their loved ones in other cities by providing transportation costs   [phone]      [place] RESOURCES:     [place] Mobile Mental Health Crisis Response Team:    Region 12 ([place], [place], [place], and [facility]) ([facility] and [facility])  [phone]      Outpatient Mental Health & Addiction Clinic Resources for both [facility] and [facility]:    [facility] Mental Healthcare Resources  Website: www.pbmhr.org  Main Number: 345-006-9676    [facility] ([facility])  [address] ([address] [facility]) [place] [address]  [phone]    [facility] ([place]  Area)  [address] ([address] [facility]) [place] [address]  [phone]    [facility]  [address] ([address]) [place] [address]  [phone]    [facility]  [address] ([address] [facility] [place] [address]  [phone]      Addiction Treatment Resources for both [facility] and [place]:    [facility] (Detox, Residential, PHP, IOP, and Aftercare Programs)  [address] [place] [address]  [phone]    [facility] (Residential, IOP, Transitional Living, and Aftercare Programs)  [address] [place] [address]  [phone]    [facility] (Inpatient, Residential, Detox, IOP, Outpatient, and Aftercare Programs)  [address] [place] [address]  [phone] or toll free at [phone]      Outpatient Mental Health Psychotherapy Resources for both [facility] and [place]:    Johanna Lockwood, [facility]  [address]  [place] [address]  [phone]  Specialties: Depression, Anxiety, and Life Transitions    Roseanna Virgen, PhD  [address]  [place] [address]  [phone]  Specialties: Testing and Evaluation, Education and Learning Disabilities, and ADHD    Kyra Abreu, [facility] [address] [place] [address]  [phone]  Specialties: Obsessive-Compulsive (OCD), Depression, and Relationship Issues    Lilly Romero LPC [address] [place] [address]  [phone]  Specialties: Trauma & PTSD, Mood Disorders, and Anxiety    Lilly Romo, PhD, [facility] [address] [place] [address]  [phone]  Specialties: Family Conflict, Child, and Relationship Issues    Adele Alonso LPC [facility] [place] [address] [phone]  Specialties: Anxiety, Depression, and  Anger Management        IN CASE OF SUICIDAL THINKING, call the National Suicide Hotline Number: 988    988 Suicide & Crisis Lifeline: 988 , 4-0390-413-143-TALK (8243)  Provides 24/7, free and confidential support for people in distress, prevention and crisis resources for you or your loved ones, and best practices for professionals.    Call, text or chat.  https://[employer].org

## 2024-04-24 NOTE — NURSING
Patient arrived to [facility] from ED 27 by stretcher    Report received from: ED RN, Natalie    Type of stroke/diagnosis:  SAH of spine    Current symptoms: AAOx4, follows commands/tracks, right sided weakness, headache, back pain, no complaints of numbness/tingling     Skin Assessment done: Yes  Wounds noted: 2 healing scabs to R temporal area, healing scab to R elbow   *If wounds noted, was Wound Care consulted? N/A  *If wounds noted, LDA placed? Y/N  Skin Assessment Verified by: GABRIELA Trevino and GABRIELA Valdez Completed? Yes    Patient Belongings on Admit: phone, phone  (placed in pt valuable envelope); black slippers, 2 black T-shirts, black Nike shorts, blue/grey underwear, degree deodorant, black socks, envelope of papers     NCC notified: ESTEFANÍA Herrera
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
## 2024-04-24 NOTE — ANESTHESIA POSTPROCEDURE EVALUATION
Anesthesia Post Evaluation    Patient: Jun Mendoza    Procedure(s) Performed: * No procedures listed *    Final Anesthesia Type: general      Patient location during evaluation: PACU  Patient participation: Yes- Able to Participate  Level of consciousness: awake and alert  Post-procedure vital signs: reviewed and stable  Pain management: adequate  Airway patency: patent    PONV status at discharge: No PONV  Anesthetic complications: no      Cardiovascular status: blood pressure returned to baseline and hemodynamically stable  Respiratory status: spontaneous ventilation  Hydration status: euvolemic  Follow-up not needed.              Vitals Value Taken Time   /72 04/24/24 1431   Temp 36.5 °C (97.7 °F) 04/24/24 1152   Pulse 86 04/24/24 1453   Resp 20 04/24/24 1457   SpO2 99 % 04/24/24 1453   Vitals shown include unfiled device data.      No case tracking events are documented in the log.      Pain/Emrle Score: Pain Rating Prior to Med Admin: 5 (4/24/2024  2:57 PM)  Pain Rating Post Med Admin: 8 (per pt, tolerable) (4/24/2024  1:52 AM)

## 2024-04-24 NOTE — PROVIDER PROGRESS NOTES - EMERGENCY DEPT.
Encounter Date: 4/23/2024    ED Physician Progress Notes        ED Physician Hand-off Note:    ED Course: I assumed care of patient from off-going ED physician team. Briefly, Patient is a 27 yo M with recent SDH sent to [facility] on a CEC. Pt presented here from [facility] yesterday with back spasms and after a fall. CT spine was done without acute findings. He was discharged back to [facility] where he was weak and fell again.  MRI was done with concern for subarachnoid bleed in the lumbar spine    At the time of signout plan was pending - spine consult.    Medications given in the ED:    Medications   sodium chloride 0.9% flush 10 mL (has no administration in time range)   acetaminophen tablet 650 mg (650 mg Oral Given 4/24/24 1431)   ondansetron injection 4 mg (has no administration in time range)   polyethylene glycol packet 17 g (17 g Oral Not Given 4/24/24 0900)   senna-docusate 8.6-50 mg per tablet 1 tablet (1 tablet Oral Given 4/24/24 0814)   OLANZapine injection 10 mg (has no administration in time range)   EScitalopram oxalate tablet 10 mg (10 mg Oral Given 4/24/24 0813)   doxycycline tablet 100 mg (100 mg Oral Given 4/24/24 0813)   potassium chloride 10 mEq in 100 mL IVPB (0 mEq Intravenous Stopped 4/24/24 0532)     And   potassium chloride 10 mEq in 100 mL IVPB (has no administration in time range)     And   potassium chloride 10 mEq in 100 mL IVPB (has no administration in time range)   magnesium sulfate 2g in water 50mL IVPB (premix) (has no administration in time range)   magnesium sulfate 2g in water 50mL IVPB (premix) (has no administration in time range)   sodium phosphate 15 mmol in dextrose 5 % (D5W) 250 mL IVPB (has no administration in time range)   sodium phosphate 20.01 mmol in dextrose 5 % (D5W) 250 mL IVPB (has no administration in time range)   sodium phosphate 30 mmol in dextrose 5 % (D5W) 250 mL IVPB (has no administration in time range)   sodium chloride 0.9% flush 10 mL (has no  administration in time range)   acetaminophen tablet 650 mg (has no administration in time range)   hydrALAZINE injection 10 mg (10 mg Intravenous Given 4/24/24 1817)   labetalol 20 mg/4 mL (5 mg/mL) IV syring (has no administration in time range)   niCARdipine 40 mg/200 mL (0.2 mg/mL) infusion (has no administration in time range)   HYDROcodone-acetaminophen 5-325 mg per tablet 1 tablet (1 tablet Oral Given 4/23/24 0908)   ketorolac injection 30 mg (30 mg Intramuscular Given 4/23/24 1117)   methocarbamoL tablet 1,000 mg (1,000 mg Oral Given 4/23/24 1119)   LIDOcaine 5 % patch 1 patch (1 patch Transdermal Patch Removed 4/23/24 2318)   iodixanoL (VISIPAQUE 320) injection 115 mL (115 mLs Intra-arterial Given 4/24/24 1137)   fentaNYL 50 mcg/mL injection 12.5 mcg (12.5 mcg Intravenous Given 4/24/24 1457)   midazolam injection 1 mg (1 mg Intravenous Given 4/24/24 1545)   gadobutroL (GADAVIST) injection 6 mL (6 mLs Intravenous Given 4/24/24 1714)   Discussed with ortho spine but they deferred to NSG. Dr. Givens was on call, discussed with her. Plan to admit but deciding level of care    10:47 PM  Pending neuro ICU admission orders  Psych consult placed  Disposition: admit    Patient comfortable with plan for admission. Patient counseled regarding exam, results, diagnosis, treatment, and plan.    Impression: Final diagnoses:  [M54.9] Back pain, unspecified back location, unspecified back pain laterality, unspecified chronicity  [S06.9X0D] Traumatic brain injury, without loss of consciousness, subsequent encounter  [G95.19] Hemorrhage into subarachnoid space of spine (Primary)

## 2024-04-24 NOTE — PLAN OF CARE
Recommendations     Diet advancement per SLP - Rec'd Regular diet.  If unable to advance diet, place NGT & initiate TFs. Rec'd Isosource 1.5 @ 50 mL/hr = 1800 kcals, 82 g of protein, 917 mL fluid.  RD to monitor & follow-up.     Goals: Meet % EEN, EPN by RD f/u date  Nutrition Goal Status: new  Communication of RD Recs: reviewed with RN

## 2024-04-24 NOTE — ASSESSMENT & PLAN NOTE
28M PMHx alcohol use disorder (5th per day), bipolar disorder, SDH (4/15/24) presenting with progressive back spasms and pain limiting mobility found to have subacute spinal SAH extending from L3-S2 w effacement of thecal sac and abutment of several sacral nerve roots.     Admit to NCC  q1h neuro checks and vitals   CBC, CMP, mag, and phos daily   Coags, TSH, A1c, lipid panel, UA  Echo, EKG, CXR  SBP <120  prn labetalol and hydralazine   Neurosurgery following   Consulted neuro IR for spinal angio tomorrow AM w anesthesia   NPO   SCDs; hold chemical VTE ppx in acute setting   Hold AC/AP  PT/OT/SLP

## 2024-04-24 NOTE — NURSING
MRI contacted- stated they will not be able to take patient until after lunch. Will follow up.    1300- MRI contacted. Stated they will have the other tech call me back.

## 2024-04-24 NOTE — CONSULTS
"[facility]  Adult Nutrition  Consult Note    SUMMARY     Recommendations    Diet advancement per SLP - Rec'd Regular diet.  If unable to advance diet, place NGT & initiate TFs. Rec'd Isosource 1.5 @ 50 mL/hr = 1800 kcals, 82 g of protein, 917 mL fluid.  RD to monitor & follow-up.    Goals: Meet % EEN, EPN by RD f/u date  Nutrition Goal Status: new  Communication of RD Recs: reviewed with RN    Assessment and Plan    Nutrition Problem:  Inadequate energy intake    Related to (etiology):   Inability to consume sufficient energy     Signs and Symptoms (as evidenced by):   NPO    Interventions(treatment strategy):  Collaboration of nutrition care w/ other providers    Nutrition Diagnosis Status:   New    Reason for Assessment    Reason For Assessment: consult  Diagnosis: other (see comments) (Hemorrhage into subarachnoid space of spine)  Relevant Medical History: Etoh abuse  Interdisciplinary Rounds: did not attend    General Information Comments: S/p spinal angiogram. NPO; pending SLP evaluation. Per RD assessment (4/20): pt reported good appetite PTA & UBW of 135#. Pt w/ no indicators of malnutrition.   Nutrition Discharge Planning: Adequate nutrition    Nutrition/Diet History    Patient Reported Diet/Restrictions/Preferences: general  Factors Affecting Nutritional Intake: NPO    Anthropometrics    Temp: 97.7 °F (36.5 °C)  Height Method: Stated  Height: 5' 7" (170.2 cm)  Height (inches): 67 in  Weight Method: Bed Scale  Weight: 60.3 kg (132 lb 15 oz)  Weight (lb): 132.94 lb  Ideal Body Weight (IBW), Male: 148 lb  % Ideal Body Weight, Male (lb): 89.82 %  BMI (Calculated): 20.8  BMI Grade: 18.5-24.9 - normal    Lab/Procedures/Meds    Pertinent Labs Reviewed: reviewed  Pertinent Medications Reviewed: reviewed    Estimated/Assessed Needs    Weight Used For Calorie Calculations: 60.3 kg (132 lb 15 oz)    Energy Calorie Requirements (kcal): 1844 kcal/d  Energy Need Method: Irwin-St Yoana (1.2 " PAL)    Protein Requirements: 60-73 g/d (1-1.2 g/kg)  Weight Used For Protein Calculations: 60.3 kg (132 lb 15 oz)    Estimated Fluid Requirement Method: other (see comments) (Per MD)  RDA Method (mL): 1844    Nutrition Prescription Ordered    Current Diet Order: NPO    Evaluation of Received Nutrient/Fluid Intake    I/O: -2.2L since admit    Comments: LBM: 4/23    Nutrition Risk    Level of Risk/Frequency of Follow-up:  (1x/week)     Monitor and Evaluation    Food and Nutrient Intake: enteral nutrition intake, food and beverage intake, energy intake  Food and Nutrient Adminstration: diet order, enteral and parenteral nutrition administration  Physical Activity and Function: nutrition-related ADLs and IADLs  Anthropometric Measurements: weight, weight change  Biochemical Data, Medical Tests and Procedures: inflammatory profile, lipid profile, glucose/endocrine profile, gastrointestinal profile, electrolyte and renal panel  Nutrition-Focused Physical Findings: overall appearance     Nutrition Follow-Up    RD Follow-up?: Yes
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
## 2024-04-24 NOTE — SUBJECTIVE & OBJECTIVE
Past Medical History:   Diagnosis Date    Alcohol abuse     Chlamydia      Past Surgical History:   Procedure Laterality Date    axillary gland        No current facility-administered medications on file prior to encounter.     Current Outpatient Medications on File Prior to Encounter   Medication Sig Dispense Refill    megestroL (MEGACE) 400 mg/10 mL (40 mg/mL) Susp Take 400 mg by mouth once daily.      doxycycline (VIBRA-TABS) 100 MG tablet Take 1 tablet (100 mg total) by mouth every 12 (twelve) hours. for 4 days      EScitalopram oxalate (LEXAPRO) 10 MG tablet Take 1 tablet (10 mg total) by mouth once daily.      levETIRAcetam (KEPPRA) 500 MG Tab Take 1 tablet (500 mg total) by mouth 2 (two) times daily. for 2 days 4 tablet 0    OLANZapine (ZYPREXA) injection Inject 10 mg into the muscle every 8 (eight) hours as needed for Agitation.        Allergies: Penicillins  Family History   Problem Relation Name Age of Onset    Colon cancer Father      Cancer Maternal Grandmother       Social History     Tobacco Use    Smoking status: Never    Smokeless tobacco: Never   Substance Use Topics    Alcohol use: Not Currently     Comment: socially    Drug use: Never     Review of Systems   Constitutional:  Negative for chills and fever.   HENT:  Negative for trouble swallowing.    Eyes:  Negative for visual disturbance.   Respiratory:  Negative for shortness of breath.    Cardiovascular:  Negative for chest pain, palpitations and leg swelling.   Gastrointestinal:  Negative for abdominal pain, constipation, diarrhea, nausea and vomiting.   Genitourinary:  Negative for difficulty urinating.   Musculoskeletal:  Positive for back pain, neck pain and neck stiffness.   Neurological:  Positive for weakness and headaches. Negative for seizures, syncope and speech difficulty.   Psychiatric/Behavioral:  Positive for decreased concentration.      Objective:     Vitals:    Temp: 97.7 °F (36.5 °C)  Pulse: 73  Rhythm: normal sinus  rhythm  BP: 126/76  MAP (mmHg): 96  Resp: 20  SpO2: 99 %    Temp  Min: 95.5 °F (35.3 °C)  Max: 99.7 °F (37.6 °C)  Pulse  Min: 61  Max: 104  BP  Min: 110/68  Max: 139/92  MAP (mmHg)  Min: 83  Max: 110  Resp  Min: 11  Max: 35  SpO2  Min: 96 %  Max: 100 %    04/23 0701 - 04/24 0700  In: 100 [P.O.:100]  Out: 325 [Urine:325]   Unmeasured Output  Urine Occurrence: 1        Physical Exam  Vitals and nursing note reviewed.   Constitutional:       General: He is not in acute distress.     Appearance: Normal appearance. He is normal weight.   HENT:      Head: Normocephalic.      Right Ear: External ear normal.      Left Ear: External ear normal.      Nose: Nose normal.      Mouth/Throat:      Mouth: Mucous membranes are dry.      Pharynx: Oropharynx is clear.   Eyes:      Extraocular Movements: Extraocular movements intact.      Conjunctiva/sclera: Conjunctivae normal.      Pupils: Pupils are equal, round, and reactive to light.   Cardiovascular:      Rate and Rhythm: Normal rate and regular rhythm.      Pulses: Normal pulses.   Pulmonary:      Effort: Pulmonary effort is normal. No respiratory distress.   Abdominal:      General: There is no distension.      Palpations: Abdomen is soft.      Tenderness: There is no abdominal tenderness.   Musculoskeletal:      Cervical back: Normal range of motion.      Right lower leg: No edema.      Left lower leg: No edema.   Skin:     General: Skin is warm and dry.      Capillary Refill: Capillary refill takes less than 2 seconds.   Neurological:      Mental Status: He is alert.      Comments: E4 V5 M6  Alert. Oriented x person, place  Flat affect. Speech delayed. No dysarthria or aphasia. Following commands.   CN II-XII grossly intact, specifically:  PERRL. EOMI.  Visual fields grossly intact   No facial asymmetry. Facial sensation intact in V1-V3.  Tongue midline. Shoulder shrug symmetric.  Motor:  RUE 5/5  LUE 5/5  Exam in BLE is limited by pain and requires excessive encouragement    RLE 4-/5  LLE 4-/5   Sensation mildly reduced to light touch in RLE compared to LLE   Psychiatric:         Attention and Perception: He is inattentive.         Mood and Affect: Affect is blunt.         Speech: Speech is delayed.         Behavior: Behavior is slowed.         Cognition and Memory: Memory is impaired. He exhibits impaired recent memory and impaired remote memory.              Today I personally reviewed pertinent medications, lines/drains/airways, imaging, cardiology results, laboratory results, microbiology results, notably:    CTH   No acute intracranial process with no detrimental change.  See above comments.  There is very minimal asymmetric hyperdensity along the left posterolateral tentorium compared to the right, seen on coronal 98 and sagittal 124. This is similar to a prior study and could represent minimal subdural hematoma versus minimal asymmetric tentorial thickening or calcification.  No detrimental change.  Recommend surveillance.    CT full spine   Unremarkable    MRI L spine   T12-L1: Unremarkable.  L1-2: Unremarkable.  L2-3: Unremarkable.  L3-4: Mild effacement of the thecal sac (09:26).  L4-5: Moderate effacement of the thecal sac, with abutment of several right descending sacral nerve roots (09:38).  L5-S1: Complete effacement of the thecal sac, with abutment of several sacral nerve roots bilaterally  Impression: Subacute spinal subarachnoid hemorrhage extending from L3-S2, with effacement of the thecal sac and abutment of several sacral nerve roots.
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
## 2024-04-25 VITALS
DIASTOLIC BLOOD PRESSURE: 59 MMHG | BODY MASS INDEX: 20.87 KG/M2 | SYSTOLIC BLOOD PRESSURE: 105 MMHG | HEIGHT: 67 IN | TEMPERATURE: 99 F | WEIGHT: 132.94 LBS | HEART RATE: 83 BPM | OXYGEN SATURATION: 100 % | RESPIRATION RATE: 16 BRPM

## 2024-04-25 LAB
ALBUMIN SERPL BCP-MCNC: 3.9 G/DL (ref 3.5–5.2)
ALBUMIN SERPL BCP-MCNC: 3.9 G/DL (ref 3.5–5.2)
ALP SERPL-CCNC: 41 U/L (ref 55–135)
ALP SERPL-CCNC: 41 U/L (ref 55–135)
ALT SERPL W/O P-5'-P-CCNC: 14 U/L (ref 10–44)
ALT SERPL W/O P-5'-P-CCNC: 14 U/L (ref 10–44)
ANION GAP SERPL CALC-SCNC: 10 MMOL/L (ref 8–16)
ANION GAP SERPL CALC-SCNC: 10 MMOL/L (ref 8–16)
AST SERPL-CCNC: 20 U/L (ref 10–40)
AST SERPL-CCNC: 20 U/L (ref 10–40)
BASOPHILS # BLD AUTO: 0.01 K/UL (ref 0–0.2)
BASOPHILS NFR BLD: 0.1 % (ref 0–1.9)
BILIRUB SERPL-MCNC: 0.6 MG/DL (ref 0.1–1)
BILIRUB SERPL-MCNC: 0.6 MG/DL (ref 0.1–1)
BUN SERPL-MCNC: 14 MG/DL (ref 6–20)
BUN SERPL-MCNC: 14 MG/DL (ref 6–20)
CALCIUM SERPL-MCNC: 9.3 MG/DL (ref 8.7–10.5)
CALCIUM SERPL-MCNC: 9.3 MG/DL (ref 8.7–10.5)
CHLORIDE SERPL-SCNC: 107 MMOL/L (ref 95–110)
CHLORIDE SERPL-SCNC: 107 MMOL/L (ref 95–110)
CO2 SERPL-SCNC: 20 MMOL/L (ref 23–29)
CO2 SERPL-SCNC: 20 MMOL/L (ref 23–29)
CREAT SERPL-MCNC: 0.8 MG/DL (ref 0.5–1.4)
CREAT SERPL-MCNC: 0.8 MG/DL (ref 0.5–1.4)
DIFFERENTIAL METHOD BLD: ABNORMAL
EOSINOPHIL # BLD AUTO: 0 K/UL (ref 0–0.5)
EOSINOPHIL NFR BLD: 0.3 % (ref 0–8)
ERYTHROCYTE [DISTWIDTH] IN BLOOD BY AUTOMATED COUNT: 11.6 % (ref 11.5–14.5)
EST. GFR  (NO RACE VARIABLE): >60 ML/MIN/1.73 M^2
EST. GFR  (NO RACE VARIABLE): >60 ML/MIN/1.73 M^2
GLUCOSE SERPL-MCNC: 99 MG/DL (ref 70–110)
GLUCOSE SERPL-MCNC: 99 MG/DL (ref 70–110)
HCT VFR BLD AUTO: 39.6 % (ref 40–54)
HGB BLD-MCNC: 13.5 G/DL (ref 14–18)
IMM GRANULOCYTES # BLD AUTO: 0.01 K/UL (ref 0–0.04)
IMM GRANULOCYTES NFR BLD AUTO: 0.1 % (ref 0–0.5)
LYMPHOCYTES # BLD AUTO: 1.6 K/UL (ref 1–4.8)
LYMPHOCYTES NFR BLD: 23.5 % (ref 18–48)
MAGNESIUM SERPL-MCNC: 1.9 MG/DL (ref 1.6–2.6)
MCH RBC QN AUTO: 29.9 PG (ref 27–31)
MCHC RBC AUTO-ENTMCNC: 34.1 G/DL (ref 32–36)
MCV RBC AUTO: 88 FL (ref 82–98)
MONOCYTES # BLD AUTO: 0.7 K/UL (ref 0.3–1)
MONOCYTES NFR BLD: 10.1 % (ref 4–15)
NEUTROPHILS # BLD AUTO: 4.5 K/UL (ref 1.8–7.7)
NEUTROPHILS NFR BLD: 65.9 % (ref 38–73)
NRBC BLD-RTO: 0 /100 WBC
PHOSPHATE SERPL-MCNC: 3.7 MG/DL (ref 2.7–4.5)
PLATELET # BLD AUTO: 274 K/UL (ref 150–450)
PMV BLD AUTO: 9.1 FL (ref 9.2–12.9)
POCT GLUCOSE: 114 MG/DL (ref 70–110)
POTASSIUM SERPL-SCNC: 4.3 MMOL/L (ref 3.5–5.1)
POTASSIUM SERPL-SCNC: 4.3 MMOL/L (ref 3.5–5.1)
PROT SERPL-MCNC: 7.3 G/DL (ref 6–8.4)
PROT SERPL-MCNC: 7.3 G/DL (ref 6–8.4)
RBC # BLD AUTO: 4.51 M/UL (ref 4.6–6.2)
SODIUM SERPL-SCNC: 137 MMOL/L (ref 136–145)
SODIUM SERPL-SCNC: 137 MMOL/L (ref 136–145)
WBC # BLD AUTO: 6.81 K/UL (ref 3.9–12.7)

## 2024-04-25 PROCEDURE — 94761 N-INVAS EAR/PLS OXIMETRY MLT: CPT

## 2024-04-25 PROCEDURE — 99233 SBSQ HOSP IP/OBS HIGH 50: CPT | Mod: ,,, | Performed by: INTERNAL MEDICINE

## 2024-04-25 PROCEDURE — 80053 COMPREHEN METABOLIC PANEL: CPT

## 2024-04-25 PROCEDURE — 97530 THERAPEUTIC ACTIVITIES: CPT

## 2024-04-25 PROCEDURE — 83735 ASSAY OF MAGNESIUM: CPT

## 2024-04-25 PROCEDURE — 92610 EVALUATE SWALLOWING FUNCTION: CPT

## 2024-04-25 PROCEDURE — 92523 SPEECH SOUND LANG COMPREHEN: CPT

## 2024-04-25 PROCEDURE — 84100 ASSAY OF PHOSPHORUS: CPT

## 2024-04-25 PROCEDURE — 25000003 PHARM REV CODE 250: Performed by: NURSE PRACTITIONER

## 2024-04-25 PROCEDURE — 85025 COMPLETE CBC W/AUTO DIFF WBC: CPT

## 2024-04-25 PROCEDURE — 25000003 PHARM REV CODE 250

## 2024-04-25 PROCEDURE — 97161 PT EVAL LOW COMPLEX 20 MIN: CPT

## 2024-04-25 PROCEDURE — 97166 OT EVAL MOD COMPLEX 45 MIN: CPT

## 2024-04-25 PROCEDURE — 97116 GAIT TRAINING THERAPY: CPT

## 2024-04-25 RX ADMIN — METHOCARBAMOL 500 MG: 500 TABLET ORAL at 08:04

## 2024-04-25 RX ADMIN — ACETAMINOPHEN 650 MG: 325 TABLET ORAL at 05:04

## 2024-04-25 RX ADMIN — ESCITALOPRAM OXALATE 10 MG: 10 TABLET ORAL at 08:04
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
## 2024-04-25 NOTE — PLAN OF CARE
[facility] - Neuro Critical Care  Discharge Final Note    Primary Care Provider: No, Primary Doctor    Expected Discharge Date: 4/25/2024    Per MD, patient left AMA    Final Discharge Note (most recent)       Final Note - 04/25/24 1605          Final Note    Assessment Type Final Discharge Note     Anticipated Discharge Disposition Left Against Medical Advice                     Discharge Plan A and Plan B have been determined by review of patient's clinical status, future medical and therapeutic needs, and coverage/benefits for post-acute care in coordination with multidisciplinary team members.      Dalia Davies RN, CCRN-K, [facility]  Neuro-Critical Care   [phone]

## 2024-04-25 NOTE — PT/OT/SLP EVAL
"Physical Therapy Co-Evaluation and Co-Treatment and Discharge Note    Patient Name:  Jun Mendoza   MRN:  [id number]    Recommendations:     Discharge Recommendations:  No Therapy Indicated  Discharge Equipment Recommendations: none   Barriers to discharge: None    Assessment:     Jun Mendoza is a 28 y.o. male admitted with a medical diagnosis of Hemorrhage into subarachnoid space of spine. Patient is able to complete all visualized functional mobility with independence. They are functioning at their baseline and no longer require acute care physical therapy.    Plan:     During this hospitalization, patient does not require further acute PT services. Please re-consult if situation changes.      Subjective     Chief Complaint: None verbalized  Patient/Family Comments/goals: "I'm trying to leave today." "Can you get me a cane?"  Pain/Comfort:  Pain Rating 1: 0/10    Patients cultural, spiritual, Bahai conflicts given the current situation: no    Living Environment:  Living Environment: Patient lives with their grandmother  in a single story house with number of outside stair(s): 0 . Patient is a  and travels often.  Prior Level of Function: Prior to admission, patient was independent and driving and working.  Equipment Used at Home: none.  DME owned (not currently used): none  Assistance Upon Discharge: none (not needed)    Objective:     Communicated with RN prior to session.  Patient found HOB elevated with blood pressure cuff, pulse ox (continuous), telemetry, bed alarm upon PT entry to room.    General Precautions: Standard, fall   Orthopedic Precautions:N/A   Braces: N/A    Exams:  Cognitive Exam:  Patient is oriented to Person, Place, Time, Situation  RLE ROM: WFL  RLE Strength: WFL  LLE ROM: WFL  LLE Strength: WFL  Sensation: Intact light touch to BLEs    Functional Mobility:  Bed Mobility:     Supine to Sit: independence  Sit to Supine: independence  Transfers:     Sit to Stand: independence " with no AD  Gait: Patient ambulated 500' with no AD and independence.   Patient demonstrates steady gait, decreased step length, flexed posture, decreased altaf, and decreased arm swing.   Patient required cues for upright posture, sequencing, obstacle navigation, increased step size, and increased foot clearance.  All lines remained intact throughout ambulation trial, gait belt utilized, portable monitor utilized    Therapeutic Activities and Exercises:  Patient educated on role of acute care PT and PT POC, safety while in hospital including calling nurse for mobility, and call light usage  Pt educated on the effects of bed rest and the importance of OOB activity. Pt encouraged to sit UIC majority of day as tolerated and continue daily ambulation with nursing assist. Pt verbalized understanding.  Educated about gradual progression of activity once out of hospital  Educated about safety with functional mobility  Answered all questions within PT scope of practice to patient's satisfaction    AM-PAC 6 CLICK MOBILITY  Total Score:24     Patient left HOB elevated with all lines intact, call button in reach, RN notified, and bed alarm on.    GOALS:   Multidisciplinary Problems       Physical Therapy Goals       Not on file              Multidisciplinary Problems (Resolved)          Problem: Physical Therapy    Goal Priority Disciplines Outcome Goal Variances Interventions   Physical Therapy Goal   (Resolved)     PT, PT/OT Met     Description: Goals to be met by: 2024     Patient will increase functional independence with mobility by performin. Gait  x 500 feet with [place] using no AD. - met                           History:     Past Medical History:   Diagnosis Date    Alcohol abuse     Chlamydia        Past Surgical History:   Procedure Laterality Date    axillary gland         Time Tracking:     PT Received On: 24  PT Start Time: 1018     PT Stop Time: 1038  PT Total Time (min): 20 min      Billable Minutes: Evaluation 10 Gait Training 10      04/25/2024    Co-evaluation and co-treatment performed for this visit due to suspected patient need for two skilled therapists to ensure patient and staff safety and to accommodate for patient activity tolerance/pain management

## 2024-04-25 NOTE — NURSING
Patient requesting to leave AMA. SIMI Ta notified and at bedside. Patient education on risks. AMA form signed.

## 2024-04-25 NOTE — PLAN OF CARE
Problem: SLP  Goal: SLP Goal  Outcome: Met  Bedside Swallow Study and [facility] Evaluation completed. Recommend: regular diet, thin liquids, standard aspiration precautions. No further skilled ST services warranted at this time. Please re-consult as needed.
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
## 2024-04-25 NOTE — PLAN OF CARE
"[facility] Care Plan    POC reviewed with Jun Mendoza and family at 1400. Pt verbalized understanding. Questions and concerns addressed. No acute events today. Pt progressing toward goals. Will continue to monitor. See below and flowsheets for full assessment and VS info.     AAOx4  PRN hydralazine x2  PRN labetalol x1  IR angio done- right groin site  MRI done  VSS  NADN          Is this a stroke patient? no    Neuro:  [place] Coma Scale  Best Eye Response: 4-->(E4) spontaneous  Best Motor Response: 6-->(M6) obeys commands  Best Verbal Response: 5-->(V5) oriented  [place] Coma Scale Score: 15  Pupil PERRLA: yes     24 hr Temp:  [95.5 °F (35.3 °C)-99.7 °F (37.6 °C)]     CV:   Rhythm: normal sinus rhythm  BP goals:   SBP <  120  MAP > 65    Resp: RA          Plan: N/A    GI/:     Diet/Nutrition Received: NPO  Last Bowel Movement: 04/23/24  Voiding Characteristics: voids spontaneously without difficulty    Intake/Output Summary (Last 24 hours) at 4/24/2024 1905  Last data filed at 4/24/2024 1745  Gross per 24 hour   Intake 100 ml   Output 825 ml   Net -725 ml     Unmeasured Output  Urine Occurrence: 1    Labs/Accuchecks:  Recent Labs   Lab 04/24/24  0207   WBC 4.79   RBC 4.60   HGB 13.6*   HCT 40.6         Recent Labs   Lab 04/24/24  0207      K 3.9   CO2 22*      BUN 18   CREATININE 0.9   ALKPHOS 37*   ALT 16   AST 24   BILITOT 0.6      Recent Labs   Lab 04/23/24  2352   INR 1.1   APTT 25.1    No results for input(s): "CPK", "CPKMB", "TROPONINI", "MB" in the last 168 hours.    Electrolytes: Electrolytes replaced  Accuchecks: none    Gtts:  Current Facility-Administered Medications   Medication Dose Route Frequency Last Rate Last Admin    nicardipine  0-15 mg/hr Intravenous Continuous           LDA/Wounds:  Lines/Drains/Airways       Peripheral Intravenous Line  Duration                  Peripheral IV - Single Lumen 04/23/24 2323 20 G Anterior;Left;Proximal Forearm <1 day         Peripheral IV - Single " Lumen 04/24/24 1422 20 G Distal;Posterior;Right Forearm <1 day                  Wounds: No  Wound care consulted: No
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
## 2024-04-25 NOTE — HOSPITAL COURSE
4/25: NAEON. Exam stable, DF/EHL improving. AFVSS. DSA without intrinsic spinal cord pathology. MRI pan spine redemonstrating lumbar spine SDH, T3-T5 flow voids. Stable for TTF

## 2024-04-25 NOTE — PLAN OF CARE
"[facility] Care Plan    POC reviewed with Jun Mendoza and family at 0300. Pt verbalized understanding. Questions and concerns addressed. No acute events overnight. Pt progressing toward goals. Will continue to monitor. See below and flowsheets for full assessment and VS info.     NAEON. VSS with SBP goal <140. Neuro exam improved.  Improved strength in BLE.   Ambulates with SBA   500 ml NS bolus and mIVF       Is this a stroke patient? no    Neuro:  [place] Coma Scale  Best Eye Response: 4-->(E4) spontaneous  Best Motor Response: 6-->(M6) obeys commands  Best Verbal Response: 5-->(V5) oriented  [place] Coma Scale Score: 15  Pupil PERRLA: yes     24hr Temp:  [95.5 °F (35.3 °C)-99.7 °F (37.6 °C)]     CV:   Rhythm: sinus tachycardia  BP goals:   SBP < 140  MAP > 65    Resp:           Plan: N/A    GI/:     Diet/Nutrition Received: regular  Last Bowel Movement: 04/23/24  Voiding Characteristics: voids spontaneously without difficulty    Intake/Output Summary (Last 24 hours) at 4/25/2024 0632  Last data filed at 4/25/2024 0630  Gross per 24 hour   Intake 1260.82 ml   Output 1450 ml   Net -189.18 ml     Unmeasured Output  Urine Occurrence: 1    Labs/Accuchecks:  Recent Labs   Lab 04/25/24  0124   WBC 6.81   RBC 4.51*   HGB 13.5*   HCT 39.6*         Recent Labs   Lab 04/25/24  0124     137   K 4.3  4.3   CO2 20*  20*     107   BUN 14  14   CREATININE 0.8  0.8   ALKPHOS 41*  41*   ALT 14  14   AST 20  20   BILITOT 0.6  0.6      Recent Labs   Lab 04/23/24  2352   INR 1.1   APTT 25.1    No results for input(s): "CPK", "CPKMB", "TROPONINI", "MB" in the last 168 hours.    Electrolytes: N/A - electrolytes WDL  Accuchecks: none    Gtts:  Current Facility-Administered Medications   Medication Dose Route Frequency Last Rate Last Admin    sodium chloride 0.9%   Intravenous Continuous 60 mL/hr at 04/25/24 0558 Rate Verify at 04/25/24 0558       LDA/Wounds:    Nurses Note -- 4 Eyes    Is there altered skin " present? no       Prevention Measures Documented    Second RN/Staff Member:      Restraints: n/a       WCTM       Problem: Violence Risk or Actual  Goal: Anger and Impulse Control  Outcome: Progressing     Problem: Adult Inpatient Plan of Care  Goal: Plan of Care Review  Outcome: Progressing  Goal: Patient-Specific Goal (Individualized)  Outcome: Progressing  Goal: Absence of Hospital-Acquired Illness or Injury  Outcome: Progressing  Goal: Optimal Comfort and Wellbeing  Outcome: Progressing  Goal: Readiness for Transition of Care  Outcome: Progressing     Problem: Stroke, Intracerebral Hemorrhage  Goal: Optimal Coping  Outcome: Progressing  Goal: Effective Bowel Elimination  Outcome: Progressing  Goal: Optimal Cerebral Tissue Perfusion  Outcome: Progressing  Goal: Optimal Cognitive Function  Outcome: Progressing  Goal: Effective Communication Skills  Outcome: Progressing  Goal: Optimal Functional Ability  Outcome: Progressing  Goal: Optimal Nutrition Intake  Outcome: Progressing  Goal: Optimal Pain Control and Function  Outcome: Progressing  Goal: Effective Oxygenation and Ventilation  Outcome: Progressing  Goal: Improved Sensorimotor Function  Outcome: Progressing  Goal: Safe and Effective Swallow  Outcome: Progressing  Goal: Effective Urinary Elimination  Outcome: Progressing     Problem: Skin Injury Risk Increased  Goal: Skin Health and Integrity  Outcome: Progressing     Problem: Wound  Goal: Optimal Coping  Outcome: Progressing  Goal: Optimal Functional Ability  Outcome: Progressing  Goal: Absence of Infection Signs and Symptoms  Outcome: Progressing  Goal: Improved Oral Intake  Outcome: Progressing  Goal: Optimal Pain Control and Function  Outcome: Progressing  Goal: Skin Health and Integrity  Outcome: Progressing  Goal: Optimal Wound Healing  Outcome: Progressing

## 2024-04-25 NOTE — SUBJECTIVE & OBJECTIVE
4/25: NAEON. Exam stable, DF/EHL improving. AFVSS. DSA without intrinsic spinal cord pathology. MRI pan spine redemonstrating lumbar spine SDH, T3-T5 flow voids. Stable for TTF    Medications Prior to Admission   Medication Sig Dispense Refill Last Dose    megestroL (MEGACE) 400 mg/10 mL (40 mg/mL) Susp Take 400 mg by mouth once daily.       doxycycline (VIBRA-TABS) 100 MG tablet Take 1 tablet (100 mg total) by mouth every 12 (twelve) hours. for 4 days       EScitalopram oxalate (LEXAPRO) 10 MG tablet Take 1 tablet (10 mg total) by mouth once daily.       levETIRAcetam (KEPPRA) 500 MG Tab Take 1 tablet (500 mg total) by mouth 2 (two) times daily. for 2 days 4 tablet 0     OLANZapine (ZYPREXA) injection Inject 10 mg into the muscle every 8 (eight) hours as needed for Agitation.          Review of patient's allergies indicates:   Allergen Reactions    Penicillins Other (See Comments)     Tolerated rocephin  unknown       Past Medical History:   Diagnosis Date    Alcohol abuse     Chlamydia      Past Surgical History:   Procedure Laterality Date    axillary gland       Family History       Problem Relation (Age of Onset)    Cancer Maternal Grandmother    Colon cancer Father          Tobacco Use    Smoking status: Never    Smokeless tobacco: Never   Substance and Sexual Activity    Alcohol use: Not Currently     Comment: socially    Drug use: Never    Sexual activity: Yes     Partners: Female     Birth control/protection: None     Review of Systems   Constitutional:  Negative for chills and fever.   HENT:  Negative for rhinorrhea and sore throat.    Eyes:  Negative for pain.   Respiratory:  Negative for cough and shortness of breath.    Cardiovascular:  Negative for chest pain and palpitations.   Gastrointestinal:  Negative for abdominal pain, constipation, nausea and vomiting.   Genitourinary:  Negative for dysuria, frequency and hematuria.   Musculoskeletal:  Negative for back pain and neck pain.   Skin:  Negative for  pallor and rash.   Neurological:  Negative for seizures, facial asymmetry, weakness, numbness and headaches.     Objective:     Weight: 60.3 kg (132 lb 15 oz)  Body mass index is 20.82 kg/m².  Vital Signs (Most Recent):  Temp: 98.6 °F (37 °C) (04/25/24 1101)  Pulse: 85 (04/25/24 1101)  Resp: 20 (04/25/24 1101)  BP: 127/81 (04/25/24 1101)  SpO2: 100 % (04/25/24 1101) Vital Signs (24h Range):  Temp:  [95.5 °F (35.3 °C)-99.7 °F (37.6 °C)] 98.6 °F (37 °C)  Pulse:  [] 85  Resp:  [11-31] 20  SpO2:  [96 %-100 %] 100 %  BP: (103-136)/(56-83) 127/81     Date 04/25/24 0700 - 04/26/24 0659   Shift 1492-5082 9893-3227 4917-2093 24 Hour Total   INTAKE   I.V.(mL/kg) 206(3.4)   206(3.4)   Shift Total(mL/kg) 206(3.4)   206(3.4)   OUTPUT   Shift Total(mL/kg)       Weight (kg) 60.3 60.3 60.3 60.3                               Physical Exam         Neurosurgery Physical Exam    Neurosurgery Physical Exam    General: well developed, well nourished, no distress.   HEENT: normocephalic, atraumatic  CV: regular rate   Pulmonary: normal respirations, no signs of respiratory distress  Abdomen: soft, non-distended, not tender to palpation  Skin: Skin is warm, dry and intact  Heme: no bruising    Neuro:   Mental Status: AO x4  CN II-XII intact  Sensory: intact to light touch throughout  Motor:    Strength   Deltoids Biceps Triceps Wrist Extension Wrist Flexion    Upper: R 5/5 5/5 5/5 5/5 5/5 5/5     L 5/5 5/5 5/5 5/5 5/5 5/5       Hip Flex Knee Ext Knee Flex DF PF EHL   Lower: R 5/5 5/5  5/5 5/5 5/5     L 5/5 5/5  5/5 5/5 5/5   Reflexes: -Stewart's, -Babinski, no clonus. Patellar: 2+ bilaterally   Gait: deferred  Back: midline TTP lumbar        Significant Labs:  Recent Labs   Lab 04/24/24 0207 04/25/24  0124   * 99  99    137  137   K 3.9 4.3  4.3    107  107   CO2 22* 20*  20*   BUN 18 14  14   CREATININE 0.9 0.8  0.8   CALCIUM 9.6 9.3  9.3   MG 2.1 1.9     Recent Labs   Lab 04/24/24 0207  04/25/24  0124   WBC 4.79 6.81   HGB 13.6* 13.5*   HCT 40.6 39.6*    274     Recent Labs   Lab 04/23/24  2352   INR 1.1   APTT 25.1     Microbiology Results (last 7 days)       ** No results found for the last 168 hours. **          All pertinent labs from the last 24 hours have been reviewed.    Significant Diagnostics:  CT: No results found in the last 24 hours.  MRI: No results found in the last 24 hours.  MRI Spine Cervical-Thoracic-Lumbar W W/O Contrast (XPD)    Result Date: 4/24/2024  Subacute extra-axial hemorrhage extending from L3-S2 resulting in stable mass effect upon the thecal sac.  No underlying lesion. Few prominent vessels within the dorsal epidural space in the low to midthoracic spine.  No clear evidence of an underlying vascular nidus. Additional findings as above. Electronically signed by resident: Blanco Freedman Date:    04/24/2024 Time:    17:17 Electronically signed by: Everette Alex MD Date:    04/24/2024 Time:    18:32

## 2024-04-25 NOTE — PT/OT/SLP EVAL
Occupational Therapy   Evaluation and Discharge Note    Name: Jun Mendoza  MRN: [id number]  Admitting Diagnosis: Hemorrhage into subarachnoid space of spine  Recent Surgery: * No surgery found *      Recommendations:     Discharge Recommendations: No Therapy Indicated  Discharge Equipment Recommendations: none  Barriers to discharge:       Assessment:     Jun Mendoza is a 28 y.o. male with a medical diagnosis of Hemorrhage into subarachnoid space of spine. At this time, patient is functioning at their prior level of function and does not require further acute OT services.     Plan:     During this hospitalization, patient does not require further acute OT services.  Please re-consult if situation changes.    Plan of Care Reviewed with: patient    Subjective     Chief Complaint: Pt requesting DC   Patient/Family Comments/goals: DC     Occupational Profile:  Living Environment: Patient lives with their grandmother  in a single story house with number of outside stair(s): 0 . Tub/shower   Previous level of function: Ind   Roles and Routines:    Equipment Used at home: none  Assistance upon Discharge: Unknown     Pain/Comfort:  Pain Rating 1: 0/10    Patients cultural, spiritual, Buddhist conflicts given the current situation: no    Objective:     Communicated with: RN prior to session.  Patient found supine with   upon OT entry to room.    General Precautions: Standard, fall  Orthopedic Precautions: N/A  Braces: N/A  Respiratory Status: Room air     Occupational Performance:    Bed Mobility:    Patient completed Supine to Sit with independence  Patient completed Sit to Supine with independence    Functional Mobility/Transfers:  Patient completed Sit <> Stand Transfer with independence  with  no assistive device   Functional Mobility: Patient ambulated 500' with no AD and no assist     Activities of Daily Living:  Upper Body Dressing: independence    Lower Body Dressing: independence      Cognitive/Visual  Perceptual:  Cognitive/Psychosocial Skills:     -       Oriented to: Person, Place, Time, and Situation   -       Follows Commands/attention:Follows multistep  commands  -       Communication: clear/fluent  -       Safety awareness/insight to disability: intact   Visual/Perceptual:      -Intact      Physical Exam:  Sensation:    -       Intact  Upper Extremity Range of Motion:     -       Right Upper Extremity: WFL  -       Left Upper Extremity: WFL  Upper Extremity Strength:    -       Right Upper Extremity: WFL  -       Left Upper Extremity: WFL   Strength:    -       Right Upper Extremity: WFL  -       Left Upper Extremity: WFL  Fine Motor Coordination:    -       Intact    AMPAC 6 Click ADL:  AMPAC Total Score:      Treatment & Education:  Co-evaluation completed due to patient medical instability and to ensure patient safety. Provided education regarding role of OT, POC, & discharge recommendations.  Pt with no further questions/concerns at this time. OT provided education on home recommendations and fall prevention in preparation for D/C.     Patient left supine with all lines intact and call button in reach    GOALS:   Multidisciplinary Problems       Occupational Therapy Goals       Not on file                    History:     Past Medical History:   Diagnosis Date    Alcohol abuse     Chlamydia          Past Surgical History:   Procedure Laterality Date    axillary gland         Time Tracking:     OT Date of Treatment: 04/25/24  OT Start Time: 1018  OT Stop Time: 1037  OT Total Time (min): 19 min    Billable Minutes:Evaluation 9  Therapeutic Activity 10    4/25/2024

## 2024-04-25 NOTE — PLAN OF CARE
PT evaluation complete. Goals established and met. Pt is baseline with mobility and has no acute PT needs at this time. D/C from PT services.    Problem: Physical Therapy  Goal: Physical Therapy Goal  Description: Goals to be met by: 2024     Patient will increase functional independence with mobility by performin. Gait  x 500 feet with Cook using no AD. - met      Outcome: Met     2024

## 2024-04-26 LAB
CLINICAL BIOCHEMIST REVIEW: NORMAL
PLPETH BLD-MCNC: 50 NG/ML
POPETH BLD-MCNC: 80 NG/ML

## 2024-04-27 ENCOUNTER — HOSPITAL ENCOUNTER (OUTPATIENT)
Facility: HOSPITAL | Age: 29
Discharge: HOME | End: 2024-04-28
Attending: STUDENT IN AN ORGANIZED HEALTH CARE EDUCATION/TRAINING PROGRAM | Admitting: SURGERY
Payer: MEDICAID

## 2024-04-27 DIAGNOSIS — Z91.199 MEDICALLY NONCOMPLIANT: ICD-10-CM

## 2024-04-27 DIAGNOSIS — G95.19: Primary | ICD-10-CM

## 2024-04-28 VITALS
TEMPERATURE: 97 F | HEART RATE: 68 BPM | HEIGHT: 67 IN | WEIGHT: 131 LBS | SYSTOLIC BLOOD PRESSURE: 132 MMHG | RESPIRATION RATE: 17 BRPM | OXYGEN SATURATION: 99 % | DIASTOLIC BLOOD PRESSURE: 86 MMHG | BODY MASS INDEX: 20.56 KG/M2

## 2024-04-28 PROBLEM — R53.1 WEAKNESS: Status: ACTIVE | Noted: 2024-04-28

## 2024-04-28 LAB
ABORH RETYPE: NORMAL
ALBUMIN SERPL-MCNC: 4.1 G/DL (ref 3.5–5)
ALBUMIN/GLOB SERPL: 1.4 RATIO (ref 1.1–2)
ALP SERPL-CCNC: 36 UNIT/L (ref 40–150)
ALT SERPL-CCNC: 12 UNIT/L (ref 0–55)
AST SERPL-CCNC: 15 UNIT/L (ref 5–34)
BASOPHILS # BLD AUTO: 0.02 X10(3)/MCL
BASOPHILS NFR BLD AUTO: 0.4 %
BILIRUB SERPL-MCNC: 0.4 MG/DL
BUN SERPL-MCNC: 12.2 MG/DL (ref 8.9–20.6)
CALCIUM SERPL-MCNC: 9.3 MG/DL (ref 8.4–10.2)
CHLORIDE SERPL-SCNC: 106 MMOL/L (ref 98–107)
CO2 SERPL-SCNC: 24 MMOL/L (ref 22–29)
CREAT SERPL-MCNC: 0.85 MG/DL (ref 0.73–1.18)
EOSINOPHIL # BLD AUTO: 0.11 X10(3)/MCL (ref 0–0.9)
EOSINOPHIL NFR BLD AUTO: 2.1 %
ERYTHROCYTE [DISTWIDTH] IN BLOOD BY AUTOMATED COUNT: 11.5 % (ref 11.5–17)
GFR SERPLBLD CREATININE-BSD FMLA CKD-EPI: >60 MLS/MIN/1.73/M2
GLOBULIN SER-MCNC: 2.9 GM/DL (ref 2.4–3.5)
GLUCOSE SERPL-MCNC: 95 MG/DL (ref 74–100)
GROUP & RH: NORMAL
HCT VFR BLD AUTO: 37.1 % (ref 42–52)
HGB BLD-MCNC: 12.7 G/DL (ref 14–18)
IMM GRANULOCYTES # BLD AUTO: 0.01 X10(3)/MCL (ref 0–0.04)
IMM GRANULOCYTES NFR BLD AUTO: 0.2 %
INDIRECT COOMBS: NORMAL
INR PPP: 1.1
LYMPHOCYTES # BLD AUTO: 2.22 X10(3)/MCL (ref 0.6–4.6)
LYMPHOCYTES NFR BLD AUTO: 41.6 %
MCH RBC QN AUTO: 29.7 PG (ref 27–31)
MCHC RBC AUTO-ENTMCNC: 34.2 G/DL (ref 33–36)
MCV RBC AUTO: 86.7 FL (ref 80–94)
MONOCYTES # BLD AUTO: 0.49 X10(3)/MCL (ref 0.1–1.3)
MONOCYTES NFR BLD AUTO: 9.2 %
NEUTROPHILS # BLD AUTO: 2.49 X10(3)/MCL (ref 2.1–9.2)
NEUTROPHILS NFR BLD AUTO: 46.5 %
NRBC BLD AUTO-RTO: 0 %
PLATELET # BLD AUTO: 315 X10(3)/MCL (ref 130–400)
PMV BLD AUTO: 8.5 FL (ref 7.4–10.4)
POTASSIUM SERPL-SCNC: 3.5 MMOL/L (ref 3.5–5.1)
PROT SERPL-MCNC: 7 GM/DL (ref 6.4–8.3)
PROTHROMBIN TIME: 14.5 SECONDS (ref 12.5–14.5)
RBC # BLD AUTO: 4.28 X10(6)/MCL (ref 4.7–6.1)
SODIUM SERPL-SCNC: 138 MMOL/L (ref 136–145)
SPECIMEN OUTDATE: NORMAL
WBC # SPEC AUTO: 5.34 X10(3)/MCL (ref 4.5–11.5)

## 2024-04-28 PROCEDURE — 96360 HYDRATION IV INFUSION INIT: CPT

## 2024-04-28 PROCEDURE — G0378 HOSPITAL OBSERVATION PER HR: HCPCS

## 2024-04-28 PROCEDURE — 99499 UNLISTED E&M SERVICE: CPT | Mod: ,,, | Performed by: NEUROLOGICAL SURGERY

## 2024-04-28 PROCEDURE — 85610 PROTHROMBIN TIME: CPT | Performed by: EMERGENCY MEDICINE

## 2024-04-28 PROCEDURE — 25000003 PHARM REV CODE 250: Performed by: EMERGENCY MEDICINE

## 2024-04-28 PROCEDURE — 85025 COMPLETE CBC W/AUTO DIFF WBC: CPT | Performed by: EMERGENCY MEDICINE

## 2024-04-28 PROCEDURE — 96361 HYDRATE IV INFUSION ADD-ON: CPT

## 2024-04-28 PROCEDURE — 99223 1ST HOSP IP/OBS HIGH 75: CPT | Mod: ,,, | Performed by: NEUROLOGICAL SURGERY

## 2024-04-28 PROCEDURE — 99285 EMERGENCY DEPT VISIT HI MDM: CPT | Mod: 25

## 2024-04-28 PROCEDURE — 63600175 PHARM REV CODE 636 W HCPCS

## 2024-04-28 PROCEDURE — 86923 COMPATIBILITY TEST ELECTRIC: CPT | Performed by: EMERGENCY MEDICINE

## 2024-04-28 PROCEDURE — 86850 RBC ANTIBODY SCREEN: CPT | Performed by: NEUROLOGICAL SURGERY

## 2024-04-28 PROCEDURE — 11000001 HC ACUTE MED/SURG PRIVATE ROOM

## 2024-04-28 PROCEDURE — 25000003 PHARM REV CODE 250

## 2024-04-28 PROCEDURE — 80053 COMPREHEN METABOLIC PANEL: CPT | Performed by: EMERGENCY MEDICINE

## 2024-04-28 RX ORDER — ADHESIVE BANDAGE
30 BANDAGE TOPICAL DAILY PRN
Status: DISCONTINUED | OUTPATIENT
Start: 2024-04-28 | End: 2024-04-28 | Stop reason: HOSPADM

## 2024-04-28 RX ORDER — SODIUM CHLORIDE, SODIUM LACTATE, POTASSIUM CHLORIDE, CALCIUM CHLORIDE 600; 310; 30; 20 MG/100ML; MG/100ML; MG/100ML; MG/100ML
INJECTION, SOLUTION INTRAVENOUS CONTINUOUS
Status: DISCONTINUED | OUTPATIENT
Start: 2024-04-28 | End: 2024-04-28

## 2024-04-28 RX ORDER — DOCUSATE SODIUM 100 MG/1
100 CAPSULE, LIQUID FILLED ORAL 2 TIMES DAILY
Status: DISCONTINUED | OUTPATIENT
Start: 2024-04-28 | End: 2024-04-28 | Stop reason: HOSPADM

## 2024-04-28 RX ORDER — LORAZEPAM 1 MG/1
1 TABLET ORAL EVERY 4 HOURS PRN
Status: DISCONTINUED | OUTPATIENT
Start: 2024-04-28 | End: 2024-04-28 | Stop reason: HOSPADM

## 2024-04-28 RX ORDER — TALC
6 POWDER (GRAM) TOPICAL NIGHTLY PRN
Status: DISCONTINUED | OUTPATIENT
Start: 2024-04-28 | End: 2024-04-28 | Stop reason: HOSPADM

## 2024-04-28 RX ORDER — FOLIC ACID 1 MG/1
1 TABLET ORAL DAILY
Status: DISCONTINUED | OUTPATIENT
Start: 2024-04-28 | End: 2024-04-28 | Stop reason: HOSPADM

## 2024-04-28 RX ORDER — HYDROCODONE BITARTRATE AND ACETAMINOPHEN 500; 5 MG/1; MG/1
TABLET ORAL
Status: DISCONTINUED | OUTPATIENT
Start: 2024-04-28 | End: 2024-04-28 | Stop reason: HOSPADM

## 2024-04-28 RX ORDER — ACETAMINOPHEN 325 MG/1
650 TABLET ORAL EVERY 4 HOURS
Status: DISCONTINUED | OUTPATIENT
Start: 2024-04-28 | End: 2024-04-28 | Stop reason: HOSPADM

## 2024-04-28 RX ORDER — OXYCODONE HYDROCHLORIDE 10 MG/1
10 TABLET ORAL EVERY 4 HOURS PRN
Status: DISCONTINUED | OUTPATIENT
Start: 2024-04-28 | End: 2024-04-28 | Stop reason: HOSPADM

## 2024-04-28 RX ORDER — POLYETHYLENE GLYCOL 3350 17 G/17G
17 POWDER, FOR SOLUTION ORAL 2 TIMES DAILY
Status: DISCONTINUED | OUTPATIENT
Start: 2024-04-28 | End: 2024-04-28 | Stop reason: HOSPADM

## 2024-04-28 RX ORDER — OXYCODONE HYDROCHLORIDE 5 MG/1
5 TABLET ORAL EVERY 4 HOURS PRN
Status: DISCONTINUED | OUTPATIENT
Start: 2024-04-28 | End: 2024-04-28 | Stop reason: HOSPADM

## 2024-04-28 RX ORDER — GABAPENTIN 300 MG/1
300 CAPSULE ORAL 3 TIMES DAILY
Status: DISCONTINUED | OUTPATIENT
Start: 2024-04-28 | End: 2024-04-28 | Stop reason: HOSPADM

## 2024-04-28 RX ORDER — THIAMINE HCL 100 MG
100 TABLET ORAL DAILY
Status: DISCONTINUED | OUTPATIENT
Start: 2024-04-28 | End: 2024-04-28 | Stop reason: HOSPADM

## 2024-04-28 RX ORDER — METHOCARBAMOL 500 MG/1
500 TABLET, FILM COATED ORAL EVERY 8 HOURS
Status: DISCONTINUED | OUTPATIENT
Start: 2024-04-28 | End: 2024-04-28 | Stop reason: HOSPADM

## 2024-04-28 RX ADMIN — METHOCARBAMOL 500 MG: 500 TABLET ORAL at 05:04

## 2024-04-28 RX ADMIN — ACETAMINOPHEN 650 MG: 325 TABLET, FILM COATED ORAL at 05:04

## 2024-04-28 RX ADMIN — SODIUM CHLORIDE, POTASSIUM CHLORIDE, SODIUM LACTATE AND CALCIUM CHLORIDE: 600; 310; 30; 20 INJECTION, SOLUTION INTRAVENOUS at 05:04

## 2024-04-28 RX ADMIN — SODIUM CHLORIDE 1000 ML: 9 INJECTION, SOLUTION INTRAVENOUS at 04:04
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
## 2024-04-29 ENCOUNTER — TELEPHONE (OUTPATIENT)
Dept: NEUROSURGERY | Facility: CLINIC | Age: 29
End: 2024-04-29
Payer: MEDICAID

## 2024-04-29 DIAGNOSIS — G95.19: Primary | ICD-10-CM

## 2024-04-29 NOTE — TELEPHONE ENCOUNTER
The patient is D/C. I spoke with him to see if he was now home or residing in an inpatient rehab. He is now home. I scheduled his hospital F/U with Flor for 5/21/24 at 1:30. I scheduled the MRI at [facility] for 5/20/24 at 4:00. I was going to schedule him 2 days before but he is about to start a new job and knows he will have to be gone for a week. I advised him to let me know if these times and dates do not work out for him and I can try to accommodate his work schedule. He verbalized understanding and requested for me to email this information to him.

## 2024-04-30 LAB
ABO + RH BLD: NORMAL
ABO + RH BLD: NORMAL
BLD PROD TYP BPU: NORMAL
BLD PROD TYP BPU: NORMAL
BLOOD UNIT EXPIRATION DATE: NORMAL
BLOOD UNIT EXPIRATION DATE: NORMAL
BLOOD UNIT TYPE CODE: 1700
BLOOD UNIT TYPE CODE: 1700
CROSSMATCH INTERPRETATION: NORMAL
CROSSMATCH INTERPRETATION: NORMAL
DISPENSE STATUS: NORMAL
DISPENSE STATUS: NORMAL
UNIT NUMBER: NORMAL
UNIT NUMBER: NORMAL
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
## 2024-05-16 ENCOUNTER — TELEPHONE (OUTPATIENT)
Dept: NEUROSURGERY | Facility: CLINIC | Age: 29
End: 2024-05-16
Payer: MEDICAID

## 2024-05-16 NOTE — TELEPHONE ENCOUNTER
I reached out to preservice to see if a P2P can be done; if not then I will advise on if patient can just come in to be seen and then have the MRI ordered.

## 2024-05-17 NOTE — TELEPHONE ENCOUNTER
Flor messaged me back stating that she wouldn't be able to do the P2P Monday b/c of clinic and she's already scheduled to do another P2P. She advised to have patient come in w/o MRI, she will document the need for the MRI, and we can resubmit.

## 2024-05-17 NOTE — TELEPHONE ENCOUNTER
Noble stated that a P2P could be tried, but due to patient having had an MRI less than a month ago it may not get authorized by his appt date. When I received this information Flor was out of the clinic and not here to do a P2P nor to advise on if patient could just come to his appt and have the scan afterwards. I will see if a P2P can be done on Monday in hopes that if it is auth, the patient can get in to have it done before Tuesday; if not then I'm still going to leave him on the schedule due to his dx. I sent a text to Flor with this info and will see if she can let me know while she's out.

## 2024-05-24 PROBLEM — F10.20 ALCOHOL USE DISORDER, SEVERE, DEPENDENCE: Status: ACTIVE | Noted: 2024-04-23

## 2024-05-24 PROBLEM — F19.94 SUBSTANCE INDUCED MOOD DISORDER: Status: ACTIVE | Noted: 2024-05-24
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
## 2024-06-19 ENCOUNTER — HOSPITAL ENCOUNTER (EMERGENCY)
Facility: HOSPITAL | Age: 29
Discharge: HOME | End: 2024-06-20
Attending: EMERGENCY MEDICINE
Payer: MEDICAID

## 2024-06-19 DIAGNOSIS — G62.9 NEUROPATHY: ICD-10-CM

## 2024-06-19 DIAGNOSIS — M54.50 ACUTE MIDLINE LOW BACK PAIN WITHOUT SCIATICA: Primary | ICD-10-CM

## 2024-06-19 DIAGNOSIS — R29.898 BILATERAL LEG WEAKNESS: ICD-10-CM

## 2024-06-19 PROCEDURE — 99285 EMERGENCY DEPT VISIT HI MDM: CPT | Mod: 25

## 2024-06-20 ENCOUNTER — OFFICE VISIT (OUTPATIENT)
Dept: NEUROSURGERY | Facility: CLINIC | Age: 29
End: 2024-06-20
Payer: MEDICAID

## 2024-06-20 VITALS
SYSTOLIC BLOOD PRESSURE: 159 MMHG | HEIGHT: 67 IN | DIASTOLIC BLOOD PRESSURE: 92 MMHG | HEART RATE: 68 BPM | RESPIRATION RATE: 18 BRPM | TEMPERATURE: 99 F | OXYGEN SATURATION: 98 % | WEIGHT: 140 LBS | BODY MASS INDEX: 21.97 KG/M2

## 2024-06-20 VITALS
HEIGHT: 67 IN | BODY MASS INDEX: 20.88 KG/M2 | HEART RATE: 91 BPM | WEIGHT: 133 LBS | RESPIRATION RATE: 16 BRPM | DIASTOLIC BLOOD PRESSURE: 64 MMHG | SYSTOLIC BLOOD PRESSURE: 117 MMHG

## 2024-06-20 DIAGNOSIS — M54.16 LUMBAR RADICULOPATHY: ICD-10-CM

## 2024-06-20 DIAGNOSIS — R56.1 SEIZURE AFTER HEAD INJURY: ICD-10-CM

## 2024-06-20 DIAGNOSIS — S06.4XAA EPIDURAL HEMATOMA: Primary | ICD-10-CM

## 2024-06-20 PROCEDURE — 3008F BODY MASS INDEX DOCD: CPT | Mod: CPTII,,, | Performed by: NURSE PRACTITIONER

## 2024-06-20 PROCEDURE — 3044F HG A1C LEVEL LT 7.0%: CPT | Mod: CPTII,,, | Performed by: NURSE PRACTITIONER

## 2024-06-20 PROCEDURE — 1159F MED LIST DOCD IN RCRD: CPT | Mod: CPTII,,, | Performed by: NURSE PRACTITIONER

## 2024-06-20 PROCEDURE — 3078F DIAST BP <80 MM HG: CPT | Mod: CPTII,,, | Performed by: NURSE PRACTITIONER

## 2024-06-20 PROCEDURE — 3074F SYST BP LT 130 MM HG: CPT | Mod: CPTII,,, | Performed by: NURSE PRACTITIONER

## 2024-06-20 PROCEDURE — 99215 OFFICE O/P EST HI 40 MIN: CPT | Mod: ,,, | Performed by: NURSE PRACTITIONER

## 2024-06-20 PROCEDURE — 1160F RVW MEDS BY RX/DR IN RCRD: CPT | Mod: CPTII,,, | Performed by: NURSE PRACTITIONER

## 2024-06-20 RX ORDER — MEGESTROL ACETATE 40 MG/ML
SUSPENSION ORAL
COMMUNITY
Start: 2024-03-12 | End: 2024-06-20

## 2024-06-20 RX ORDER — GABAPENTIN 300 MG/1
300 CAPSULE ORAL 3 TIMES DAILY
Qty: 30 CAPSULE | Refills: 0 | Status: SHIPPED | OUTPATIENT
Start: 2024-06-20 | End: 2024-06-30

## 2024-06-20 RX ORDER — HYDROCODONE BITARTRATE AND ACETAMINOPHEN 5; 325 MG/1; MG/1
1 TABLET ORAL EVERY 6 HOURS PRN
Qty: 6 TABLET | Refills: 0 | Status: SHIPPED | OUTPATIENT
Start: 2024-06-20 | End: 2024-06-22

## 2024-06-20 RX ORDER — ESCITALOPRAM OXALATE 10 MG/1
TABLET ORAL
COMMUNITY
Start: 2024-06-19 | End: 2024-06-20
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
## 2024-06-20 NOTE — FIRST PROVIDER EVALUATION
"Medical screening examination initiated.  I have conducted a focused provider triage encounter, findings are as follows:    Brief history of present illness:  28 year old male presents to the ER for evaluation of LEONID LE numbness and weakness x 2 months, worsening today. Worsening lower back pain today. Hx of subarachnoid bleed into the spine. Denies any bladder or bowel incontinence. Denies any new injury. Has follow up with Dr. Barragan tomorrow.     Vitals:    06/19/24 2053   BP: (!) 150/109   Pulse: 80   Resp: 17   Temp: 98.5 °F (36.9 °C)   SpO2: 99%   Weight: 63.5 kg (140 lb)   Height: 5' 7" (1.702 m)       Pertinent physical exam:  alert, ambulatory, 4/5 motor strength in LEONID LE.    Brief workup plan:  imaging    Preliminary workup initiated; this workup will be continued and followed by the physician or advanced practice provider that is assigned to the patient when roomed.  "
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
## 2024-09-16 ENCOUNTER — TELEPHONE (OUTPATIENT)
Dept: NEUROSURGERY | Facility: CLINIC | Age: 29
End: 2024-09-16
Payer: MEDICAID

## 2024-09-16 NOTE — TELEPHONE ENCOUNTER
At the patients last visit with Flor on 6/20/24, she referred the patient for PT at [facility]. I tried to call the patient to see if he was ever able to being PT. He did not answer and I was unable to leave a vm.

## 2024-10-25 ENCOUNTER — PATIENT MESSAGE (OUTPATIENT)
Dept: RESEARCH | Facility: HOSPITAL | Age: 29
End: 2024-10-25
Payer: MEDICAID

## 2024-12-19 ENCOUNTER — HOSPITAL ENCOUNTER (EMERGENCY)
Facility: HOSPITAL | Age: 29
Discharge: HOME | End: 2024-12-19
Attending: STUDENT IN AN ORGANIZED HEALTH CARE EDUCATION/TRAINING PROGRAM
Payer: MEDICAID

## 2024-12-19 VITALS
DIASTOLIC BLOOD PRESSURE: 93 MMHG | RESPIRATION RATE: 18 BRPM | BODY MASS INDEX: 22.76 KG/M2 | WEIGHT: 145 LBS | HEART RATE: 82 BPM | HEIGHT: 67 IN | SYSTOLIC BLOOD PRESSURE: 148 MMHG | OXYGEN SATURATION: 100 % | TEMPERATURE: 99 F

## 2024-12-19 DIAGNOSIS — R30.0 DYSURIA: Primary | ICD-10-CM

## 2024-12-19 DIAGNOSIS — Z20.2 POSSIBLE EXPOSURE TO STD: ICD-10-CM

## 2024-12-19 LAB
BILIRUB UR QL STRIP.AUTO: NEGATIVE
C TRACH DNA SPEC QL NAA+PROBE: NOT DETECTED
CLARITY UR: CLEAR
COLOR UR AUTO: YELLOW
GLUCOSE UR QL STRIP: NEGATIVE
HGB UR QL STRIP: NEGATIVE
KETONES UR QL STRIP: NEGATIVE
LEUKOCYTE ESTERASE UR QL STRIP: NEGATIVE
N GONORRHOEA DNA SPEC QL NAA+PROBE: NOT DETECTED
NITRITE UR QL STRIP: NEGATIVE
PH UR STRIP: 6 [PH]
PROT UR QL STRIP: NEGATIVE
SOURCE (OHS): NORMAL
SP GR UR STRIP.AUTO: >=1.03 (ref 1–1.03)
UROBILINOGEN UR STRIP-ACNC: 1

## 2024-12-19 PROCEDURE — 63600175 PHARM REV CODE 636 W HCPCS

## 2024-12-19 PROCEDURE — 81003 URINALYSIS AUTO W/O SCOPE: CPT

## 2024-12-19 PROCEDURE — 87591 N.GONORRHOEAE DNA AMP PROB: CPT

## 2024-12-19 PROCEDURE — 99284 EMERGENCY DEPT VISIT MOD MDM: CPT | Mod: 25

## 2024-12-19 PROCEDURE — 96372 THER/PROPH/DIAG INJ SC/IM: CPT

## 2024-12-19 RX ORDER — DOXYCYCLINE 100 MG/1
100 CAPSULE ORAL 2 TIMES DAILY
Qty: 14 CAPSULE | Refills: 0 | Status: SHIPPED | OUTPATIENT
Start: 2024-12-19 | End: 2024-12-26

## 2024-12-19 RX ORDER — CEFTRIAXONE 1 G/1
0.5 INJECTION, POWDER, FOR SOLUTION INTRAMUSCULAR; INTRAVENOUS
Status: COMPLETED | OUTPATIENT
Start: 2024-12-19 | End: 2024-12-19

## 2024-12-19 RX ADMIN — CEFTRIAXONE SODIUM 0.5 G: 1 INJECTION, POWDER, FOR SOLUTION INTRAMUSCULAR; INTRAVENOUS at 01:12
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
## 2025-03-15 ENCOUNTER — HOSPITAL ENCOUNTER (EMERGENCY)
Facility: HOSPITAL | Age: 30
Discharge: HOME | End: 2025-03-16
Attending: STUDENT IN AN ORGANIZED HEALTH CARE EDUCATION/TRAINING PROGRAM

## 2025-03-15 DIAGNOSIS — R53.1 GENERALIZED WEAKNESS: Primary | ICD-10-CM

## 2025-03-15 LAB
ACCEPTIBLE SP GR UR QL: 1.03 (ref 1–1.03)
ALBUMIN SERPL-MCNC: 4.5 G/DL (ref 3.5–5)
ALBUMIN/GLOB SERPL: 1.4 RATIO (ref 1.1–2)
ALP SERPL-CCNC: 46 UNIT/L (ref 40–150)
ALT SERPL-CCNC: 10 UNIT/L (ref 0–55)
AMPHET UR QL SCN: NEGATIVE
ANION GAP SERPL CALC-SCNC: 10 MEQ/L
AST SERPL-CCNC: 16 UNIT/L (ref 5–34)
BACTERIA #/AREA URNS AUTO: ABNORMAL /HPF
BARBITURATE SCN PRESENT UR: NEGATIVE
BASOPHILS # BLD AUTO: 0.03 X10(3)/MCL
BASOPHILS NFR BLD AUTO: 0.5 %
BENZODIAZ UR QL SCN: NEGATIVE
BILIRUB SERPL-MCNC: 0.9 MG/DL
BILIRUB UR QL STRIP.AUTO: NEGATIVE
BUN SERPL-MCNC: 10.9 MG/DL (ref 8.9–20.6)
CALCIUM SERPL-MCNC: 9.2 MG/DL (ref 8.4–10.2)
CANNABINOIDS UR QL SCN: NEGATIVE
CHLORIDE SERPL-SCNC: 104 MMOL/L (ref 98–107)
CLARITY UR: CLEAR
CO2 SERPL-SCNC: 27 MMOL/L (ref 22–29)
COCAINE UR QL SCN: NEGATIVE
COLOR UR AUTO: ABNORMAL
CREAT SERPL-MCNC: 1.05 MG/DL (ref 0.72–1.25)
CREAT/UREA NIT SERPL: 10
EOSINOPHIL # BLD AUTO: 0.12 X10(3)/MCL (ref 0–0.9)
EOSINOPHIL NFR BLD AUTO: 1.9 %
ERYTHROCYTE [DISTWIDTH] IN BLOOD BY AUTOMATED COUNT: 12.1 % (ref 11.5–17)
FENTANYL UR QL SCN: NEGATIVE
GFR SERPLBLD CREATININE-BSD FMLA CKD-EPI: >60 ML/MIN/1.73/M2
GLOBULIN SER-MCNC: 3.2 GM/DL (ref 2.4–3.5)
GLUCOSE SERPL-MCNC: 82 MG/DL (ref 74–100)
GLUCOSE UR QL STRIP: NORMAL
HCT VFR BLD AUTO: 43.6 % (ref 42–52)
HGB BLD-MCNC: 14.7 G/DL (ref 14–18)
HGB UR QL STRIP: NEGATIVE
IMM GRANULOCYTES # BLD AUTO: 0 X10(3)/MCL (ref 0–0.04)
IMM GRANULOCYTES NFR BLD AUTO: 0 %
KETONES UR QL STRIP: NEGATIVE
LEUKOCYTE ESTERASE UR QL STRIP: NEGATIVE
LYMPHOCYTES # BLD AUTO: 2.63 X10(3)/MCL (ref 0.6–4.6)
LYMPHOCYTES NFR BLD AUTO: 42.5 %
MCH RBC QN AUTO: 29.4 PG (ref 27–31)
MCHC RBC AUTO-ENTMCNC: 33.7 G/DL (ref 33–36)
MCV RBC AUTO: 87.2 FL (ref 80–94)
MDMA UR QL SCN: NEGATIVE
MONOCYTES # BLD AUTO: 0.64 X10(3)/MCL (ref 0.1–1.3)
MONOCYTES NFR BLD AUTO: 10.3 %
NEUTROPHILS # BLD AUTO: 2.77 X10(3)/MCL (ref 2.1–9.2)
NEUTROPHILS NFR BLD AUTO: 44.8 %
NITRITE UR QL STRIP: NEGATIVE
NRBC BLD AUTO-RTO: 0 %
OPIATES UR QL SCN: NEGATIVE
PCP UR QL: NEGATIVE
PH UR STRIP: 7 [PH]
PH UR: 7 [PH] (ref 3–11)
PLATELET # BLD AUTO: 356 X10(3)/MCL (ref 130–400)
PMV BLD AUTO: 8.7 FL (ref 7.4–10.4)
POTASSIUM SERPL-SCNC: 4 MMOL/L (ref 3.5–5.1)
PROT SERPL-MCNC: 7.7 GM/DL (ref 6.4–8.3)
PROT UR QL STRIP: NEGATIVE
RBC # BLD AUTO: 5 X10(6)/MCL (ref 4.7–6.1)
RBC #/AREA URNS AUTO: ABNORMAL /HPF
SODIUM SERPL-SCNC: 141 MMOL/L (ref 136–145)
SP GR UR STRIP.AUTO: 1.03 (ref 1–1.03)
SQUAMOUS #/AREA URNS LPF: ABNORMAL /HPF
UROBILINOGEN UR STRIP-ACNC: >12
WBC # BLD AUTO: 6.19 X10(3)/MCL (ref 4.5–11.5)
WBC #/AREA URNS AUTO: ABNORMAL /HPF

## 2025-03-15 PROCEDURE — 85025 COMPLETE CBC W/AUTO DIFF WBC: CPT | Performed by: NURSE PRACTITIONER

## 2025-03-15 PROCEDURE — 81001 URINALYSIS AUTO W/SCOPE: CPT | Performed by: NURSE PRACTITIONER

## 2025-03-15 PROCEDURE — 99284 EMERGENCY DEPT VISIT MOD MDM: CPT | Mod: 25

## 2025-03-15 PROCEDURE — 80307 DRUG TEST PRSMV CHEM ANLYZR: CPT | Performed by: NURSE PRACTITIONER

## 2025-03-15 PROCEDURE — 80053 COMPREHEN METABOLIC PANEL: CPT | Performed by: NURSE PRACTITIONER

## 2025-03-16 VITALS
WEIGHT: 135 LBS | BODY MASS INDEX: 21.14 KG/M2 | DIASTOLIC BLOOD PRESSURE: 73 MMHG | TEMPERATURE: 99 F | RESPIRATION RATE: 15 BRPM | OXYGEN SATURATION: 99 % | SYSTOLIC BLOOD PRESSURE: 118 MMHG | HEART RATE: 58 BPM
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
## 2025-03-16 NOTE — ED NOTES
Patient ambulated at this time. Patient seen grasping phone in both hands without problem. Patient ambulated with a steady gait. Patient balanced on left leg to pull down pants leg on right side. Patient balanced unilaterally without assistance and without problem. Dr. Vaca notified. Will continue current plan of care at this time.

## 2025-03-16 NOTE — DISCHARGE INSTRUCTIONS
Thanks for letting us take care of you today!  It is our goal to give you courteous care and to keep you comfortable and informed, if you have any questions before you leave I will be happy to try and answer them.    Here is some advice after your visit:    Your visit in the emergency department is NOT definitive care - please follow-up with your primary care doctor and/or specialist within 1-2 days. Please return to the emergency department if you develop worsening symptoms including: fever, chills, chest pain, shortness of breath, weakness, numbness, tingling, nausea, vomiting, inability to eat, drink, or take your medication. Please return if you have any worsening in your condition or if you have any other concerns.    If you had radiology exams like an XRAY or CT in the emergency Department the interpreation on them may be preliminary - there may be less time sensitive findings on the reports please obtain these reports within 24 hours from the hospital or by using your out on your mobile phone to access records.  Bring these to your primary care doctor and/or specialist for further review of incidental findings.    Please review any LAB WORK from your visit today with your primary care physician.    Please follow up with the primary care physician.  You may consider finding follow up with a neurosurgeon as well.

## 2025-08-10 ENCOUNTER — HOSPITAL ENCOUNTER (EMERGENCY)
Facility: HOSPITAL | Age: 30
Discharge: HOME | End: 2025-08-11
Attending: EMERGENCY MEDICINE

## 2025-08-10 DIAGNOSIS — R30.0 DYSURIA: Primary | ICD-10-CM

## 2025-08-10 LAB
BACTERIA #/AREA URNS AUTO: ABNORMAL /HPF
BILIRUB UR QL STRIP.AUTO: NEGATIVE
CLARITY UR: CLEAR
COLOR UR AUTO: YELLOW
GLUCOSE UR QL STRIP: NEGATIVE
HGB UR QL STRIP: NEGATIVE
KETONES UR QL STRIP: ABNORMAL
LEUKOCYTE ESTERASE UR QL STRIP: NEGATIVE
MUCOUS THREADS URNS QL MICRO: ABNORMAL /LPF
NITRITE UR QL STRIP: NEGATIVE
PH UR STRIP: 6.5 [PH]
PROT UR QL STRIP: ABNORMAL
RBC #/AREA URNS AUTO: ABNORMAL /HPF
SP GR UR STRIP.AUTO: 1.02 (ref 1–1.03)
SQUAMOUS #/AREA URNS AUTO: ABNORMAL /HPF
UROBILINOGEN UR STRIP-ACNC: 4
WBC #/AREA URNS AUTO: ABNORMAL /HPF

## 2025-08-10 PROCEDURE — 81003 URINALYSIS AUTO W/O SCOPE: CPT | Performed by: EMERGENCY MEDICINE

## 2025-08-10 PROCEDURE — 87491 CHLMYD TRACH DNA AMP PROBE: CPT | Performed by: EMERGENCY MEDICINE

## 2025-08-10 PROCEDURE — 99284 EMERGENCY DEPT VISIT MOD MDM: CPT

## 2025-08-10 RX ORDER — CEFTRIAXONE 1 G/1
0.5 INJECTION, POWDER, FOR SOLUTION INTRAMUSCULAR; INTRAVENOUS
Status: COMPLETED | OUTPATIENT
Start: 2025-08-10 | End: 2025-08-11

## 2025-08-10 RX ORDER — CIPROFLOXACIN 500 MG/1
500 TABLET, FILM COATED ORAL 2 TIMES DAILY
Qty: 14 TABLET | Refills: 0 | Status: SHIPPED | OUTPATIENT
Start: 2025-08-10 | End: 2025-08-17

## 2025-08-10 RX ORDER — AZITHROMYCIN 250 MG/1
1000 TABLET, FILM COATED ORAL
Status: COMPLETED | OUTPATIENT
Start: 2025-08-10 | End: 2025-08-11

## 2025-08-11 VITALS
BODY MASS INDEX: 20.4 KG/M2 | WEIGHT: 130 LBS | HEIGHT: 67 IN | HEART RATE: 76 BPM | SYSTOLIC BLOOD PRESSURE: 124 MMHG | OXYGEN SATURATION: 99 % | RESPIRATION RATE: 18 BRPM | DIASTOLIC BLOOD PRESSURE: 68 MMHG | TEMPERATURE: 98 F

## 2025-08-11 LAB
C TRACH DNA SPEC QL NAA+PROBE: NOT DETECTED
N GONORRHOEA DNA SPEC QL NAA+PROBE: NOT DETECTED
SPECIMEN SOURCE: NORMAL

## 2025-08-11 PROCEDURE — 96372 THER/PROPH/DIAG INJ SC/IM: CPT | Performed by: EMERGENCY MEDICINE

## 2025-08-11 PROCEDURE — 63700000 PHARM REV CODE 250 ALT 637 W/O HCPCS: Performed by: EMERGENCY MEDICINE

## 2025-08-11 PROCEDURE — 63600175 PHARM REV CODE 636 W HCPCS: Performed by: EMERGENCY MEDICINE

## 2025-08-11 RX ADMIN — CEFTRIAXONE SODIUM 0.5 G: 1 INJECTION, POWDER, FOR SOLUTION INTRAMUSCULAR; INTRAVENOUS at 12:08

## 2025-08-11 RX ADMIN — AZITHROMYCIN DIHYDRATE 1000 MG: 250 TABLET ORAL at 12:08

## (undated) DEVICE — DRAPE C-ARMOR EQUIPMENT COVER

## (undated) DEVICE — DRESSING AQUACEL AG ADV 3.5X12

## (undated) DEVICE — DRAPE C-ARM COVER EZ 36X28IN

## (undated) DEVICE — SUT VICRYL 2 0 CT 2

## (undated) DEVICE — GLOVE PROTEXIS PI SYN SURG 6.5

## (undated) DEVICE — DISH PETRI MED 3.5IN

## (undated) DEVICE — SPONGE PATTY NEURO SGL 0.5X6

## (undated) DEVICE — ELECTRODE BLADE E-Z CLEAN 4IN

## (undated) DEVICE — GLOVE PROTEXIS PI SYN SURG 7

## (undated) DEVICE — KIT SURGIFLO HEMOSTATIC MATRIX

## (undated) DEVICE — KIT SURGICAL TURNOVER

## (undated) DEVICE — Device

## (undated) DEVICE — KIT DRAIN WOUND RND SPRNG RESV

## (undated) DEVICE — SPONGE SURGIFOAM 100 8.5X12X10

## (undated) DEVICE — BUR BONE CUT MICRO TPS 3X3.8MM

## (undated) DEVICE — DRAPE ORTH SPLIT 77X108IN

## (undated) DEVICE — ELECTRODE PATIENT RETURN DISP

## (undated) DEVICE — BLADE EZ CLEAN 2 1/2

## (undated) DEVICE — SPONGE COTTON TRAY 4X4IN

## (undated) DEVICE — STAPLER SKIN PROXIMATE WIDE

## (undated) DEVICE — SHEET DRAPE TOP BAR - EMERALD

## (undated) DEVICE — SPONGE LAP 18X18 PREWASHED

## (undated) DEVICE — GOWN X-LG STERILE BACK

## (undated) DEVICE — SUT VICRYL PLUS 0 CT-1 18IN

## (undated) DEVICE — PILLOW HEAD REST

## (undated) DEVICE — TRAY SKIN SCRUB WET PREMIUM

## (undated) DEVICE — PAD DERMAPROX XL 22X14X.5IN

## (undated) DEVICE — SPONGE GAUZE 16PLY 4X4

## (undated) DEVICE — TUBE SUCTION MEDI-VAC STERILE

## (undated) DEVICE — TRAY CATH FOL SIL URIMTR 16FR

## (undated) DEVICE — TAPE CLOTH SOFT MEDIPORE 4IN